# Patient Record
Sex: MALE | Race: WHITE | NOT HISPANIC OR LATINO
[De-identification: names, ages, dates, MRNs, and addresses within clinical notes are randomized per-mention and may not be internally consistent; named-entity substitution may affect disease eponyms.]

---

## 2017-01-11 ENCOUNTER — APPOINTMENT (OUTPATIENT)
Dept: GASTROENTEROLOGY | Facility: CLINIC | Age: 62
End: 2017-01-11

## 2017-01-11 VITALS
SYSTOLIC BLOOD PRESSURE: 120 MMHG | DIASTOLIC BLOOD PRESSURE: 70 MMHG | WEIGHT: 172 LBS | OXYGEN SATURATION: 98 % | TEMPERATURE: 98.6 F | HEART RATE: 68 BPM | BODY MASS INDEX: 23.3 KG/M2 | HEIGHT: 72 IN

## 2017-01-11 DIAGNOSIS — Z82.0 FAMILY HISTORY OF EPILEPSY AND OTHER DISEASES OF THE NERVOUS SYSTEM: ICD-10-CM

## 2017-01-11 DIAGNOSIS — F15.90 OTHER STIMULANT USE, UNSPECIFIED, UNCOMPLICATED: ICD-10-CM

## 2017-01-11 DIAGNOSIS — Z78.9 OTHER SPECIFIED HEALTH STATUS: ICD-10-CM

## 2017-02-02 ENCOUNTER — APPOINTMENT (OUTPATIENT)
Dept: GASTROENTEROLOGY | Facility: AMBULATORY MEDICAL SERVICES | Age: 62
End: 2017-02-02

## 2017-02-22 ENCOUNTER — APPOINTMENT (OUTPATIENT)
Dept: GASTROENTEROLOGY | Facility: CLINIC | Age: 62
End: 2017-02-22

## 2017-02-22 VITALS
RESPIRATION RATE: 16 BRPM | WEIGHT: 172 LBS | BODY MASS INDEX: 23.3 KG/M2 | HEIGHT: 72 IN | DIASTOLIC BLOOD PRESSURE: 70 MMHG | SYSTOLIC BLOOD PRESSURE: 120 MMHG | HEART RATE: 70 BPM

## 2017-03-19 ENCOUNTER — RESULT REVIEW (OUTPATIENT)
Age: 62
End: 2017-03-19

## 2017-03-20 ENCOUNTER — APPOINTMENT (OUTPATIENT)
Dept: GASTROENTEROLOGY | Facility: HOSPITAL | Age: 62
End: 2017-03-20

## 2017-03-20 ENCOUNTER — OUTPATIENT (OUTPATIENT)
Dept: OUTPATIENT SERVICES | Facility: HOSPITAL | Age: 62
LOS: 1 days | Discharge: HOME | End: 2017-03-20
Payer: COMMERCIAL

## 2017-03-20 DIAGNOSIS — K63.5 POLYP OF COLON: ICD-10-CM

## 2017-03-20 PROCEDURE — 88305 TISSUE EXAM BY PATHOLOGIST: CPT | Mod: 26

## 2017-04-11 ENCOUNTER — APPOINTMENT (OUTPATIENT)
Dept: GASTROENTEROLOGY | Facility: CLINIC | Age: 62
End: 2017-04-11

## 2017-04-18 ENCOUNTER — APPOINTMENT (OUTPATIENT)
Dept: GASTROENTEROLOGY | Facility: CLINIC | Age: 62
End: 2017-04-18

## 2017-05-17 ENCOUNTER — APPOINTMENT (OUTPATIENT)
Dept: GASTROENTEROLOGY | Facility: CLINIC | Age: 62
End: 2017-05-17

## 2017-05-17 VITALS
DIASTOLIC BLOOD PRESSURE: 80 MMHG | BODY MASS INDEX: 23.3 KG/M2 | OXYGEN SATURATION: 99 % | TEMPERATURE: 98.6 F | HEART RATE: 70 BPM | HEIGHT: 72 IN | SYSTOLIC BLOOD PRESSURE: 120 MMHG | WEIGHT: 172 LBS

## 2017-06-15 ENCOUNTER — APPOINTMENT (OUTPATIENT)
Dept: CT IMAGING | Facility: IMAGING CENTER | Age: 62
End: 2017-06-15

## 2017-06-15 ENCOUNTER — OUTPATIENT (OUTPATIENT)
Dept: OUTPATIENT SERVICES | Facility: HOSPITAL | Age: 62
LOS: 1 days | End: 2017-06-15
Payer: COMMERCIAL

## 2017-06-15 DIAGNOSIS — Z00.8 ENCOUNTER FOR OTHER GENERAL EXAMINATION: ICD-10-CM

## 2017-06-15 PROCEDURE — 71250 CT THORAX DX C-: CPT

## 2017-09-15 ENCOUNTER — LABORATORY RESULT (OUTPATIENT)
Age: 62
End: 2017-09-15

## 2017-10-09 ENCOUNTER — APPOINTMENT (OUTPATIENT)
Dept: GASTROENTEROLOGY | Facility: CLINIC | Age: 62
End: 2017-10-09
Payer: COMMERCIAL

## 2017-10-09 VITALS
OXYGEN SATURATION: 99 % | DIASTOLIC BLOOD PRESSURE: 80 MMHG | SYSTOLIC BLOOD PRESSURE: 118 MMHG | BODY MASS INDEX: 23.3 KG/M2 | HEIGHT: 72 IN | TEMPERATURE: 98.2 F | WEIGHT: 172 LBS | HEART RATE: 74 BPM

## 2017-10-09 PROCEDURE — 99213 OFFICE O/P EST LOW 20 MIN: CPT

## 2017-10-09 RX ORDER — POLYETHYLENE GLYCOL 3350, SODIUM SULFATE, SODIUM CHLORIDE, POTASSIUM CHLORIDE, ASCORBIC ACID, SODIUM ASCORBATE 7.5-2.691G
100 KIT ORAL
Qty: 1 | Refills: 0 | Status: DISCONTINUED | COMMUNITY
Start: 2017-03-06 | End: 2017-10-09

## 2017-10-09 RX ORDER — POLYETHYLENE GLYCOL 3350 AND ELECTROLYTES WITH LEMON FLAVOR 236; 22.74; 6.74; 5.86; 2.97 G/4L; G/4L; G/4L; G/4L; G/4L
236 POWDER, FOR SOLUTION ORAL
Qty: 1 | Refills: 0 | Status: DISCONTINUED | COMMUNITY
Start: 2017-03-06 | End: 2017-10-09

## 2017-10-09 RX ORDER — SODIUM SULFATE, POTASSIUM SULFATE, MAGNESIUM SULFATE 17.5; 3.13; 1.6 G/ML; G/ML; G/ML
17.5-3.13-1.6 SOLUTION, CONCENTRATE ORAL
Qty: 1 | Refills: 0 | Status: DISCONTINUED | COMMUNITY
Start: 2017-01-11 | End: 2017-10-09

## 2018-01-16 ENCOUNTER — APPOINTMENT (OUTPATIENT)
Dept: CT IMAGING | Facility: IMAGING CENTER | Age: 63
End: 2018-01-16
Payer: COMMERCIAL

## 2018-01-16 ENCOUNTER — OUTPATIENT (OUTPATIENT)
Dept: OUTPATIENT SERVICES | Facility: HOSPITAL | Age: 63
LOS: 1 days | End: 2018-01-16
Payer: COMMERCIAL

## 2018-01-16 DIAGNOSIS — Z00.8 ENCOUNTER FOR OTHER GENERAL EXAMINATION: ICD-10-CM

## 2018-01-16 PROCEDURE — 82565 ASSAY OF CREATININE: CPT

## 2018-01-16 PROCEDURE — 70491 CT SOFT TISSUE NECK W/DYE: CPT | Mod: 26

## 2018-01-16 PROCEDURE — 70491 CT SOFT TISSUE NECK W/DYE: CPT

## 2018-01-31 ENCOUNTER — OUTPATIENT (OUTPATIENT)
Dept: OUTPATIENT SERVICES | Facility: HOSPITAL | Age: 63
LOS: 1 days | End: 2018-01-31
Payer: COMMERCIAL

## 2018-01-31 VITALS
TEMPERATURE: 98 F | OXYGEN SATURATION: 97 % | SYSTOLIC BLOOD PRESSURE: 132 MMHG | DIASTOLIC BLOOD PRESSURE: 86 MMHG | RESPIRATION RATE: 16 BRPM | WEIGHT: 166.01 LBS | HEART RATE: 72 BPM | HEIGHT: 72 IN

## 2018-01-31 DIAGNOSIS — Z98.890 OTHER SPECIFIED POSTPROCEDURAL STATES: Chronic | ICD-10-CM

## 2018-01-31 DIAGNOSIS — J38.1 POLYP OF VOCAL CORD AND LARYNX: ICD-10-CM

## 2018-01-31 LAB
BUN SERPL-MCNC: 21 MG/DL — SIGNIFICANT CHANGE UP (ref 7–23)
CALCIUM SERPL-MCNC: 8.9 MG/DL — SIGNIFICANT CHANGE UP (ref 8.4–10.5)
CHLORIDE SERPL-SCNC: 100 MMOL/L — SIGNIFICANT CHANGE UP (ref 98–107)
CO2 SERPL-SCNC: 26 MMOL/L — SIGNIFICANT CHANGE UP (ref 22–31)
CREAT SERPL-MCNC: 0.88 MG/DL — SIGNIFICANT CHANGE UP (ref 0.5–1.3)
GLUCOSE SERPL-MCNC: 80 MG/DL — SIGNIFICANT CHANGE UP (ref 70–99)
HCT VFR BLD CALC: 42.6 % — SIGNIFICANT CHANGE UP (ref 39–50)
HGB BLD-MCNC: 15 G/DL — SIGNIFICANT CHANGE UP (ref 13–17)
MCHC RBC-ENTMCNC: 33.7 PG — SIGNIFICANT CHANGE UP (ref 27–34)
MCHC RBC-ENTMCNC: 35.2 % — SIGNIFICANT CHANGE UP (ref 32–36)
MCV RBC AUTO: 95.7 FL — SIGNIFICANT CHANGE UP (ref 80–100)
NRBC # FLD: 0 — SIGNIFICANT CHANGE UP
PLATELET # BLD AUTO: 277 K/UL — SIGNIFICANT CHANGE UP (ref 150–400)
PMV BLD: 9.4 FL — SIGNIFICANT CHANGE UP (ref 7–13)
POTASSIUM SERPL-MCNC: 3.6 MMOL/L — SIGNIFICANT CHANGE UP (ref 3.5–5.3)
POTASSIUM SERPL-SCNC: 3.6 MMOL/L — SIGNIFICANT CHANGE UP (ref 3.5–5.3)
RBC # BLD: 4.45 M/UL — SIGNIFICANT CHANGE UP (ref 4.2–5.8)
RBC # FLD: 12 % — SIGNIFICANT CHANGE UP (ref 10.3–14.5)
SODIUM SERPL-SCNC: 139 MMOL/L — SIGNIFICANT CHANGE UP (ref 135–145)
WBC # BLD: 6.64 K/UL — SIGNIFICANT CHANGE UP (ref 3.8–10.5)
WBC # FLD AUTO: 6.64 K/UL — SIGNIFICANT CHANGE UP (ref 3.8–10.5)

## 2018-01-31 PROCEDURE — 93010 ELECTROCARDIOGRAM REPORT: CPT

## 2018-01-31 RX ORDER — SODIUM CHLORIDE 9 MG/ML
1000 INJECTION, SOLUTION INTRAVENOUS
Qty: 0 | Refills: 0 | Status: DISCONTINUED | OUTPATIENT
Start: 2018-02-02 | End: 2018-02-03

## 2018-01-31 NOTE — H&P PST ADULT - HISTORY OF PRESENT ILLNESS
63 y/o male with history of laryngeal polyp presents to [facility]ST today for presurgical evaluation.  Flexible laryngoscopy was done.  CT Scan was done. He continues to complain of hoarseness and difficulty swallowing. 61 y/o male with history of laryngeal polyp presents to PAST today for presurgical evaluation.  Flexible laryngoscopy was done.  CT Scan was done. He continues to complain of hoarseness and states that feels it more when he swallows.  He is scheduled for direct laryngoscopy with biopsy on 2/2/18.

## 2018-01-31 NOTE — H&P PST ADULT - PROBLEM SELECTOR PLAN 1
Pt. is scheduled for direct laryngoscopy with biopsy on 2/2/18.  Preoperative instructions reviewed, pt verbalized understanding.  Preop Famotidine provided.  Lab results pending, EKG done

## 2018-01-31 NOTE — H&P PST ADULT - NSANTHOSAYNRD_GEN_A_CORE
No. DILAN screening performed.  STOP BANG Legend: 0-2 = LOW Risk; 3-4 = INTERMEDIATE Risk; 5-8 = HIGH Risk

## 2018-02-02 ENCOUNTER — TRANSCRIPTION ENCOUNTER (OUTPATIENT)
Age: 63
End: 2018-02-02

## 2018-02-02 ENCOUNTER — OUTPATIENT (OUTPATIENT)
Dept: OUTPATIENT SERVICES | Facility: HOSPITAL | Age: 63
LOS: 1 days | Discharge: HOME | End: 2018-02-02
Payer: COMMERCIAL

## 2018-02-02 ENCOUNTER — RESULT REVIEW (OUTPATIENT)
Age: 63
End: 2018-02-02

## 2018-02-02 VITALS
HEART RATE: 74 BPM | RESPIRATION RATE: 15 BRPM | SYSTOLIC BLOOD PRESSURE: 111 MMHG | TEMPERATURE: 98 F | DIASTOLIC BLOOD PRESSURE: 69 MMHG | OXYGEN SATURATION: 100 %

## 2018-02-02 VITALS
WEIGHT: 166.01 LBS | DIASTOLIC BLOOD PRESSURE: 78 MMHG | OXYGEN SATURATION: 96 % | RESPIRATION RATE: 14 BRPM | TEMPERATURE: 98 F | HEIGHT: 72 IN | SYSTOLIC BLOOD PRESSURE: 123 MMHG | HEART RATE: 83 BPM

## 2018-02-02 DIAGNOSIS — J38.1 POLYP OF VOCAL CORD AND LARYNX: ICD-10-CM

## 2018-02-02 DIAGNOSIS — Z98.890 OTHER SPECIFIED POSTPROCEDURAL STATES: Chronic | ICD-10-CM

## 2018-02-02 PROCEDURE — 88305 TISSUE EXAM BY PATHOLOGIST: CPT | Mod: 26

## 2018-02-02 RX ORDER — SODIUM CHLORIDE 9 MG/ML
1000 INJECTION, SOLUTION INTRAVENOUS
Qty: 0 | Refills: 0 | Status: DISCONTINUED | OUTPATIENT
Start: 2018-02-02 | End: 2018-02-03

## 2018-02-02 RX ORDER — IBUPROFEN 200 MG
2 TABLET ORAL
Qty: 0 | Refills: 0 | COMMUNITY

## 2018-02-02 RX ADMIN — SODIUM CHLORIDE 30 MILLILITER(S): 9 INJECTION, SOLUTION INTRAVENOUS at 09:06

## 2018-02-02 NOTE — ASU DISCHARGE PLAN (ADULT/PEDIATRIC). - NOTIFY
Fever greater than 101/Increased Irritability or Sluggishness/Swelling that continues/Persistent Nausea and Vomiting/Bleeding that does not stop/Numbness, tingling/Excessive Diarrhea/Unable to Urinate/Inability to Tolerate Liquids or Foods/Pain not relieved by Medications

## 2018-02-02 NOTE — ASU DISCHARGE PLAN (ADULT/PEDIATRIC). - NURSING INSTRUCTIONS
You were given IV Tylenol for pain management.  Please DO NOT take tylenol or products that contain tylenol for the next 6-8 hours (until 4:00pm ). Please do not exceed 3000mg in 24hours.

## 2018-02-02 NOTE — ASU DISCHARGE PLAN (ADULT/PEDIATRIC). - MEDICATION SUMMARY - MEDICATIONS TO STOP TAKING
I will STOP taking the medications listed below when I get home from the hospital:    Advil 200 mg oral tablet  -- 2 tab(s) by mouth , As Needed. Last dose 1/29/18

## 2018-02-02 NOTE — BRIEF OPERATIVE NOTE - OPERATION/FINDINGS
Erosive process of right medial arytenoid, false vocal fold, right vocalis muscle, and right true vocal fold. Minor polypoid/inflammatory changes of left false vocal fold

## 2018-02-02 NOTE — BRIEF OPERATIVE NOTE - SPECIMENS
Laryngeal foreign body, right false vocal fold, right arytenoid, right vocalis, right posterio vocal fold, right anterior vocalis, left false vocal fold

## 2018-02-02 NOTE — BRIEF OPERATIVE NOTE - PROCEDURE
<<-----Click on this checkbox to enter Procedure Laryngoscopy with biopsy of both sides of larynx  02/02/2018    Active  SALTY

## 2018-02-06 LAB — SURGICAL PATHOLOGY STUDY: SIGNIFICANT CHANGE UP

## 2018-02-12 ENCOUNTER — OUTPATIENT (OUTPATIENT)
Dept: OUTPATIENT SERVICES | Facility: HOSPITAL | Age: 63
LOS: 1 days | Discharge: HOME | End: 2018-02-12

## 2018-02-12 DIAGNOSIS — Z98.890 OTHER SPECIFIED POSTPROCEDURAL STATES: Chronic | ICD-10-CM

## 2018-02-16 ENCOUNTER — APPOINTMENT (OUTPATIENT)
Dept: OTOLARYNGOLOGY | Facility: CLINIC | Age: 63
End: 2018-02-16
Payer: COMMERCIAL

## 2018-02-16 VITALS
DIASTOLIC BLOOD PRESSURE: 70 MMHG | BODY MASS INDEX: 23.03 KG/M2 | OXYGEN SATURATION: 98 % | HEART RATE: 87 BPM | HEIGHT: 72 IN | WEIGHT: 170 LBS | SYSTOLIC BLOOD PRESSURE: 110 MMHG | RESPIRATION RATE: 16 BRPM

## 2018-02-16 PROCEDURE — 31575 DIAGNOSTIC LARYNGOSCOPY: CPT

## 2018-02-16 PROCEDURE — 99205 OFFICE O/P NEW HI 60 MIN: CPT | Mod: 25

## 2018-02-21 ENCOUNTER — OUTPATIENT (OUTPATIENT)
Dept: OUTPATIENT SERVICES | Facility: HOSPITAL | Age: 63
LOS: 1 days | Discharge: HOME | End: 2018-02-21
Payer: COMMERCIAL

## 2018-02-21 DIAGNOSIS — Z98.890 OTHER SPECIFIED POSTPROCEDURAL STATES: Chronic | ICD-10-CM

## 2018-02-21 DIAGNOSIS — C44.92 SQUAMOUS CELL CARCINOMA OF SKIN, UNSPECIFIED: ICD-10-CM

## 2018-02-22 ENCOUNTER — FORM ENCOUNTER (OUTPATIENT)
Age: 63
End: 2018-02-22

## 2018-02-22 ENCOUNTER — APPOINTMENT (OUTPATIENT)
Dept: RADIATION ONCOLOGY | Facility: CLINIC | Age: 63
End: 2018-02-22
Payer: COMMERCIAL

## 2018-02-22 VITALS
HEART RATE: 73 BPM | TEMPERATURE: 97.8 F | OXYGEN SATURATION: 99 % | SYSTOLIC BLOOD PRESSURE: 137 MMHG | WEIGHT: 166.12 LBS | HEIGHT: 72 IN | DIASTOLIC BLOOD PRESSURE: 85 MMHG | BODY MASS INDEX: 22.5 KG/M2 | RESPIRATION RATE: 16 BRPM

## 2018-02-22 DIAGNOSIS — Z87.891 PERSONAL HISTORY OF NICOTINE DEPENDENCE: ICD-10-CM

## 2018-02-22 PROCEDURE — 31575 DIAGNOSTIC LARYNGOSCOPY: CPT

## 2018-02-22 PROCEDURE — 99243 OFF/OP CNSLTJ NEW/EST LOW 30: CPT | Mod: 25,GC

## 2018-02-22 PROCEDURE — 77263 THER RADIOLOGY TX PLNG CPLX: CPT

## 2018-02-23 ENCOUNTER — APPOINTMENT (OUTPATIENT)
Dept: HEMATOLOGY ONCOLOGY | Facility: CLINIC | Age: 63
End: 2018-02-23
Payer: COMMERCIAL

## 2018-02-23 ENCOUNTER — APPOINTMENT (OUTPATIENT)
Dept: NUCLEAR MEDICINE | Facility: IMAGING CENTER | Age: 63
End: 2018-02-23
Payer: COMMERCIAL

## 2018-02-23 ENCOUNTER — OUTPATIENT (OUTPATIENT)
Dept: OUTPATIENT SERVICES | Facility: HOSPITAL | Age: 63
LOS: 1 days | End: 2018-02-23
Payer: COMMERCIAL

## 2018-02-23 VITALS
TEMPERATURE: 97.9 F | HEART RATE: 69 BPM | BODY MASS INDEX: 21.91 KG/M2 | OXYGEN SATURATION: 99 % | DIASTOLIC BLOOD PRESSURE: 79 MMHG | HEIGHT: 73.03 IN | RESPIRATION RATE: 16 BRPM | SYSTOLIC BLOOD PRESSURE: 149 MMHG | WEIGHT: 165.34 LBS

## 2018-02-23 DIAGNOSIS — Z98.890 OTHER SPECIFIED POSTPROCEDURAL STATES: Chronic | ICD-10-CM

## 2018-02-23 DIAGNOSIS — C32.9 MALIGNANT NEOPLASM OF LARYNX, UNSPECIFIED: ICD-10-CM

## 2018-02-23 PROCEDURE — A9552: CPT

## 2018-02-23 PROCEDURE — 99205 OFFICE O/P NEW HI 60 MIN: CPT

## 2018-02-23 PROCEDURE — 78815 PET IMAGE W/CT SKULL-THIGH: CPT

## 2018-02-23 PROCEDURE — 78815 PET IMAGE W/CT SKULL-THIGH: CPT | Mod: 26,PI

## 2018-02-27 ENCOUNTER — APPOINTMENT (OUTPATIENT)
Dept: OTOLARYNGOLOGY | Facility: CLINIC | Age: 63
End: 2018-02-27
Payer: COMMERCIAL

## 2018-02-27 VITALS — SYSTOLIC BLOOD PRESSURE: 103 MMHG | HEART RATE: 72 BPM | DIASTOLIC BLOOD PRESSURE: 78 MMHG

## 2018-02-27 PROCEDURE — 31575 DIAGNOSTIC LARYNGOSCOPY: CPT

## 2018-02-27 PROCEDURE — 99214 OFFICE O/P EST MOD 30 MIN: CPT | Mod: 25

## 2018-03-02 PROCEDURE — 77470 SPECIAL RADIATION TREATMENT: CPT | Mod: 26

## 2018-03-06 PROCEDURE — 77301 RADIOTHERAPY DOSE PLAN IMRT: CPT | Mod: 26

## 2018-03-06 PROCEDURE — 77338 DESIGN MLC DEVICE FOR IMRT: CPT | Mod: 26

## 2018-03-06 PROCEDURE — 77300 RADIATION THERAPY DOSE PLAN: CPT | Mod: 26

## 2018-03-12 PROCEDURE — 77387B: CUSTOM | Mod: 26

## 2018-03-13 ENCOUNTER — LABORATORY RESULT (OUTPATIENT)
Age: 63
End: 2018-03-13

## 2018-03-13 ENCOUNTER — RESULT REVIEW (OUTPATIENT)
Age: 63
End: 2018-03-13

## 2018-03-13 ENCOUNTER — APPOINTMENT (OUTPATIENT)
Dept: INFUSION THERAPY | Facility: HOSPITAL | Age: 63
End: 2018-03-13

## 2018-03-13 LAB
ANION GAP SERPL CALC-SCNC: 11 MMOL/L — SIGNIFICANT CHANGE UP (ref 5–17)
BASOPHILS # BLD AUTO: 0.1 K/UL — SIGNIFICANT CHANGE UP (ref 0–0.2)
BASOPHILS NFR BLD AUTO: 0.9 % — SIGNIFICANT CHANGE UP (ref 0–2)
BUN SERPL-MCNC: 17 MG/DL — SIGNIFICANT CHANGE UP (ref 7–23)
CALCIUM SERPL-MCNC: 9.5 MG/DL — SIGNIFICANT CHANGE UP (ref 8.4–10.5)
CHLORIDE SERPL-SCNC: 102 MMOL/L — SIGNIFICANT CHANGE UP (ref 96–108)
CO2 SERPL-SCNC: 25 MMOL/L — SIGNIFICANT CHANGE UP (ref 22–31)
CREAT SERPL-MCNC: 0.86 MG/DL — SIGNIFICANT CHANGE UP (ref 0.5–1.3)
EOSINOPHIL # BLD AUTO: 0.4 K/UL — SIGNIFICANT CHANGE UP (ref 0–0.5)
EOSINOPHIL NFR BLD AUTO: 5.1 % — SIGNIFICANT CHANGE UP (ref 0–6)
GLUCOSE SERPL-MCNC: 85 MG/DL — SIGNIFICANT CHANGE UP (ref 70–99)
HCT VFR BLD CALC: 46.1 % — SIGNIFICANT CHANGE UP (ref 39–50)
HGB BLD-MCNC: 16.3 G/DL — SIGNIFICANT CHANGE UP (ref 13–17)
LYMPHOCYTES # BLD AUTO: 1.4 K/UL — SIGNIFICANT CHANGE UP (ref 1–3.3)
LYMPHOCYTES # BLD AUTO: 17.6 % — SIGNIFICANT CHANGE UP (ref 13–44)
MCHC RBC-ENTMCNC: 33.8 PG — SIGNIFICANT CHANGE UP (ref 27–34)
MCHC RBC-ENTMCNC: 35.3 G/DL — SIGNIFICANT CHANGE UP (ref 32–36)
MCV RBC AUTO: 95.8 FL — SIGNIFICANT CHANGE UP (ref 80–100)
MONOCYTES # BLD AUTO: 0.7 K/UL — SIGNIFICANT CHANGE UP (ref 0–0.9)
MONOCYTES NFR BLD AUTO: 9.6 % — SIGNIFICANT CHANGE UP (ref 2–14)
NEUTROPHILS # BLD AUTO: 5.2 K/UL — SIGNIFICANT CHANGE UP (ref 1.8–7.4)
NEUTROPHILS NFR BLD AUTO: 66.8 % — SIGNIFICANT CHANGE UP (ref 43–77)
PLATELET # BLD AUTO: 248 K/UL — SIGNIFICANT CHANGE UP (ref 150–400)
POTASSIUM SERPL-MCNC: 5 MMOL/L — SIGNIFICANT CHANGE UP (ref 3.5–5.3)
POTASSIUM SERPL-SCNC: 5 MMOL/L — SIGNIFICANT CHANGE UP (ref 3.5–5.3)
RBC # BLD: 4.82 M/UL — SIGNIFICANT CHANGE UP (ref 4.2–5.8)
RBC # FLD: 11.1 % — SIGNIFICANT CHANGE UP (ref 10.3–14.5)
SODIUM SERPL-SCNC: 138 MMOL/L — SIGNIFICANT CHANGE UP (ref 135–145)
WBC # BLD: 7.8 K/UL — SIGNIFICANT CHANGE UP (ref 3.8–10.5)
WBC # FLD AUTO: 7.8 K/UL — SIGNIFICANT CHANGE UP (ref 3.8–10.5)

## 2018-03-13 PROCEDURE — 93010 ELECTROCARDIOGRAM REPORT: CPT

## 2018-03-13 PROCEDURE — 77387B: CUSTOM | Mod: 26

## 2018-03-14 ENCOUNTER — APPOINTMENT (OUTPATIENT)
Dept: INFUSION THERAPY | Facility: HOSPITAL | Age: 63
End: 2018-03-14

## 2018-03-14 DIAGNOSIS — Z51.11 ENCOUNTER FOR ANTINEOPLASTIC CHEMOTHERAPY: ICD-10-CM

## 2018-03-14 DIAGNOSIS — R11.2 NAUSEA WITH VOMITING, UNSPECIFIED: ICD-10-CM

## 2018-03-14 DIAGNOSIS — E86.0 DEHYDRATION: ICD-10-CM

## 2018-03-14 PROBLEM — C32.9 MALIGNANT NEOPLASM OF LARYNX, UNSPECIFIED: Chronic | Status: ACTIVE | Noted: 2018-03-12

## 2018-03-14 PROBLEM — D12.6 BENIGN NEOPLASM OF COLON, UNSPECIFIED: Chronic | Status: ACTIVE | Noted: 2018-03-12

## 2018-03-14 PROCEDURE — 77387B: CUSTOM | Mod: 26

## 2018-03-15 PROCEDURE — 77387B: CUSTOM | Mod: 26

## 2018-03-16 ENCOUNTER — APPOINTMENT (OUTPATIENT)
Dept: INFUSION THERAPY | Facility: HOSPITAL | Age: 63
End: 2018-03-16

## 2018-03-16 PROCEDURE — 77427 RADIATION TX MANAGEMENT X5: CPT

## 2018-03-16 PROCEDURE — 77387B: CUSTOM | Mod: 26

## 2018-03-17 ENCOUNTER — APPOINTMENT (OUTPATIENT)
Dept: INFUSION THERAPY | Facility: HOSPITAL | Age: 63
End: 2018-03-17

## 2018-03-19 ENCOUNTER — RESULT REVIEW (OUTPATIENT)
Age: 63
End: 2018-03-19

## 2018-03-19 ENCOUNTER — APPOINTMENT (OUTPATIENT)
Dept: INFUSION THERAPY | Facility: HOSPITAL | Age: 63
End: 2018-03-19

## 2018-03-19 ENCOUNTER — LABORATORY RESULT (OUTPATIENT)
Age: 63
End: 2018-03-19

## 2018-03-19 LAB
BASOPHILS # BLD AUTO: 0.1 K/UL — SIGNIFICANT CHANGE UP (ref 0–0.2)
BASOPHILS NFR BLD AUTO: 1.1 % — SIGNIFICANT CHANGE UP (ref 0–2)
EOSINOPHIL # BLD AUTO: 0.2 K/UL — SIGNIFICANT CHANGE UP (ref 0–0.5)
EOSINOPHIL NFR BLD AUTO: 3.4 % — SIGNIFICANT CHANGE UP (ref 0–6)
HCT VFR BLD CALC: 40.7 % — SIGNIFICANT CHANGE UP (ref 39–50)
HGB BLD-MCNC: 14.6 G/DL — SIGNIFICANT CHANGE UP (ref 13–17)
LYMPHOCYTES # BLD AUTO: 1.5 K/UL — SIGNIFICANT CHANGE UP (ref 1–3.3)
LYMPHOCYTES # BLD AUTO: 24.2 % — SIGNIFICANT CHANGE UP (ref 13–44)
MCHC RBC-ENTMCNC: 34.3 PG — HIGH (ref 27–34)
MCHC RBC-ENTMCNC: 36 G/DL — SIGNIFICANT CHANGE UP (ref 32–36)
MCV RBC AUTO: 95.5 FL — SIGNIFICANT CHANGE UP (ref 80–100)
MONOCYTES # BLD AUTO: 0.6 K/UL — SIGNIFICANT CHANGE UP (ref 0–0.9)
MONOCYTES NFR BLD AUTO: 9.9 % — SIGNIFICANT CHANGE UP (ref 2–14)
NEUTROPHILS # BLD AUTO: 3.9 K/UL — SIGNIFICANT CHANGE UP (ref 1.8–7.4)
NEUTROPHILS NFR BLD AUTO: 61.4 % — SIGNIFICANT CHANGE UP (ref 43–77)
PLATELET # BLD AUTO: 216 K/UL — SIGNIFICANT CHANGE UP (ref 150–400)
RBC # BLD: 4.26 M/UL — SIGNIFICANT CHANGE UP (ref 4.2–5.8)
RBC # FLD: 10.9 % — SIGNIFICANT CHANGE UP (ref 10.3–14.5)
WBC # BLD: 6.3 K/UL — SIGNIFICANT CHANGE UP (ref 3.8–10.5)
WBC # FLD AUTO: 6.3 K/UL — SIGNIFICANT CHANGE UP (ref 3.8–10.5)

## 2018-03-19 PROCEDURE — 77387B: CUSTOM | Mod: 26

## 2018-03-20 VITALS — RESPIRATION RATE: 16 BRPM | WEIGHT: 169 LBS | BODY MASS INDEX: 22.4 KG/M2 | HEIGHT: 73 IN

## 2018-03-20 PROCEDURE — 77387B: CUSTOM | Mod: 26

## 2018-03-21 PROCEDURE — 77387B: CUSTOM | Mod: 26

## 2018-03-22 PROCEDURE — 77387B: CUSTOM | Mod: 26

## 2018-03-23 ENCOUNTER — RESULT REVIEW (OUTPATIENT)
Age: 63
End: 2018-03-23

## 2018-03-23 ENCOUNTER — LABORATORY RESULT (OUTPATIENT)
Age: 63
End: 2018-03-23

## 2018-03-23 ENCOUNTER — APPOINTMENT (OUTPATIENT)
Dept: HEMATOLOGY ONCOLOGY | Facility: CLINIC | Age: 63
End: 2018-03-23
Payer: COMMERCIAL

## 2018-03-23 ENCOUNTER — APPOINTMENT (OUTPATIENT)
Dept: INFUSION THERAPY | Facility: HOSPITAL | Age: 63
End: 2018-03-23

## 2018-03-23 VITALS
WEIGHT: 168.65 LBS | BODY MASS INDEX: 22.25 KG/M2 | TEMPERATURE: 98 F | RESPIRATION RATE: 16 BRPM | DIASTOLIC BLOOD PRESSURE: 70 MMHG | OXYGEN SATURATION: 98 % | SYSTOLIC BLOOD PRESSURE: 122 MMHG | HEART RATE: 74 BPM

## 2018-03-23 LAB
BASOPHILS # BLD AUTO: 0.1 K/UL — SIGNIFICANT CHANGE UP (ref 0–0.2)
BASOPHILS NFR BLD AUTO: 1.3 % — SIGNIFICANT CHANGE UP (ref 0–2)
EOSINOPHIL # BLD AUTO: 0.2 K/UL — SIGNIFICANT CHANGE UP (ref 0–0.5)
EOSINOPHIL NFR BLD AUTO: 3.1 % — SIGNIFICANT CHANGE UP (ref 0–6)
HCT VFR BLD CALC: 39.7 % — SIGNIFICANT CHANGE UP (ref 39–50)
HGB BLD-MCNC: 14.1 G/DL — SIGNIFICANT CHANGE UP (ref 13–17)
LYMPHOCYTES # BLD AUTO: 1.1 K/UL — SIGNIFICANT CHANGE UP (ref 1–3.3)
LYMPHOCYTES # BLD AUTO: 21.1 % — SIGNIFICANT CHANGE UP (ref 13–44)
MCHC RBC-ENTMCNC: 33.8 PG — SIGNIFICANT CHANGE UP (ref 27–34)
MCHC RBC-ENTMCNC: 35.4 G/DL — SIGNIFICANT CHANGE UP (ref 32–36)
MCV RBC AUTO: 95.4 FL — SIGNIFICANT CHANGE UP (ref 80–100)
MONOCYTES # BLD AUTO: 0.6 K/UL — SIGNIFICANT CHANGE UP (ref 0–0.9)
MONOCYTES NFR BLD AUTO: 12.1 % — SIGNIFICANT CHANGE UP (ref 2–14)
NEUTROPHILS # BLD AUTO: 3.3 K/UL — SIGNIFICANT CHANGE UP (ref 1.8–7.4)
NEUTROPHILS NFR BLD AUTO: 62.4 % — SIGNIFICANT CHANGE UP (ref 43–77)
PLATELET # BLD AUTO: 192 K/UL — SIGNIFICANT CHANGE UP (ref 150–400)
RBC # BLD: 4.16 M/UL — LOW (ref 4.2–5.8)
RBC # FLD: 10.9 % — SIGNIFICANT CHANGE UP (ref 10.3–14.5)
WBC # BLD: 5.3 K/UL — SIGNIFICANT CHANGE UP (ref 3.8–10.5)
WBC # FLD AUTO: 5.3 K/UL — SIGNIFICANT CHANGE UP (ref 3.8–10.5)

## 2018-03-23 PROCEDURE — 77427 RADIATION TX MANAGEMENT X5: CPT

## 2018-03-23 PROCEDURE — 99214 OFFICE O/P EST MOD 30 MIN: CPT

## 2018-03-23 PROCEDURE — 77387B: CUSTOM | Mod: 26

## 2018-03-26 ENCOUNTER — OUTPATIENT (OUTPATIENT)
Dept: OUTPATIENT SERVICES | Facility: HOSPITAL | Age: 63
LOS: 1 days | Discharge: HOME | End: 2018-03-26

## 2018-03-26 DIAGNOSIS — Z98.890 OTHER SPECIFIED POSTPROCEDURAL STATES: Chronic | ICD-10-CM

## 2018-03-26 DIAGNOSIS — C44.92 SQUAMOUS CELL CARCINOMA OF SKIN, UNSPECIFIED: ICD-10-CM

## 2018-03-26 PROCEDURE — 77387B: CUSTOM | Mod: 26

## 2018-03-27 ENCOUNTER — APPOINTMENT (OUTPATIENT)
Dept: INFUSION THERAPY | Facility: HOSPITAL | Age: 63
End: 2018-03-27

## 2018-03-27 PROCEDURE — 77387B: CUSTOM | Mod: 26

## 2018-03-28 DIAGNOSIS — E86.0 DEHYDRATION: ICD-10-CM

## 2018-03-28 PROCEDURE — 77387B: CUSTOM | Mod: 26

## 2018-03-29 PROCEDURE — 77387B: CUSTOM | Mod: 26

## 2018-03-30 ENCOUNTER — APPOINTMENT (OUTPATIENT)
Dept: INFUSION THERAPY | Facility: HOSPITAL | Age: 63
End: 2018-03-30

## 2018-03-30 PROCEDURE — 77427 RADIATION TX MANAGEMENT X5: CPT

## 2018-03-30 PROCEDURE — 77387B: CUSTOM | Mod: 26

## 2018-04-02 VITALS — BODY MASS INDEX: 19.32 KG/M2 | RESPIRATION RATE: 16 BRPM | HEIGHT: 78 IN | WEIGHT: 167 LBS

## 2018-04-02 PROCEDURE — 77387B: CUSTOM | Mod: 26

## 2018-04-03 ENCOUNTER — RESULT REVIEW (OUTPATIENT)
Age: 63
End: 2018-04-03

## 2018-04-03 ENCOUNTER — APPOINTMENT (OUTPATIENT)
Dept: INFUSION THERAPY | Facility: HOSPITAL | Age: 63
End: 2018-04-03

## 2018-04-03 ENCOUNTER — LABORATORY RESULT (OUTPATIENT)
Age: 63
End: 2018-04-03

## 2018-04-03 LAB
EOSINOPHIL # BLD AUTO: 0.2 K/UL — SIGNIFICANT CHANGE UP (ref 0–0.5)
EOSINOPHIL NFR BLD AUTO: 1 % — SIGNIFICANT CHANGE UP (ref 0–6)
HCT VFR BLD CALC: 42.8 % — SIGNIFICANT CHANGE UP (ref 39–50)
HGB BLD-MCNC: 15 G/DL — SIGNIFICANT CHANGE UP (ref 13–17)
LYMPHOCYTES # BLD AUTO: 0.7 K/UL — LOW (ref 1–3.3)
LYMPHOCYTES # BLD AUTO: 21 % — SIGNIFICANT CHANGE UP (ref 13–44)
MCHC RBC-ENTMCNC: 32.9 PG — SIGNIFICANT CHANGE UP (ref 27–34)
MCHC RBC-ENTMCNC: 35.1 G/DL — SIGNIFICANT CHANGE UP (ref 32–36)
MCV RBC AUTO: 93.5 FL — SIGNIFICANT CHANGE UP (ref 80–100)
MONOCYTES # BLD AUTO: 0.7 K/UL — SIGNIFICANT CHANGE UP (ref 0–0.9)
MONOCYTES NFR BLD AUTO: 16 % — HIGH (ref 2–14)
NEUTROPHILS # BLD AUTO: 1.9 K/UL — SIGNIFICANT CHANGE UP (ref 1.8–7.4)
NEUTROPHILS NFR BLD AUTO: 62 % — SIGNIFICANT CHANGE UP (ref 43–77)
PLAT MORPH BLD: NORMAL — SIGNIFICANT CHANGE UP
PLATELET # BLD AUTO: 156 K/UL — SIGNIFICANT CHANGE UP (ref 150–400)
RBC # BLD: 4.58 M/UL — SIGNIFICANT CHANGE UP (ref 4.2–5.8)
RBC # FLD: 11.3 % — SIGNIFICANT CHANGE UP (ref 10.3–14.5)
RBC BLD AUTO: SIGNIFICANT CHANGE UP
WBC # BLD: 3.6 K/UL — LOW (ref 3.8–10.5)
WBC # FLD AUTO: 3.6 K/UL — LOW (ref 3.8–10.5)

## 2018-04-03 PROCEDURE — 77387B: CUSTOM | Mod: 26

## 2018-04-04 ENCOUNTER — APPOINTMENT (OUTPATIENT)
Dept: INFUSION THERAPY | Facility: HOSPITAL | Age: 63
End: 2018-04-04

## 2018-04-04 DIAGNOSIS — Z51.11 ENCOUNTER FOR ANTINEOPLASTIC CHEMOTHERAPY: ICD-10-CM

## 2018-04-04 DIAGNOSIS — R11.2 NAUSEA WITH VOMITING, UNSPECIFIED: ICD-10-CM

## 2018-04-04 PROCEDURE — 77387B: CUSTOM | Mod: 26

## 2018-04-05 PROCEDURE — 77387B: CUSTOM | Mod: 26

## 2018-04-06 ENCOUNTER — APPOINTMENT (OUTPATIENT)
Dept: INFUSION THERAPY | Facility: HOSPITAL | Age: 63
End: 2018-04-06

## 2018-04-06 PROCEDURE — 77427 RADIATION TX MANAGEMENT X5: CPT

## 2018-04-06 PROCEDURE — 77387B: CUSTOM | Mod: 26

## 2018-04-09 VITALS
WEIGHT: 160.36 LBS | HEART RATE: 91 BPM | RESPIRATION RATE: 16 BRPM | DIASTOLIC BLOOD PRESSURE: 76 MMHG | SYSTOLIC BLOOD PRESSURE: 131 MMHG | BODY MASS INDEX: 18.53 KG/M2 | OXYGEN SATURATION: 97 % | TEMPERATURE: 98 F

## 2018-04-09 PROCEDURE — 77387B: CUSTOM | Mod: 26

## 2018-04-10 ENCOUNTER — APPOINTMENT (OUTPATIENT)
Dept: HEMATOLOGY ONCOLOGY | Facility: CLINIC | Age: 63
End: 2018-04-10
Payer: COMMERCIAL

## 2018-04-10 ENCOUNTER — APPOINTMENT (OUTPATIENT)
Dept: INFUSION THERAPY | Facility: HOSPITAL | Age: 63
End: 2018-04-10

## 2018-04-10 ENCOUNTER — RESULT REVIEW (OUTPATIENT)
Age: 63
End: 2018-04-10

## 2018-04-10 ENCOUNTER — LABORATORY RESULT (OUTPATIENT)
Age: 63
End: 2018-04-10

## 2018-04-10 VITALS
BODY MASS INDEX: 18.7 KG/M2 | TEMPERATURE: 97.8 F | HEART RATE: 95 BPM | OXYGEN SATURATION: 96 % | DIASTOLIC BLOOD PRESSURE: 80 MMHG | RESPIRATION RATE: 16 BRPM | WEIGHT: 161.82 LBS | SYSTOLIC BLOOD PRESSURE: 144 MMHG

## 2018-04-10 DIAGNOSIS — H93.19 TINNITUS, UNSPECIFIED EAR: ICD-10-CM

## 2018-04-10 DIAGNOSIS — R11.0 NAUSEA: ICD-10-CM

## 2018-04-10 LAB
EOSINOPHIL # BLD AUTO: 0 K/UL — SIGNIFICANT CHANGE UP (ref 0–0.5)
EOSINOPHIL NFR BLD AUTO: 1 % — SIGNIFICANT CHANGE UP (ref 0–6)
HCT VFR BLD CALC: 39.2 % — SIGNIFICANT CHANGE UP (ref 39–50)
HGB BLD-MCNC: 14 G/DL — SIGNIFICANT CHANGE UP (ref 13–17)
LYMPHOCYTES # BLD AUTO: 0.7 K/UL — LOW (ref 1–3.3)
LYMPHOCYTES # BLD AUTO: 25 % — SIGNIFICANT CHANGE UP (ref 13–44)
MCHC RBC-ENTMCNC: 33.6 PG — SIGNIFICANT CHANGE UP (ref 27–34)
MCHC RBC-ENTMCNC: 35.8 G/DL — SIGNIFICANT CHANGE UP (ref 32–36)
MCV RBC AUTO: 93.9 FL — SIGNIFICANT CHANGE UP (ref 80–100)
MONOCYTES # BLD AUTO: 0.7 K/UL — SIGNIFICANT CHANGE UP (ref 0–0.9)
MONOCYTES NFR BLD AUTO: 14 % — SIGNIFICANT CHANGE UP (ref 2–14)
NEUTROPHILS # BLD AUTO: 2.6 K/UL — SIGNIFICANT CHANGE UP (ref 1.8–7.4)
NEUTROPHILS NFR BLD AUTO: 60 % — SIGNIFICANT CHANGE UP (ref 43–77)
PLAT MORPH BLD: NORMAL — SIGNIFICANT CHANGE UP
PLATELET # BLD AUTO: 154 K/UL — SIGNIFICANT CHANGE UP (ref 150–400)
RBC # BLD: 4.18 M/UL — LOW (ref 4.2–5.8)
RBC # FLD: 11 % — SIGNIFICANT CHANGE UP (ref 10.3–14.5)
RBC BLD AUTO: SIGNIFICANT CHANGE UP
WBC # BLD: 4 K/UL — SIGNIFICANT CHANGE UP (ref 3.8–10.5)
WBC # FLD AUTO: 4 K/UL — SIGNIFICANT CHANGE UP (ref 3.8–10.5)

## 2018-04-10 PROCEDURE — 77387B: CUSTOM | Mod: 26

## 2018-04-10 PROCEDURE — 99215 OFFICE O/P EST HI 40 MIN: CPT

## 2018-04-11 PROCEDURE — 77387B: CUSTOM | Mod: 26

## 2018-04-12 PROCEDURE — 77387B: CUSTOM | Mod: 26

## 2018-04-13 ENCOUNTER — LABORATORY RESULT (OUTPATIENT)
Age: 63
End: 2018-04-13

## 2018-04-13 ENCOUNTER — RESULT REVIEW (OUTPATIENT)
Age: 63
End: 2018-04-13

## 2018-04-13 ENCOUNTER — APPOINTMENT (OUTPATIENT)
Dept: INFUSION THERAPY | Facility: HOSPITAL | Age: 63
End: 2018-04-13

## 2018-04-13 LAB
BASOPHILS # BLD AUTO: 0 K/UL — SIGNIFICANT CHANGE UP (ref 0–0.2)
BASOPHILS NFR BLD AUTO: 0.2 % — SIGNIFICANT CHANGE UP (ref 0–2)
EOSINOPHIL # BLD AUTO: 0 K/UL — SIGNIFICANT CHANGE UP (ref 0–0.5)
EOSINOPHIL NFR BLD AUTO: 0.4 % — SIGNIFICANT CHANGE UP (ref 0–6)
HCT VFR BLD CALC: 36.5 % — LOW (ref 39–50)
HGB BLD-MCNC: 13.3 G/DL — SIGNIFICANT CHANGE UP (ref 13–17)
LYMPHOCYTES # BLD AUTO: 0.7 K/UL — LOW (ref 1–3.3)
LYMPHOCYTES # BLD AUTO: 9.7 % — LOW (ref 13–44)
MCHC RBC-ENTMCNC: 34.2 PG — HIGH (ref 27–34)
MCHC RBC-ENTMCNC: 36.5 G/DL — HIGH (ref 32–36)
MCV RBC AUTO: 93.7 FL — SIGNIFICANT CHANGE UP (ref 80–100)
MONOCYTES # BLD AUTO: 0.6 K/UL — SIGNIFICANT CHANGE UP (ref 0–0.9)
MONOCYTES NFR BLD AUTO: 8.3 % — SIGNIFICANT CHANGE UP (ref 2–14)
NEUTROPHILS # BLD AUTO: 6 K/UL — SIGNIFICANT CHANGE UP (ref 1.8–7.4)
NEUTROPHILS NFR BLD AUTO: 81.4 % — HIGH (ref 43–77)
PLATELET # BLD AUTO: 130 K/UL — LOW (ref 150–400)
RBC # BLD: 3.89 M/UL — LOW (ref 4.2–5.8)
RBC # FLD: 11.1 % — SIGNIFICANT CHANGE UP (ref 10.3–14.5)
WBC # BLD: 7.4 K/UL — SIGNIFICANT CHANGE UP (ref 3.8–10.5)
WBC # FLD AUTO: 7.4 K/UL — SIGNIFICANT CHANGE UP (ref 3.8–10.5)

## 2018-04-13 PROCEDURE — 77387B: CUSTOM | Mod: 26

## 2018-04-13 PROCEDURE — 77427 RADIATION TX MANAGEMENT X5: CPT

## 2018-04-16 ENCOUNTER — APPOINTMENT (OUTPATIENT)
Dept: OTOLARYNGOLOGY | Facility: CLINIC | Age: 63
End: 2018-04-16
Payer: COMMERCIAL

## 2018-04-16 VITALS
WEIGHT: 160 LBS | RESPIRATION RATE: 18 BRPM | DIASTOLIC BLOOD PRESSURE: 80 MMHG | SYSTOLIC BLOOD PRESSURE: 144 MMHG | HEART RATE: 96 BPM | BODY MASS INDEX: 18.51 KG/M2 | HEIGHT: 78 IN

## 2018-04-16 PROCEDURE — 77387B: CUSTOM | Mod: 26

## 2018-04-16 PROCEDURE — 99214 OFFICE O/P EST MOD 30 MIN: CPT | Mod: 25

## 2018-04-17 ENCOUNTER — LABORATORY RESULT (OUTPATIENT)
Age: 63
End: 2018-04-17

## 2018-04-17 ENCOUNTER — RESULT REVIEW (OUTPATIENT)
Age: 63
End: 2018-04-17

## 2018-04-17 ENCOUNTER — APPOINTMENT (OUTPATIENT)
Dept: INFUSION THERAPY | Facility: HOSPITAL | Age: 63
End: 2018-04-17

## 2018-04-17 LAB
BASOPHILS # BLD AUTO: 0 K/UL — SIGNIFICANT CHANGE UP (ref 0–0.2)
BASOPHILS NFR BLD AUTO: 0.4 % — SIGNIFICANT CHANGE UP (ref 0–2)
EOSINOPHIL # BLD AUTO: 0 K/UL — SIGNIFICANT CHANGE UP (ref 0–0.5)
EOSINOPHIL NFR BLD AUTO: 0.4 % — SIGNIFICANT CHANGE UP (ref 0–6)
HCT VFR BLD CALC: 34.1 % — LOW (ref 39–50)
HGB BLD-MCNC: 12.6 G/DL — LOW (ref 13–17)
LYMPHOCYTES # BLD AUTO: 0.5 K/UL — LOW (ref 1–3.3)
LYMPHOCYTES # BLD AUTO: 18.3 % — SIGNIFICANT CHANGE UP (ref 13–44)
MCHC RBC-ENTMCNC: 34.8 PG — HIGH (ref 27–34)
MCHC RBC-ENTMCNC: 37.1 G/DL — HIGH (ref 32–36)
MCV RBC AUTO: 93.7 FL — SIGNIFICANT CHANGE UP (ref 80–100)
MONOCYTES # BLD AUTO: 0.3 K/UL — SIGNIFICANT CHANGE UP (ref 0–0.9)
MONOCYTES NFR BLD AUTO: 13.1 % — SIGNIFICANT CHANGE UP (ref 2–14)
NEUTROPHILS # BLD AUTO: 1.7 K/UL — LOW (ref 1.8–7.4)
NEUTROPHILS NFR BLD AUTO: 67.7 % — SIGNIFICANT CHANGE UP (ref 43–77)
PLAT MORPH BLD: NORMAL — SIGNIFICANT CHANGE UP
PLATELET # BLD AUTO: 155 K/UL — SIGNIFICANT CHANGE UP (ref 150–400)
RBC # BLD: 3.64 M/UL — LOW (ref 4.2–5.8)
RBC # FLD: 11.1 % — SIGNIFICANT CHANGE UP (ref 10.3–14.5)
RBC BLD AUTO: SIGNIFICANT CHANGE UP
WBC # BLD: 2.6 K/UL — LOW (ref 3.8–10.5)
WBC # FLD AUTO: 2.6 K/UL — LOW (ref 3.8–10.5)

## 2018-04-17 PROCEDURE — 77387B: CUSTOM | Mod: 26

## 2018-04-18 PROCEDURE — 77387B: CUSTOM | Mod: 26

## 2018-04-19 PROCEDURE — 77387B: CUSTOM | Mod: 26

## 2018-04-20 ENCOUNTER — RESULT REVIEW (OUTPATIENT)
Age: 63
End: 2018-04-20

## 2018-04-20 ENCOUNTER — LABORATORY RESULT (OUTPATIENT)
Age: 63
End: 2018-04-20

## 2018-04-20 ENCOUNTER — APPOINTMENT (OUTPATIENT)
Dept: INFUSION THERAPY | Facility: HOSPITAL | Age: 63
End: 2018-04-20

## 2018-04-20 LAB
EOSINOPHIL # BLD AUTO: 0 K/UL — SIGNIFICANT CHANGE UP (ref 0–0.5)
EOSINOPHIL NFR BLD AUTO: 4 % — SIGNIFICANT CHANGE UP (ref 0–6)
HCT VFR BLD CALC: 34.6 % — LOW (ref 39–50)
HGB BLD-MCNC: 12.6 G/DL — LOW (ref 13–17)
LYMPHOCYTES # BLD AUTO: 0.5 K/UL — LOW (ref 1–3.3)
LYMPHOCYTES # BLD AUTO: 25 % — SIGNIFICANT CHANGE UP (ref 13–44)
MCHC RBC-ENTMCNC: 34.6 PG — HIGH (ref 27–34)
MCHC RBC-ENTMCNC: 36.3 G/DL — HIGH (ref 32–36)
MCV RBC AUTO: 95.2 FL — SIGNIFICANT CHANGE UP (ref 80–100)
MONOCYTES # BLD AUTO: 0.3 K/UL — SIGNIFICANT CHANGE UP (ref 0–0.9)
MONOCYTES NFR BLD AUTO: 18 % — HIGH (ref 2–14)
NEUTROPHILS # BLD AUTO: 0.9 K/UL — LOW (ref 1.8–7.4)
NEUTROPHILS NFR BLD AUTO: 52 % — SIGNIFICANT CHANGE UP (ref 43–77)
NEUTS BAND # BLD: 1 % — SIGNIFICANT CHANGE UP (ref 0–8)
PLAT MORPH BLD: NORMAL — SIGNIFICANT CHANGE UP
PLATELET # BLD AUTO: 205 K/UL — SIGNIFICANT CHANGE UP (ref 150–400)
RBC # BLD: 3.63 M/UL — LOW (ref 4.2–5.8)
RBC # FLD: 11.6 % — SIGNIFICANT CHANGE UP (ref 10.3–14.5)
RBC BLD AUTO: SIGNIFICANT CHANGE UP
WBC # BLD: 1.7 K/UL — LOW (ref 3.8–10.5)
WBC # FLD AUTO: 1.7 K/UL — LOW (ref 3.8–10.5)

## 2018-04-20 PROCEDURE — 77427 RADIATION TX MANAGEMENT X5: CPT

## 2018-04-20 PROCEDURE — 77387B: CUSTOM | Mod: 26

## 2018-04-23 PROCEDURE — 77387B: CUSTOM | Mod: 26

## 2018-04-24 ENCOUNTER — RESULT REVIEW (OUTPATIENT)
Age: 63
End: 2018-04-24

## 2018-04-24 ENCOUNTER — APPOINTMENT (OUTPATIENT)
Dept: INFUSION THERAPY | Facility: HOSPITAL | Age: 63
End: 2018-04-24

## 2018-04-24 ENCOUNTER — LABORATORY RESULT (OUTPATIENT)
Age: 63
End: 2018-04-24

## 2018-04-24 LAB
BASOPHILS # BLD AUTO: 0 K/UL — SIGNIFICANT CHANGE UP (ref 0–0.2)
BASOPHILS NFR BLD AUTO: 0.4 % — SIGNIFICANT CHANGE UP (ref 0–2)
EOSINOPHIL # BLD AUTO: 0.1 K/UL — SIGNIFICANT CHANGE UP (ref 0–0.5)
EOSINOPHIL NFR BLD AUTO: 3.4 % — SIGNIFICANT CHANGE UP (ref 0–6)
HCT VFR BLD CALC: 34.7 % — LOW (ref 39–50)
HGB BLD-MCNC: 13 G/DL — SIGNIFICANT CHANGE UP (ref 13–17)
LYMPHOCYTES # BLD AUTO: 0.6 K/UL — LOW (ref 1–3.3)
LYMPHOCYTES # BLD AUTO: 13.7 % — SIGNIFICANT CHANGE UP (ref 13–44)
MCHC RBC-ENTMCNC: 35 PG — HIGH (ref 27–34)
MCHC RBC-ENTMCNC: 37.5 G/DL — HIGH (ref 32–36)
MCV RBC AUTO: 93.3 FL — SIGNIFICANT CHANGE UP (ref 80–100)
MONOCYTES # BLD AUTO: 0.7 K/UL — SIGNIFICANT CHANGE UP (ref 0–0.9)
MONOCYTES NFR BLD AUTO: 17.2 % — HIGH (ref 2–14)
NEUTROPHILS # BLD AUTO: 2.7 K/UL — SIGNIFICANT CHANGE UP (ref 1.8–7.4)
NEUTROPHILS NFR BLD AUTO: 65.3 % — SIGNIFICANT CHANGE UP (ref 43–77)
PLATELET # BLD AUTO: 384 K/UL — SIGNIFICANT CHANGE UP (ref 150–400)
RBC # BLD: 3.73 M/UL — LOW (ref 4.2–5.8)
RBC # FLD: 12 % — SIGNIFICANT CHANGE UP (ref 10.3–14.5)
WBC # BLD: 4.2 K/UL — SIGNIFICANT CHANGE UP (ref 3.8–10.5)
WBC # FLD AUTO: 4.2 K/UL — SIGNIFICANT CHANGE UP (ref 3.8–10.5)

## 2018-04-24 PROCEDURE — 77387B: CUSTOM | Mod: 26

## 2018-04-25 ENCOUNTER — OTHER (OUTPATIENT)
Age: 63
End: 2018-04-25

## 2018-04-25 ENCOUNTER — APPOINTMENT (OUTPATIENT)
Dept: INFUSION THERAPY | Facility: HOSPITAL | Age: 63
End: 2018-04-25

## 2018-04-25 PROCEDURE — 77387B: CUSTOM | Mod: 26

## 2018-04-26 ENCOUNTER — RESULT REVIEW (OUTPATIENT)
Age: 63
End: 2018-04-26

## 2018-04-26 ENCOUNTER — LABORATORY RESULT (OUTPATIENT)
Age: 63
End: 2018-04-26

## 2018-04-26 ENCOUNTER — APPOINTMENT (OUTPATIENT)
Dept: INFUSION THERAPY | Facility: HOSPITAL | Age: 63
End: 2018-04-26

## 2018-04-26 ENCOUNTER — OUTPATIENT (OUTPATIENT)
Dept: OUTPATIENT SERVICES | Facility: HOSPITAL | Age: 63
LOS: 1 days | Discharge: HOME | End: 2018-04-26

## 2018-04-26 DIAGNOSIS — C44.92 SQUAMOUS CELL CARCINOMA OF SKIN, UNSPECIFIED: ICD-10-CM

## 2018-04-26 DIAGNOSIS — Z98.890 OTHER SPECIFIED POSTPROCEDURAL STATES: Chronic | ICD-10-CM

## 2018-04-26 LAB
BASOPHILS # BLD AUTO: 0 K/UL — SIGNIFICANT CHANGE UP (ref 0–0.2)
BASOPHILS NFR BLD AUTO: 0 % — SIGNIFICANT CHANGE UP (ref 0–2)
EOSINOPHIL # BLD AUTO: 0 K/UL — SIGNIFICANT CHANGE UP (ref 0–0.5)
EOSINOPHIL NFR BLD AUTO: 0.1 % — SIGNIFICANT CHANGE UP (ref 0–6)
HCT VFR BLD CALC: 34.3 % — LOW (ref 39–50)
HGB BLD-MCNC: 12.4 G/DL — LOW (ref 13–17)
LYMPHOCYTES # BLD AUTO: 0.3 K/UL — LOW (ref 1–3.3)
LYMPHOCYTES # BLD AUTO: 2.5 % — LOW (ref 13–44)
MCHC RBC-ENTMCNC: 34.1 PG — HIGH (ref 27–34)
MCHC RBC-ENTMCNC: 36 G/DL — SIGNIFICANT CHANGE UP (ref 32–36)
MCV RBC AUTO: 94.6 FL — SIGNIFICANT CHANGE UP (ref 80–100)
MONOCYTES # BLD AUTO: 0.6 K/UL — SIGNIFICANT CHANGE UP (ref 0–0.9)
MONOCYTES NFR BLD AUTO: 5.5 % — SIGNIFICANT CHANGE UP (ref 2–14)
NEUTROPHILS # BLD AUTO: 10.3 K/UL — HIGH (ref 1.8–7.4)
NEUTROPHILS NFR BLD AUTO: 91.9 % — HIGH (ref 43–77)
PLATELET # BLD AUTO: 469 K/UL — HIGH (ref 150–400)
RBC # BLD: 3.63 M/UL — LOW (ref 4.2–5.8)
RBC # FLD: 12 % — SIGNIFICANT CHANGE UP (ref 10.3–14.5)
WBC # BLD: 11.2 K/UL — HIGH (ref 3.8–10.5)
WBC # FLD AUTO: 11.2 K/UL — HIGH (ref 3.8–10.5)

## 2018-04-26 PROCEDURE — 77387B: CUSTOM | Mod: 26

## 2018-04-27 ENCOUNTER — LABORATORY RESULT (OUTPATIENT)
Age: 63
End: 2018-04-27

## 2018-04-27 ENCOUNTER — RESULT REVIEW (OUTPATIENT)
Age: 63
End: 2018-04-27

## 2018-04-27 ENCOUNTER — APPOINTMENT (OUTPATIENT)
Dept: INFUSION THERAPY | Facility: HOSPITAL | Age: 63
End: 2018-04-27

## 2018-04-27 DIAGNOSIS — E86.0 DEHYDRATION: ICD-10-CM

## 2018-04-27 LAB
BASOPHILS # BLD AUTO: 0 K/UL — SIGNIFICANT CHANGE UP (ref 0–0.2)
BASOPHILS NFR BLD AUTO: 0.1 % — SIGNIFICANT CHANGE UP (ref 0–2)
EOSINOPHIL # BLD AUTO: 0 K/UL — SIGNIFICANT CHANGE UP (ref 0–0.5)
EOSINOPHIL NFR BLD AUTO: 0.3 % — SIGNIFICANT CHANGE UP (ref 0–6)
HCT VFR BLD CALC: 34.1 % — LOW (ref 39–50)
HGB BLD-MCNC: 12.7 G/DL — LOW (ref 13–17)
LYMPHOCYTES # BLD AUTO: 0.7 K/UL — LOW (ref 1–3.3)
LYMPHOCYTES # BLD AUTO: 7.6 % — LOW (ref 13–44)
MCHC RBC-ENTMCNC: 35.2 PG — HIGH (ref 27–34)
MCHC RBC-ENTMCNC: 37.1 G/DL — HIGH (ref 32–36)
MCV RBC AUTO: 94.8 FL — SIGNIFICANT CHANGE UP (ref 80–100)
MONOCYTES # BLD AUTO: 1 K/UL — HIGH (ref 0–0.9)
MONOCYTES NFR BLD AUTO: 11.8 % — SIGNIFICANT CHANGE UP (ref 2–14)
NEUTROPHILS # BLD AUTO: 6.9 K/UL — SIGNIFICANT CHANGE UP (ref 1.8–7.4)
NEUTROPHILS NFR BLD AUTO: 80.3 % — HIGH (ref 43–77)
PLATELET # BLD AUTO: 487 K/UL — HIGH (ref 150–400)
RBC # BLD: 3.6 M/UL — LOW (ref 4.2–5.8)
RBC # FLD: 12.1 % — SIGNIFICANT CHANGE UP (ref 10.3–14.5)
WBC # BLD: 8.6 K/UL — SIGNIFICANT CHANGE UP (ref 3.8–10.5)
WBC # FLD AUTO: 8.6 K/UL — SIGNIFICANT CHANGE UP (ref 3.8–10.5)

## 2018-04-27 PROCEDURE — 77387B: CUSTOM | Mod: 26

## 2018-04-27 PROCEDURE — 77427 RADIATION TX MANAGEMENT X5: CPT

## 2018-04-30 ENCOUNTER — APPOINTMENT (OUTPATIENT)
Dept: INFUSION THERAPY | Facility: HOSPITAL | Age: 63
End: 2018-04-30

## 2018-05-02 ENCOUNTER — APPOINTMENT (OUTPATIENT)
Dept: HEMATOLOGY ONCOLOGY | Facility: CLINIC | Age: 63
End: 2018-05-02
Payer: COMMERCIAL

## 2018-05-02 ENCOUNTER — APPOINTMENT (OUTPATIENT)
Dept: INFUSION THERAPY | Facility: HOSPITAL | Age: 63
End: 2018-05-02

## 2018-05-02 PROCEDURE — 99214 OFFICE O/P EST MOD 30 MIN: CPT

## 2018-05-04 ENCOUNTER — APPOINTMENT (OUTPATIENT)
Dept: INFUSION THERAPY | Facility: HOSPITAL | Age: 63
End: 2018-05-04

## 2018-05-07 ENCOUNTER — APPOINTMENT (OUTPATIENT)
Dept: INFUSION THERAPY | Facility: HOSPITAL | Age: 63
End: 2018-05-07

## 2018-05-09 ENCOUNTER — APPOINTMENT (OUTPATIENT)
Dept: INFUSION THERAPY | Facility: HOSPITAL | Age: 63
End: 2018-05-09

## 2018-05-11 ENCOUNTER — APPOINTMENT (OUTPATIENT)
Dept: INFUSION THERAPY | Facility: HOSPITAL | Age: 63
End: 2018-05-11

## 2018-05-15 ENCOUNTER — APPOINTMENT (OUTPATIENT)
Dept: INFUSION THERAPY | Facility: HOSPITAL | Age: 63
End: 2018-05-15

## 2018-05-15 ENCOUNTER — APPOINTMENT (OUTPATIENT)
Dept: OTOLARYNGOLOGY | Facility: CLINIC | Age: 63
End: 2018-05-15
Payer: COMMERCIAL

## 2018-05-15 VITALS
DIASTOLIC BLOOD PRESSURE: 56 MMHG | HEIGHT: 78 IN | SYSTOLIC BLOOD PRESSURE: 93 MMHG | WEIGHT: 150 LBS | BODY MASS INDEX: 17.36 KG/M2 | HEART RATE: 92 BPM

## 2018-05-15 PROCEDURE — 31575 DIAGNOSTIC LARYNGOSCOPY: CPT

## 2018-05-15 PROCEDURE — 99214 OFFICE O/P EST MOD 30 MIN: CPT | Mod: 25

## 2018-05-15 RX ORDER — OLANZAPINE 5 MG/1
5 TABLET, FILM COATED ORAL
Qty: 30 | Refills: 0 | Status: COMPLETED | COMMUNITY
Start: 2018-03-12 | End: 2018-05-15

## 2018-05-15 RX ORDER — GABAPENTIN 300 MG/1
300 CAPSULE ORAL 3 TIMES DAILY
Qty: 90 | Refills: 0 | Status: COMPLETED | COMMUNITY
Start: 2018-04-23 | End: 2018-05-15

## 2018-05-15 RX ORDER — DEXAMETHASONE 4 MG/1
4 TABLET ORAL
Qty: 4 | Refills: 6 | Status: COMPLETED | COMMUNITY
Start: 2018-03-12 | End: 2018-05-15

## 2018-05-27 LAB — CEA SERPL-MCNC: 2.6 NG/ML

## 2018-06-05 ENCOUNTER — APPOINTMENT (OUTPATIENT)
Dept: RADIATION ONCOLOGY | Facility: CLINIC | Age: 63
End: 2018-06-05
Payer: COMMERCIAL

## 2018-06-05 VITALS
RESPIRATION RATE: 18 BRPM | DIASTOLIC BLOOD PRESSURE: 78 MMHG | WEIGHT: 150 LBS | BODY MASS INDEX: 17.33 KG/M2 | SYSTOLIC BLOOD PRESSURE: 110 MMHG | HEART RATE: 90 BPM

## 2018-06-05 PROCEDURE — 99024 POSTOP FOLLOW-UP VISIT: CPT | Mod: GC

## 2018-06-05 PROCEDURE — 31575 DIAGNOSTIC LARYNGOSCOPY: CPT

## 2018-07-02 ENCOUNTER — APPOINTMENT (OUTPATIENT)
Dept: PULMONOLOGY | Facility: CLINIC | Age: 63
End: 2018-07-02
Payer: COMMERCIAL

## 2018-07-02 VITALS
OXYGEN SATURATION: 96 % | DIASTOLIC BLOOD PRESSURE: 70 MMHG | RESPIRATION RATE: 16 BRPM | WEIGHT: 160 LBS | SYSTOLIC BLOOD PRESSURE: 106 MMHG | BODY MASS INDEX: 21.67 KG/M2 | TEMPERATURE: 98.6 F | HEIGHT: 72 IN | HEART RATE: 80 BPM

## 2018-07-02 DIAGNOSIS — G62.9 POLYNEUROPATHY, UNSPECIFIED: ICD-10-CM

## 2018-07-02 PROCEDURE — 99213 OFFICE O/P EST LOW 20 MIN: CPT

## 2018-07-02 RX ORDER — PREDNISONE 10 MG/1
10 TABLET ORAL
Qty: 33 | Refills: 0 | Status: DISCONTINUED | COMMUNITY
Start: 2018-01-04 | End: 2018-07-02

## 2018-07-02 RX ORDER — LIDOCAINE HYDROCHLORIDE 20 MG/ML
2 SOLUTION OROPHARYNGEAL
Qty: 240 | Refills: 4 | Status: DISCONTINUED | COMMUNITY
Start: 2018-04-09 | End: 2018-07-02

## 2018-07-02 RX ORDER — LIDOCAINE HYDROCHLORIDE 20 MG/ML
2 SOLUTION OROPHARYNGEAL
Qty: 240 | Refills: 4 | Status: DISCONTINUED | COMMUNITY
Start: 2018-04-25 | End: 2018-07-02

## 2018-07-02 RX ORDER — LIDOCAINE HYDROCHLORIDE 20 MG/ML
2 SOLUTION OROPHARYNGEAL
Qty: 240 | Refills: 4 | Status: DISCONTINUED | COMMUNITY
Start: 2018-04-16 | End: 2018-07-02

## 2018-07-02 RX ORDER — LIDOCAINE HYDROCHLORIDE 20 MG/ML
2 SOLUTION OROPHARYNGEAL
Qty: 1 | Refills: 0 | Status: DISCONTINUED | COMMUNITY
Start: 2018-04-23 | End: 2018-07-02

## 2018-07-02 RX ORDER — OMEPRAZOLE 40 MG/1
40 CAPSULE, DELAYED RELEASE ORAL
Qty: 60 | Refills: 0 | Status: DISCONTINUED | COMMUNITY
Start: 2017-12-14 | End: 2018-07-02

## 2018-07-18 PROBLEM — J38.1 POLYP OF VOCAL CORD AND LARYNX: Chronic | Status: ACTIVE | Noted: 2018-01-31

## 2018-07-22 PROBLEM — Z78.9 ALCOHOL USE: Status: ACTIVE | Noted: 2017-01-11

## 2018-07-23 ENCOUNTER — APPOINTMENT (OUTPATIENT)
Dept: GASTROENTEROLOGY | Facility: CLINIC | Age: 63
End: 2018-07-23
Payer: COMMERCIAL

## 2018-07-23 VITALS
WEIGHT: 160 LBS | OXYGEN SATURATION: 98 % | SYSTOLIC BLOOD PRESSURE: 110 MMHG | TEMPERATURE: 98 F | HEIGHT: 72 IN | BODY MASS INDEX: 21.67 KG/M2 | DIASTOLIC BLOOD PRESSURE: 70 MMHG | HEART RATE: 68 BPM

## 2018-07-23 PROCEDURE — 99214 OFFICE O/P EST MOD 30 MIN: CPT

## 2018-07-26 ENCOUNTER — FORM ENCOUNTER (OUTPATIENT)
Age: 63
End: 2018-07-26

## 2018-07-27 ENCOUNTER — OUTPATIENT (OUTPATIENT)
Dept: OUTPATIENT SERVICES | Facility: HOSPITAL | Age: 63
LOS: 1 days | End: 2018-07-27
Payer: COMMERCIAL

## 2018-07-27 ENCOUNTER — APPOINTMENT (OUTPATIENT)
Dept: NUCLEAR MEDICINE | Facility: IMAGING CENTER | Age: 63
End: 2018-07-27
Payer: COMMERCIAL

## 2018-07-27 ENCOUNTER — APPOINTMENT (OUTPATIENT)
Dept: CT IMAGING | Facility: IMAGING CENTER | Age: 63
End: 2018-07-27
Payer: COMMERCIAL

## 2018-07-27 DIAGNOSIS — Z98.890 OTHER SPECIFIED POSTPROCEDURAL STATES: Chronic | ICD-10-CM

## 2018-07-27 DIAGNOSIS — C32.9 MALIGNANT NEOPLASM OF LARYNX, UNSPECIFIED: ICD-10-CM

## 2018-07-27 PROCEDURE — 78815 PET IMAGE W/CT SKULL-THIGH: CPT | Mod: 26,PS

## 2018-07-29 ENCOUNTER — FORM ENCOUNTER (OUTPATIENT)
Age: 63
End: 2018-07-29

## 2018-07-30 ENCOUNTER — APPOINTMENT (OUTPATIENT)
Dept: OTOLARYNGOLOGY | Facility: CLINIC | Age: 63
End: 2018-07-30
Payer: COMMERCIAL

## 2018-07-30 VITALS
DIASTOLIC BLOOD PRESSURE: 89 MMHG | BODY MASS INDEX: 21.43 KG/M2 | WEIGHT: 158 LBS | SYSTOLIC BLOOD PRESSURE: 150 MMHG | HEART RATE: 96 BPM

## 2018-07-30 PROCEDURE — 99214 OFFICE O/P EST MOD 30 MIN: CPT | Mod: 25

## 2018-07-30 PROCEDURE — 70491 CT SOFT TISSUE NECK W/DYE: CPT | Mod: 26

## 2018-07-30 PROCEDURE — 82565 ASSAY OF CREATININE: CPT

## 2018-07-30 PROCEDURE — A9552: CPT

## 2018-07-30 PROCEDURE — 70491 CT SOFT TISSUE NECK W/DYE: CPT

## 2018-07-30 PROCEDURE — 31575 DIAGNOSTIC LARYNGOSCOPY: CPT

## 2018-07-30 PROCEDURE — 78815 PET IMAGE W/CT SKULL-THIGH: CPT

## 2018-08-23 ENCOUNTER — APPOINTMENT (OUTPATIENT)
Dept: GASTROENTEROLOGY | Facility: AMBULATORY MEDICAL SERVICES | Age: 63
End: 2018-08-23
Payer: COMMERCIAL

## 2018-08-23 PROCEDURE — 45380 COLONOSCOPY AND BIOPSY: CPT | Mod: 33,59

## 2018-08-23 PROCEDURE — 45385 COLONOSCOPY W/LESION REMOVAL: CPT | Mod: 33,59

## 2018-09-07 ENCOUNTER — APPOINTMENT (OUTPATIENT)
Dept: RADIATION ONCOLOGY | Facility: CLINIC | Age: 63
End: 2018-09-07
Payer: COMMERCIAL

## 2018-09-07 VITALS
RESPIRATION RATE: 16 BRPM | BODY MASS INDEX: 22.5 KG/M2 | DIASTOLIC BLOOD PRESSURE: 73 MMHG | OXYGEN SATURATION: 97 % | HEIGHT: 72 IN | SYSTOLIC BLOOD PRESSURE: 127 MMHG | WEIGHT: 166.12 LBS | HEART RATE: 75 BPM | TEMPERATURE: 98.5 F

## 2018-09-07 PROCEDURE — 99214 OFFICE O/P EST MOD 30 MIN: CPT

## 2018-10-03 ENCOUNTER — APPOINTMENT (OUTPATIENT)
Dept: GASTROENTEROLOGY | Facility: CLINIC | Age: 63
End: 2018-10-03
Payer: COMMERCIAL

## 2018-10-03 VITALS
BODY MASS INDEX: 22.75 KG/M2 | TEMPERATURE: 98.6 F | HEIGHT: 72 IN | DIASTOLIC BLOOD PRESSURE: 75 MMHG | SYSTOLIC BLOOD PRESSURE: 115 MMHG | OXYGEN SATURATION: 98 % | HEART RATE: 79 BPM | RESPIRATION RATE: 17 BRPM | WEIGHT: 168 LBS

## 2018-10-03 PROCEDURE — 99214 OFFICE O/P EST MOD 30 MIN: CPT

## 2018-11-28 ENCOUNTER — FORM ENCOUNTER (OUTPATIENT)
Age: 63
End: 2018-11-28

## 2018-11-29 ENCOUNTER — OUTPATIENT (OUTPATIENT)
Dept: OUTPATIENT SERVICES | Facility: HOSPITAL | Age: 63
LOS: 1 days | End: 2018-11-29
Payer: COMMERCIAL

## 2018-11-29 ENCOUNTER — APPOINTMENT (OUTPATIENT)
Dept: CT IMAGING | Facility: IMAGING CENTER | Age: 63
End: 2018-11-29
Payer: COMMERCIAL

## 2018-11-29 DIAGNOSIS — Z98.890 OTHER SPECIFIED POSTPROCEDURAL STATES: Chronic | ICD-10-CM

## 2018-11-29 DIAGNOSIS — C32.9 MALIGNANT NEOPLASM OF LARYNX, UNSPECIFIED: ICD-10-CM

## 2018-11-29 PROCEDURE — 70491 CT SOFT TISSUE NECK W/DYE: CPT

## 2018-11-29 PROCEDURE — 71260 CT THORAX DX C+: CPT

## 2018-11-29 PROCEDURE — 70491 CT SOFT TISSUE NECK W/DYE: CPT | Mod: 26

## 2018-11-29 PROCEDURE — 71260 CT THORAX DX C+: CPT | Mod: 26

## 2018-12-11 ENCOUNTER — APPOINTMENT (OUTPATIENT)
Dept: OTOLARYNGOLOGY | Facility: CLINIC | Age: 63
End: 2018-12-11
Payer: COMMERCIAL

## 2018-12-11 VITALS
WEIGHT: 169 LBS | DIASTOLIC BLOOD PRESSURE: 74 MMHG | HEART RATE: 76 BPM | SYSTOLIC BLOOD PRESSURE: 117 MMHG | BODY MASS INDEX: 22.92 KG/M2

## 2018-12-11 PROCEDURE — 31575 DIAGNOSTIC LARYNGOSCOPY: CPT

## 2018-12-11 PROCEDURE — 99214 OFFICE O/P EST MOD 30 MIN: CPT | Mod: 25

## 2019-01-15 ENCOUNTER — APPOINTMENT (OUTPATIENT)
Dept: OTOLARYNGOLOGY | Facility: CLINIC | Age: 64
End: 2019-01-15
Payer: COMMERCIAL

## 2019-01-15 VITALS
SYSTOLIC BLOOD PRESSURE: 126 MMHG | WEIGHT: 169 LBS | BODY MASS INDEX: 22.89 KG/M2 | DIASTOLIC BLOOD PRESSURE: 80 MMHG | HEIGHT: 72 IN | HEART RATE: 67 BPM

## 2019-01-15 PROCEDURE — 31575 DIAGNOSTIC LARYNGOSCOPY: CPT

## 2019-01-15 PROCEDURE — 99214 OFFICE O/P EST MOD 30 MIN: CPT | Mod: 25

## 2019-01-28 NOTE — PHYSICAL EXAM
[de-identified] : Posttreatment changes, no LAD. [Laryngoscopy Performed] : laryngoscopy was performed, see procedure section for findings [FreeTextEntry1] : No concerning lesions in the OC/OPx on inspection or palpation. Posttreatment changes. [Normal] : no rashes

## 2019-01-28 NOTE — CONSULT LETTER
[Dear  ___] : Dear ~KRYS, [Courtesy Letter:] : I had the pleasure of seeing your patient, [unfilled], in my office today. [Please see my note below.] : Please see my note below. [Consult Closing:] : Thank you very much for allowing me to participate in the care of this patient.  If you have any questions, please do not hesitate to contact me. [Sincerely,] : Sincerely, [FreeTextEntry2] : Dr. Oscar Delong\par [address]\[place] [address]  [FreeTextEntry3] : Dev

## 2019-01-28 NOTE — HISTORY OF PRESENT ILLNESS
[de-identified] : 63M with SCCa larynx s/p chemo/RT completed 4/2018 presents for follow up.  Pt c/o voice hoarseness over the last month.  Denies pain, dysphagia, odynophagia, SOB.  Last scans were 11/29/18.  He notices his symptoms are worse when he is not careful about using PPE at work.  His weight remains stable.

## 2019-01-30 ENCOUNTER — APPOINTMENT (OUTPATIENT)
Dept: CARDIOTHORACIC SURGERY | Facility: CLINIC | Age: 64
End: 2019-01-30
Payer: COMMERCIAL

## 2019-01-30 VITALS
SYSTOLIC BLOOD PRESSURE: 129 MMHG | OXYGEN SATURATION: 99 % | DIASTOLIC BLOOD PRESSURE: 73 MMHG | RESPIRATION RATE: 14 BRPM | HEIGHT: 72 IN | WEIGHT: 165 LBS | TEMPERATURE: 99.1 F | BODY MASS INDEX: 22.35 KG/M2 | HEART RATE: 74 BPM

## 2019-01-30 DIAGNOSIS — Z80.9 FAMILY HISTORY OF MALIGNANT NEOPLASM, UNSPECIFIED: ICD-10-CM

## 2019-01-30 PROCEDURE — 99204 OFFICE O/P NEW MOD 45 MIN: CPT

## 2019-01-30 RX ORDER — ISOPROPYL ALCOHOL 70 ML/100ML
SWAB TOPICAL
Refills: 0 | Status: COMPLETED | COMMUNITY
End: 2019-01-30

## 2019-02-06 NOTE — PHYSICAL EXAM
[General Appearance - Alert] : alert [Sclera] : the sclera and conjunctiva were normal [Outer Ear] : the ears and nose were normal in appearance [Neck Appearance] : the appearance of the neck was normal [] : no respiratory distress [Apical Impulse] : the apical impulse was normal [Examination Of The Chest] : the chest was normal in appearance [Breast Appearance] : normal in appearance [Bowel Sounds] : normal bowel sounds [No CVA Tenderness] : no ~M costovertebral angle tenderness [Involuntary Movements] : no involuntary movements were seen [Skin Color & Pigmentation] : normal skin color and pigmentation [No Focal Deficits] : no focal deficits [Oriented To Time, Place, And Person] : oriented to person, place, and time [General Appearance - Well Nourished] : well nourished [General Appearance - Well Developed] : well developed [Respiration, Rhythm And Depth] : normal respiratory rhythm and effort [Auscultation Breath Sounds / Voice Sounds] : lungs were clear to auscultation bilaterally [Heart Rate And Rhythm] : heart rate was normal and rhythm regular [Heart Sounds] : normal S1 and S2 [2+] : left 2+ [FreeTextEntry1] : Deferred

## 2019-02-06 NOTE — ASSESSMENT
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 He had an incidental finding on CT  Chest revealed Aortic root is dilated and measures 4.5 cm at sinuses of Valsalva. He complaints of hoarseness of voice on/off, otherwise asymptomatic. He is here for surgical consultation and management for Aortic root Aneurysm. Denies any chest pain, palpitations, back pain, pedal edema, shortness of breath  or dizziness .\par \par \par  I reviewed the cardiac imaging, medical records and reports with patient and discussed the case. 11/29/18 CT  Chest revealed Aortic root is dilated and measures 4.5 cm at sinuses of Valsalva. Bilateral Pulmonary nodules, largest nodules in Left upper lobe and Left lower lobe remains unchanged . I have also reviewed the indications for surgery, and  illustrate the aorta and anatomy of the heart. The patient does not meet size criteria for surgical intervention at the time. Therefore, I have recommended that the patient will require a follow up appointment in one year with  [facility]  to monitor his  aortic pathology. \par \par \par Plan:\par 1) Follow up with [facility] in one year\par 2)Start Toprol XL 25 mg daily for blood pressure management\par 3) May return to clinic on PRN basis\par 4) Call back in one week with blood pressure readings\par 5) Call back with symptoms of back pain , shortness of breath or chest pain\par 6) No heavy lifting \par 7) Follow up with Cardiologist and PCP

## 2019-02-06 NOTE — CONSULT LETTER
[FreeTextEntry2] : Regis Mandel M.D.\par Head and Neck Surgery\par [facility]   \par [address]\[place], [place], [address]\par [phone]  Fax:  [phone] [FreeTextEntry3] : Derick Gar MD\par Director\par [facility]\par Professor \par Cardiovascular & Thoracic Surgery\par [employer].\par \par

## 2019-02-06 NOTE — HISTORY OF PRESENT ILLNESS
[FreeTextEntry1] : This is a 63 year old male with past medical history of Small cell Carcinoma of Larynx( S/P\par Chemo/Radiation completed on 4/2018) , COPD, Tubular adenoma of Colon( s/p Polypectomy) and Aortic root aneurysm. He had an incidental finding on CT  Chest revealed Aortic root is dilated and measures 4.5 cm at sinuses of Valsalva. He complaints of hoarseness of voice on/off, otherwise asymptomatic. He is here for surgical consultation and management for Aortic root Aneurysm. Denies any chest pain, palpitations, back pain, pedal edema, shortness of breath  or dizziness

## 2019-02-06 NOTE — DATA REVIEWED
[FreeTextEntry1] : 11/29/18 CT  Chest revealed Aortic root is dilated and measures 4.5 cm at sinuses of Valsalva. Bilateral Pulmonary nodules, largest nodules in Left upper lobe and Left lower lobe remains unchanged.

## 2019-02-06 NOTE — REVIEW OF SYSTEMS
[Hoarseness] : hoarseness [Negative] : Heme/Lymph [Fever] : no fever [Chills] : no chills [Chest Pain] : no chest pain [Palpitations] : no palpitations [Lower Ext Edema] : no extremity edema [Shortness Of Breath] : no shortness of breath [Cough] : no cough [SOB on Exertion] : no shortness of breath during exertion [Arthralgias] : no arthralgias [Joint Pain] : no joint pain [Dizziness] : no dizziness [Fainting] : no fainting [Anxiety] : no anxiety [Easy Bleeding] : no tendency for easy bleeding [Easy Bruising] : no tendency for easy bruising [FreeTextEntry4] : on/off

## 2019-02-15 ENCOUNTER — INBOUND DOCUMENT (OUTPATIENT)
Age: 64
End: 2019-02-15

## 2019-03-07 ENCOUNTER — APPOINTMENT (OUTPATIENT)
Dept: RADIATION ONCOLOGY | Facility: CLINIC | Age: 64
End: 2019-03-07
Payer: COMMERCIAL

## 2019-03-07 VITALS
RESPIRATION RATE: 15 BRPM | BODY MASS INDEX: 23.08 KG/M2 | OXYGEN SATURATION: 98 % | DIASTOLIC BLOOD PRESSURE: 65 MMHG | WEIGHT: 170.41 LBS | TEMPERATURE: 99 F | HEIGHT: 72 IN | HEART RATE: 75 BPM | SYSTOLIC BLOOD PRESSURE: 108 MMHG

## 2019-03-07 PROCEDURE — 99214 OFFICE O/P EST MOD 30 MIN: CPT | Mod: GC

## 2019-03-08 NOTE — HISTORY OF PRESENT ILLNESS
[FreeTextEntry1] : Mr. Thompson Peck is a 63-year-old man with squamous cell carcinoma of glottis, Stage III, T3 N0 M0. He was treated with definitive chemo-radiation, 70 Gy in 35 fractions with concurrent cisplatin q 3 wks, completed 4/27/18.  \par \par He states his taste has returned to normal.  Weight stable. He denies dysphagia, odynophagia hemoptysis, aspiration, dyspnea or cough. Still F/U with med Onc and ENT Dr Mandel. \par \par PET scan in 7/2018 showed interval resolution of hypermetabolism in the region of the right aryepiglottic fold as well as activity and fixed margin of the epiglottis superiorly as compared to PET/CT from 2/23/2018. Mild residual activity along the right false vocal cord, post treatment changes versus residual disease. New nonspecific mild asymmetric hypermetabolism in the left tonsillar region. Stable nonspecific multiple mildly hypermetabolic mediastinal and bilateral perihilar lymph nodes, distal esophagus and partial opacification of right greater than left maxillary sinuses. Resolution of focal hypermetabolism in the region of the right retromolar trigone. \par \par CT scan in 7/2018 IMPRESSION: Interval post treatment sequela with loss of soft tissue planes and smoothing of mucosal surfaces throughout the glottic and paraglottic region with posterior displaced epiglottis. False and true cords adduct each other in the midline limiting assessment. Dental disease with periapical lucency. Retention cysts/polyps in the maxillary antra. \par \par CT chest/neck in 11/2018 showed aortic root dilation and bilateral pulmonary nodules (stable). No evidence of recurrence. \par \par He followed up with Dr. Mandel in 01/2019 with negative scope. He follows with cardiothoracic surgery for aortic root aneurysm. He has had intermittent tinnitus without pain or significant hearing loss. Audiology evaluation did not qualify him for a hearing aid. He denies any pain, dry mouth, taste changes and is tolerating a normal diet. He was started on Toprol XL by his cardiologist but has stopped it, in accordance with his cardiologist's recommendation and monitors his blood pressure. If it > 140 mm Hg he will contact the clinic.  \par \par \par

## 2019-03-08 NOTE — REVIEW OF SYSTEMS
[Negative] : Allergic/Immunologic [Blurred Vision: Grade 0] : Blurred Vision: Grade 0 [Mucositis Oral: Grade 0] : Mucositis Oral: Grade 0  [Xerostomia: Grade 0] : Xerostomia: Grade 0 [Oral Pain: Grade 0] : Oral Pain: Grade 0 [Salivary duct inflammation: Grade 0] : Salivary duct inflammation: Grade 0 [Dysgeusia: Grade 0] : Dysgeusia: Grade 0 [Alopecia: Grade 0] : Alopecia: Grade 0 [Pruritus: Grade 0] : Pruritus: Grade 0 [Skin Atrophy: Grade 0] : Skin Atrophy: Grade 0 [Skin Hyperpigmentation: Grade 0] : Skin Hyperpigmentation: Grade 0 [Skin Induration: Grade 0] : Skin Induration: Grade 0 [Dermatitis Radiation: Grade 0] : Dermatitis Radiation: Grade 0 [Tinnitus: Grade 1 - Mild symptoms; intervention not indicated] : Tinnitus: Grade 1 - Mild symptoms; intervention not indicated [FreeTextEntry4] : s/p radiation to throat 4/2018

## 2019-04-01 ENCOUNTER — FORM ENCOUNTER (OUTPATIENT)
Age: 64
End: 2019-04-01

## 2019-04-01 ENCOUNTER — APPOINTMENT (OUTPATIENT)
Dept: GASTROENTEROLOGY | Facility: CLINIC | Age: 64
End: 2019-04-01
Payer: COMMERCIAL

## 2019-04-01 VITALS
HEART RATE: 82 BPM | OXYGEN SATURATION: 99 % | SYSTOLIC BLOOD PRESSURE: 120 MMHG | BODY MASS INDEX: 23.57 KG/M2 | TEMPERATURE: 98.6 F | WEIGHT: 174 LBS | HEIGHT: 72 IN | DIASTOLIC BLOOD PRESSURE: 70 MMHG

## 2019-04-01 DIAGNOSIS — D12.6 BENIGN NEOPLASM OF COLON, UNSPECIFIED: ICD-10-CM

## 2019-04-01 PROCEDURE — 99214 OFFICE O/P EST MOD 30 MIN: CPT

## 2019-04-01 RX ORDER — METOPROLOL SUCCINATE 25 MG/1
25 TABLET, EXTENDED RELEASE ORAL DAILY
Qty: 90 | Refills: 0 | Status: DISCONTINUED | COMMUNITY
Start: 2019-01-30 | End: 2019-04-01

## 2019-04-01 NOTE — HISTORY OF PRESENT ILLNESS
[FreeTextEntry1] : Patient is a 63-year-old gentleman who was diagnosed with laryngeal cancer and 2/2018. This was followed by radiation therapy and chemotherapy. He finished treatment several months ago. His appetite and weight have improved.\par Patient had a colon polyp removed in 3/2017. This revealed a tubular adenoma with high-grade dysplasia.  He denies seeing any blood or mucus in the stool.\par Patient had a recent colonoscopy which revealed a tubular adenoma and a hyperplastic polyp that were removed. He has returned to work and his bowel movements are somewhat irregular but are starting to return to normal.\par c/o occasional constipation.

## 2019-04-01 NOTE — REVIEW OF SYSTEMS
[As noted in HPI] : as noted in HPI [As Noted in HPI] : as noted in HPI [Negative] : Heme/Lymph [FreeTextEntry4] : Laryngeal cancer

## 2019-04-01 NOTE — ASSESSMENT
[FreeTextEntry1] : Patient with history of tubular adenoma with high-grade dysplasia removed. He does have some bouts of constipation and will be given Citrucel 1 teaspoon b.i.d. and Colace 100 mg daily.\par

## 2019-04-02 ENCOUNTER — OUTPATIENT (OUTPATIENT)
Dept: OUTPATIENT SERVICES | Facility: HOSPITAL | Age: 64
LOS: 1 days | End: 2019-04-02
Payer: COMMERCIAL

## 2019-04-02 ENCOUNTER — NON-APPOINTMENT (OUTPATIENT)
Age: 64
End: 2019-04-02

## 2019-04-02 ENCOUNTER — APPOINTMENT (OUTPATIENT)
Age: 64
End: 2019-04-02
Payer: COMMERCIAL

## 2019-04-02 DIAGNOSIS — Z98.890 OTHER SPECIFIED POSTPROCEDURAL STATES: Chronic | ICD-10-CM

## 2019-04-02 DIAGNOSIS — C32.9 MALIGNANT NEOPLASM OF LARYNX, UNSPECIFIED: ICD-10-CM

## 2019-04-02 PROCEDURE — 71260 CT THORAX DX C+: CPT | Mod: 26

## 2019-04-02 PROCEDURE — 82565 ASSAY OF CREATININE: CPT

## 2019-04-02 PROCEDURE — 70491 CT SOFT TISSUE NECK W/DYE: CPT | Mod: 26

## 2019-04-02 PROCEDURE — 70491 CT SOFT TISSUE NECK W/DYE: CPT

## 2019-04-02 PROCEDURE — 71260 CT THORAX DX C+: CPT

## 2019-04-16 ENCOUNTER — APPOINTMENT (OUTPATIENT)
Dept: OTOLARYNGOLOGY | Facility: CLINIC | Age: 64
End: 2019-04-16
Payer: COMMERCIAL

## 2019-04-16 VITALS
HEART RATE: 69 BPM | SYSTOLIC BLOOD PRESSURE: 120 MMHG | WEIGHT: 174 LBS | DIASTOLIC BLOOD PRESSURE: 80 MMHG | BODY MASS INDEX: 23.57 KG/M2 | HEIGHT: 72 IN

## 2019-04-16 PROCEDURE — 99214 OFFICE O/P EST MOD 30 MIN: CPT | Mod: 25

## 2019-04-16 PROCEDURE — 31575 DIAGNOSTIC LARYNGOSCOPY: CPT

## 2019-04-16 RX ORDER — METHYLCELLULOSE 2 G/10.2G
POWDER, FOR SOLUTION ORAL
Qty: 1 | Refills: 0 | Status: COMPLETED | OUTPATIENT
Start: 2019-04-01 | End: 2019-04-16

## 2019-04-16 RX ORDER — DOCUSATE SODIUM 100 MG/1
100 CAPSULE ORAL
Qty: 30 | Refills: 3 | Status: COMPLETED | OUTPATIENT
Start: 2019-04-01 | End: 2019-04-16

## 2019-05-21 ENCOUNTER — APPOINTMENT (OUTPATIENT)
Dept: OTOLARYNGOLOGY | Facility: CLINIC | Age: 64
End: 2019-05-21

## 2019-05-30 NOTE — HISTORY OF PRESENT ILLNESS
[de-identified] : 64M with SCCa larynx s/p chemo/RT completed 4/2018 presents for follow up.  Pt had scans on 4/2/19 which did not show recurrence.  Pt feels well.   Denies pain, dysphagia, odynophagia, SOB. Last scans were 11/29/18.  He feels that his voice is now stable with careful use of PPE at work.  His weight remains stable. \par

## 2019-05-30 NOTE — PROCEDURE
[Flexible Endoscope] : examined with the flexible endoscope [Serial Number: ___] : Serial Number: [unfilled] [None] : none [de-identified] : No lesions in the NPx, OPx, HPx or larynx.  Stable posttreatment changes, VC are mobile, airway patent.\par  [de-identified] : Larynx SCCa

## 2019-05-30 NOTE — CONSULT LETTER
[Dear  ___] : Dear ~KRYS, [Courtesy Letter:] : I had the pleasure of seeing your patient, [unfilled], in my office today. [Please see my note below.] : Please see my note below. [Consult Closing:] : Thank you very much for allowing me to participate in the care of this patient.  If you have any questions, please do not hesitate to contact me. [Sincerely,] : Sincerely, [FreeTextEntry2] : Dr. Oscar Delong\par [address]\[place] [address]  [FreeTextEntry3] : Dev

## 2019-05-30 NOTE — PHYSICAL EXAM
[de-identified] : Posttreatment changes, no LAD. [Laryngoscopy Performed] : laryngoscopy was performed, see procedure section for findings [FreeTextEntry1] : No concerning lesions in the OC/OPx on inspection or palpation. Posttreatment changes. [Normal] : no rashes
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
## 2019-07-04 ENCOUNTER — FORM ENCOUNTER (OUTPATIENT)
Age: 64
End: 2019-07-04

## 2019-07-05 ENCOUNTER — OUTPATIENT (OUTPATIENT)
Dept: OUTPATIENT SERVICES | Facility: HOSPITAL | Age: 64
LOS: 1 days | End: 2019-07-05
Payer: COMMERCIAL

## 2019-07-05 ENCOUNTER — APPOINTMENT (OUTPATIENT)
Dept: CT IMAGING | Facility: CLINIC | Age: 64
End: 2019-07-05

## 2019-07-05 DIAGNOSIS — Z98.890 OTHER SPECIFIED POSTPROCEDURAL STATES: Chronic | ICD-10-CM

## 2019-07-05 DIAGNOSIS — C32.9 MALIGNANT NEOPLASM OF LARYNX, UNSPECIFIED: ICD-10-CM

## 2019-07-05 PROCEDURE — 71260 CT THORAX DX C+: CPT | Mod: 26

## 2019-07-05 PROCEDURE — 82565 ASSAY OF CREATININE: CPT

## 2019-07-05 PROCEDURE — 71260 CT THORAX DX C+: CPT

## 2019-07-05 PROCEDURE — 70491 CT SOFT TISSUE NECK W/DYE: CPT | Mod: 26

## 2019-07-05 PROCEDURE — 70491 CT SOFT TISSUE NECK W/DYE: CPT

## 2019-07-15 ENCOUNTER — APPOINTMENT (OUTPATIENT)
Dept: OTOLARYNGOLOGY | Facility: CLINIC | Age: 64
End: 2019-07-15
Payer: COMMERCIAL

## 2019-07-15 VITALS
DIASTOLIC BLOOD PRESSURE: 78 MMHG | HEIGHT: 72 IN | HEART RATE: 59 BPM | WEIGHT: 173 LBS | BODY MASS INDEX: 23.43 KG/M2 | SYSTOLIC BLOOD PRESSURE: 126 MMHG

## 2019-07-15 PROCEDURE — 99213 OFFICE O/P EST LOW 20 MIN: CPT | Mod: 25

## 2019-07-15 PROCEDURE — 31575 DIAGNOSTIC LARYNGOSCOPY: CPT

## 2019-07-15 NOTE — PROCEDURE
[Oxymetazoline HCl] : oxymetazoline HCl [Topical Lidocaine] : topical lidocaine [Flexible Endoscope] : examined with the flexible endoscope [Serial Number: ___] : Serial Number: [unfilled] [de-identified] : No lesions in the NPx, OPx, HPx or larynx.  Stable posttreatment changes, VC are mobile, airway patent.\par  [de-identified] : Larynx SCCa

## 2019-07-15 NOTE — PHYSICAL EXAM
[Laryngoscopy Performed] : laryngoscopy was performed, see procedure section for findings [Normal] : no rashes [de-identified] : Posttreatment changes, no LAD. [FreeTextEntry1] : No concerning lesions in the OC/OPx on inspection or palpation. Posttreatment changes.

## 2019-07-15 NOTE — HISTORY OF PRESENT ILLNESS
[de-identified] : 64M with SCCa larynx s/p chemo/RT completed 4/2018 presents for follow up. Pt had scans on 7/5/19 which did not show recurrence. Pt denies dysphonia, dysphagia, pain, SOB, otalgia.  His weight is stable.\par

## 2019-07-15 NOTE — DATA REVIEWED
[de-identified] : CT Neck and Chest with ABENA.  The dilation of the aortic root is likely stable per report.

## 2019-07-15 NOTE — CONSULT LETTER
[Dear  ___] : Dear ~KRYS, [Courtesy Letter:] : I had the pleasure of seeing your patient, [unfilled], in my office today. [Please see my note below.] : Please see my note below. [Consult Closing:] : Thank you very much for allowing me to participate in the care of this patient.  If you have any questions, please do not hesitate to contact me. [Sincerely,] : Sincerely, [FreeTextEntry2] : Dr. Oscar Delong\par [address]\[place] [address]  [FreeTextEntry3] : Dev

## 2019-09-26 ENCOUNTER — APPOINTMENT (OUTPATIENT)
Dept: RADIATION ONCOLOGY | Facility: CLINIC | Age: 64
End: 2019-09-26
Payer: COMMERCIAL

## 2019-09-26 VITALS
DIASTOLIC BLOOD PRESSURE: 73 MMHG | TEMPERATURE: 96.5 F | RESPIRATION RATE: 18 BRPM | HEART RATE: 77 BPM | OXYGEN SATURATION: 99 % | WEIGHT: 165.67 LBS | BODY MASS INDEX: 22.47 KG/M2 | SYSTOLIC BLOOD PRESSURE: 118 MMHG

## 2019-09-26 PROCEDURE — 99214 OFFICE O/P EST MOD 30 MIN: CPT

## 2019-09-30 NOTE — REVIEW OF SYSTEMS
[Negative] : Heme/Lymph [Tinnitus: Grade 1 - Mild symptoms; intervention not indicated] : Tinnitus: Grade 1 - Mild symptoms; intervention not indicated [Blurred Vision: Grade 0] : Blurred Vision: Grade 0 [Mucositis Oral: Grade 0] : Mucositis Oral: Grade 0  [Xerostomia: Grade 0] : Xerostomia: Grade 0 [Oral Pain: Grade 0] : Oral Pain: Grade 0 [Salivary duct inflammation: Grade 0] : Salivary duct inflammation: Grade 0 [Dysgeusia: Grade 0] : Dysgeusia: Grade 0 [Alopecia: Grade 0] : Alopecia: Grade 0 [Pruritus: Grade 0] : Pruritus: Grade 0 [Skin Atrophy: Grade 0] : Skin Atrophy: Grade 0 [Skin Hyperpigmentation: Grade 0] : Skin Hyperpigmentation: Grade 0 [Skin Induration: Grade 0] : Skin Induration: Grade 0 [Dermatitis Radiation: Grade 0] : Dermatitis Radiation: Grade 0 [FreeTextEntry4] : s/p radiation to throat 4/2018

## 2019-09-30 NOTE — HISTORY OF PRESENT ILLNESS
[FreeTextEntry1] : Mr. Thompson Peck is a 63-year-old man with squamous cell carcinoma of glottis, Stage III, T3 N0 M0. He was treated with definitive chemo-radiation, 70 Gy in 35 fractions with concurrent cisplatin q 3 wks, completed 4/27/18.  \par \par He states his taste has returned to normal.  Weight stable. He denies dysphagia, odynophagia hemoptysis, aspiration, dyspnea or cough. Still F/U with med Onc and ENT Dr Mandel. \par \par PET scan in 7/2018 showed interval resolution of hypermetabolism in the region of the right aryepiglottic fold as well as activity and fixed margin of the epiglottis superiorly as compared to PET/CT from 2/23/2018. Mild residual activity along the right false vocal cord, post treatment changes versus residual disease. New nonspecific mild asymmetric hypermetabolism in the left tonsillar region. Stable nonspecific multiple mildly hypermetabolic mediastinal and bilateral perihilar lymph nodes, distal esophagus and partial opacification of right greater than left maxillary sinuses. Resolution of focal hypermetabolism in the region of the right retromolar trigone. \par \par CT scan in 7/2018 IMPRESSION: Interval post treatment sequela with loss of soft tissue planes and smoothing of mucosal surfaces throughout the glottic and paraglottic region with posterior displaced epiglottis. False and true cords adduct each other in the midline limiting assessment. Dental disease with periapical lucency. Retention cysts/polyps in the maxillary antra. \par \par CT chest/neck in 11/2018 showed aortic root dilation and bilateral pulmonary nodules (stable). No evidence of recurrence. \par \par CT scan in 7/2019: ABENA\par \par He followed up with Dr. Mandel in 01/2019 with negative scope. He follows with cardiothoracic surgery for aortic root aneurysm.  He denies any pain, dry mouth, taste changes and is tolerating a normal diet. \par \par \par

## 2019-11-11 ENCOUNTER — FORM ENCOUNTER (OUTPATIENT)
Age: 64
End: 2019-11-11

## 2019-11-12 ENCOUNTER — APPOINTMENT (OUTPATIENT)
Dept: CT IMAGING | Facility: IMAGING CENTER | Age: 64
End: 2019-11-12
Payer: COMMERCIAL

## 2019-11-12 ENCOUNTER — OUTPATIENT (OUTPATIENT)
Dept: OUTPATIENT SERVICES | Facility: HOSPITAL | Age: 64
LOS: 1 days | End: 2019-11-12
Payer: COMMERCIAL

## 2019-11-12 DIAGNOSIS — C32.9 MALIGNANT NEOPLASM OF LARYNX, UNSPECIFIED: ICD-10-CM

## 2019-11-12 DIAGNOSIS — Z98.890 OTHER SPECIFIED POSTPROCEDURAL STATES: Chronic | ICD-10-CM

## 2019-11-12 PROCEDURE — 70491 CT SOFT TISSUE NECK W/DYE: CPT

## 2019-11-12 PROCEDURE — 71260 CT THORAX DX C+: CPT | Mod: 26

## 2019-11-12 PROCEDURE — 71260 CT THORAX DX C+: CPT

## 2019-11-12 PROCEDURE — 70491 CT SOFT TISSUE NECK W/DYE: CPT | Mod: 26

## 2019-11-12 PROCEDURE — 82565 ASSAY OF CREATININE: CPT

## 2019-12-03 ENCOUNTER — APPOINTMENT (OUTPATIENT)
Dept: OTOLARYNGOLOGY | Facility: CLINIC | Age: 64
End: 2019-12-03

## 2019-12-17 ENCOUNTER — APPOINTMENT (OUTPATIENT)
Dept: OTOLARYNGOLOGY | Facility: CLINIC | Age: 64
End: 2019-12-17
Payer: COMMERCIAL

## 2019-12-17 VITALS
DIASTOLIC BLOOD PRESSURE: 84 MMHG | BODY MASS INDEX: 22.35 KG/M2 | SYSTOLIC BLOOD PRESSURE: 121 MMHG | HEIGHT: 72 IN | WEIGHT: 165 LBS | HEART RATE: 69 BPM

## 2019-12-17 PROCEDURE — 31575 DIAGNOSTIC LARYNGOSCOPY: CPT

## 2019-12-17 PROCEDURE — 99214 OFFICE O/P EST MOD 30 MIN: CPT | Mod: 25

## 2019-12-17 RX ORDER — 70%ISOPROPYL ALCOHOL 0.7 ML/ML
70 SWAB TOPICAL
Refills: 0 | Status: COMPLETED | COMMUNITY
End: 2019-12-17

## 2020-01-01 NOTE — CONSULT LETTER
[Dear  ___] : Dear ~KRYS, [Courtesy Letter:] : I had the pleasure of seeing your patient, [unfilled], in my office today. [Please see my note below.] : Please see my note below. [Consult Closing:] : Thank you very much for allowing me to participate in the care of this patient.  If you have any questions, please do not hesitate to contact me. [Sincerely,] : Sincerely, [FreeTextEntry2] : Dr. Oscar Delong\par [address]\[place] [address]  [FreeTextEntry3] : Dev

## 2020-01-01 NOTE — PROCEDURE
[Gag Reflex] : gag reflex preventing mirror examination [None] : none [Flexible Endoscope] : examined with the flexible endoscope [Serial Number: ___] : Serial Number: [unfilled] [de-identified] : No lesions in the NPx, OPx, HPx or larynx.  Stable posttreatment changes, VC are mobile, airway patent.\par

## 2020-01-01 NOTE — HISTORY OF PRESENT ILLNESS
[de-identified] : 64M with SCCa larynx s/p chemo/RT completed 4/2018 presents for follow up.   Last scans Nov 12, 2019.  Pt denies dysphonia, dysphagia, pain, SOB, otalgia.  His weight is stable.\par

## 2020-01-01 NOTE — PHYSICAL EXAM
[de-identified] : Posttreatment changes, no LAD. [Midline] : trachea is located in midline position [Clear / Open well] : hypopharynx is clear and opens well [Normal] : no rashes

## 2020-04-27 VITALS — WEIGHT: 173 LBS | HEIGHT: 72 IN | BODY MASS INDEX: 23.43 KG/M2

## 2020-04-28 ENCOUNTER — APPOINTMENT (OUTPATIENT)
Dept: OTOLARYNGOLOGY | Facility: CLINIC | Age: 65
End: 2020-04-28
Payer: COMMERCIAL

## 2020-04-28 VITALS — DIASTOLIC BLOOD PRESSURE: 85 MMHG | HEART RATE: 77 BPM | SYSTOLIC BLOOD PRESSURE: 122 MMHG

## 2020-04-28 VITALS — TEMPERATURE: 97.4 F

## 2020-04-28 PROCEDURE — 99214 OFFICE O/P EST MOD 30 MIN: CPT | Mod: 25

## 2020-04-28 PROCEDURE — 31575 DIAGNOSTIC LARYNGOSCOPY: CPT

## 2020-04-28 NOTE — PROCEDURE
[None] : none [Flexible Endoscope] : examined with the flexible endoscope [Serial Number: ___] : Serial Number: [unfilled] [de-identified] : No lesions in the NPx, OPx, HPx or larynx.  Stable posttreatment changes, VC are mobile, airway patent.\par  [de-identified] : Larynx SCCa

## 2020-04-28 NOTE — HISTORY OF PRESENT ILLNESS
[de-identified] : 65 M with SCCa larynx s/p chemo/RT completed 4/2018 presents for follow up. No recent imaging.  Pt denies dysphonia, dysphagia, pain, SOB, otalgia.  Weight is stable.  Pt is eating well. \par

## 2020-04-28 NOTE — CONSULT LETTER
[Courtesy Letter:] : I had the pleasure of seeing your patient, [unfilled], in my office today. [Dear  ___] : Dear ~KRYS, [Please see my note below.] : Please see my note below. [FreeTextEntry2] : Dr. Oscar Delong\par [address]\[place] [address]  [Sincerely,] : Sincerely, [Consult Closing:] : Thank you very much for allowing me to participate in the care of this patient.  If you have any questions, please do not hesitate to contact me. [FreeTextEntry3] : Dev

## 2020-04-28 NOTE — PHYSICAL EXAM
[de-identified] : Posttreatment changes, no LAD. [Midline] : trachea is located in midline position [Laryngoscopy Performed] : laryngoscopy was performed, see procedure section for findings [FreeTextEntry1] : No concerning lesions in the OC/OPx on inspection or palpation.\par  [Normal] : no rashes

## 2020-05-07 ENCOUNTER — RESULT REVIEW (OUTPATIENT)
Age: 65
End: 2020-05-07

## 2020-05-07 ENCOUNTER — OUTPATIENT (OUTPATIENT)
Dept: OUTPATIENT SERVICES | Facility: HOSPITAL | Age: 65
LOS: 1 days | End: 2020-05-07
Payer: COMMERCIAL

## 2020-05-07 ENCOUNTER — APPOINTMENT (OUTPATIENT)
Dept: CT IMAGING | Facility: IMAGING CENTER | Age: 65
End: 2020-05-07
Payer: COMMERCIAL

## 2020-05-07 DIAGNOSIS — Z98.890 OTHER SPECIFIED POSTPROCEDURAL STATES: Chronic | ICD-10-CM

## 2020-05-07 DIAGNOSIS — C32.9 MALIGNANT NEOPLASM OF LARYNX, UNSPECIFIED: ICD-10-CM

## 2020-05-07 PROCEDURE — 82565 ASSAY OF CREATININE: CPT

## 2020-05-07 PROCEDURE — 70491 CT SOFT TISSUE NECK W/DYE: CPT | Mod: 26

## 2020-05-07 PROCEDURE — 71260 CT THORAX DX C+: CPT | Mod: 26

## 2020-05-07 PROCEDURE — 71260 CT THORAX DX C+: CPT

## 2020-05-07 PROCEDURE — 70491 CT SOFT TISSUE NECK W/DYE: CPT

## 2020-06-22 ENCOUNTER — APPOINTMENT (OUTPATIENT)
Dept: GASTROENTEROLOGY | Facility: CLINIC | Age: 65
End: 2020-06-22
Payer: COMMERCIAL

## 2020-06-22 VITALS
TEMPERATURE: 98 F | HEART RATE: 88 BPM | BODY MASS INDEX: 23.03 KG/M2 | DIASTOLIC BLOOD PRESSURE: 80 MMHG | WEIGHT: 170 LBS | OXYGEN SATURATION: 96 % | HEIGHT: 72 IN | SYSTOLIC BLOOD PRESSURE: 120 MMHG

## 2020-06-22 PROCEDURE — 99214 OFFICE O/P EST MOD 30 MIN: CPT

## 2020-06-22 NOTE — HISTORY OF PRESENT ILLNESS
[FreeTextEntry1] : Patient is a 65-year-old gentleman who is status post squamous ca of the glottis treated with chemo-radiation.  Course was completed in 4/2018.  He is doing well with no evidence of residual disease.\par \par Patient also has a history of excision of a sigmoid colon polyp with high-grade dysplasia.  His bowel movements are now much better on fiber supplements and Colace.  He denies seeing any blood in the stool.  His weight is stable.

## 2020-06-22 NOTE — ASSESSMENT
[FreeTextEntry1] : Patient with history of squamous CA of the glottis that was treated with chemo/radiation in.  Treatment was finished in 4/2018.\par Patient also has a history of excision of a colon polyp that revealed high-grade dysplasia.  CEA level and FOBT will be ordered.  Patient is due for his colonoscopy next year.\par He will continue fiber supplements and Colace.

## 2020-06-22 NOTE — PHYSICAL EXAM
[General Appearance - Alert] : alert [General Appearance - In No Acute Distress] : in no acute distress [Sclera] : the sclera and conjunctiva were normal [Extraocular Movements] : extraocular movements were intact [PERRL With Normal Accommodation] : pupils were equal in size, round, and reactive to light [Outer Ear] : the ears and nose were normal in appearance [Oropharynx] : the oropharynx was normal [Neck Appearance] : the appearance of the neck was normal [Jugular Venous Distention Increased] : there was no jugular-venous distention [Neck Cervical Mass (___cm)] : no neck mass was observed [Thyroid Diffuse Enlargement] : the thyroid was not enlarged [Thyroid Nodule] : there were no palpable thyroid nodules [Heart Rate And Rhythm] : heart rate was normal and rhythm regular [Auscultation Breath Sounds / Voice Sounds] : lungs were clear to auscultation bilaterally [Heart Sounds] : normal S1 and S2 [Heart Sounds Gallop] : no gallops [Murmurs] : no murmurs [Bowel Sounds] : normal bowel sounds [Heart Sounds Pericardial Friction Rub] : no pericardial rub [Abdomen Soft] : soft [Abdomen Tenderness] : non-tender [] : no hepato-splenomegaly [Abdomen Mass (___ Cm)] : no abdominal mass palpated [No CVA Tenderness] : no ~M costovertebral angle tenderness [No Spinal Tenderness] : no spinal tenderness [Abnormal Walk] : normal gait [Musculoskeletal - Swelling] : no joint swelling seen [Nail Clubbing] : no clubbing  or cyanosis of the fingernails [Oriented To Time, Place, And Person] : oriented to person, place, and time [Motor Tone] : muscle strength and tone were normal [Affect] : the affect was normal [Impaired Insight] : insight and judgment were intact

## 2020-06-26 LAB — CEA SERPL-MCNC: 1.9 NG/ML

## 2020-07-02 LAB — HEMOCCULT STL QL IA: NEGATIVE

## 2020-07-07 ENCOUNTER — APPOINTMENT (OUTPATIENT)
Dept: VASCULAR SURGERY | Facility: CLINIC | Age: 65
End: 2020-07-07

## 2020-07-23 ENCOUNTER — APPOINTMENT (OUTPATIENT)
Dept: VASCULAR SURGERY | Facility: CLINIC | Age: 65
End: 2020-07-23
Payer: COMMERCIAL

## 2020-07-23 VITALS
HEART RATE: 75 BPM | SYSTOLIC BLOOD PRESSURE: 139 MMHG | HEIGHT: 72 IN | DIASTOLIC BLOOD PRESSURE: 87 MMHG | WEIGHT: 170 LBS | BODY MASS INDEX: 23.03 KG/M2 | TEMPERATURE: 97.7 F

## 2020-07-23 PROCEDURE — 99203 OFFICE O/P NEW LOW 30 MIN: CPT

## 2020-07-23 PROCEDURE — 93976 VASCULAR STUDY: CPT

## 2020-07-23 PROCEDURE — 93880 EXTRACRANIAL BILAT STUDY: CPT

## 2020-07-23 NOTE — PHYSICAL EXAM
[Normal Breath Sounds] : Normal breath sounds [Normal Heart Sounds] : normal heart sounds [Respiratory Effort] : normal respiratory effort [Normal Rate and Rhythm] : normal rate and rhythm [Femoral Arteries] : Normal femoral pulses [No Rash or Lesion] : No rash or lesion [2+] : left 2+ [Calm] : calm [JVD] : no jugular venous distention  [Carotid Bruits] : no carotid bruits [Right Carotid Bruit] : no bruit heard over the right carotid [de-identified] : Well-appearing  [Left Carotid Bruit] : no bruit heard over the left carotid [de-identified] : soft, nt, nd, no abd bruit [de-identified] : motor and sensation intact in all four extremities  [de-identified] : EOMI, anicteric  [de-identified] : Supple [de-identified] : A&Ox4

## 2020-07-23 NOTE — ASSESSMENT
[Other: _____] : [unfilled] [FreeTextEntry1] : 65 year old man with history of squamous ca of glottis s/p chemoradiation, here for evaluation of asymptomatic SMA stenosis. \par \par 1. Asymptomatic SMA stenosis\par - No urgent surgical intervention is needed at this time as patient is asymptomatic.\par - Patient educated on symptoms of mesenteric ischemia.\par - Recommend medical management and optimization. Will start aspirin 81mg. Will refer to patient's PMD for starting statin therapy and BP control. \par - Cessation of e-cigarette usage\par - Follow up in 6 months for repeat mesenteric duplex.\par \par 2. Mild carotid stenosis\par - Carotid duplex performed due to questionable of R ICA on CT neck. Duplex reveals <50% stenosis bilaterally.\par - Repeat duplex in 1 year.

## 2020-07-23 NOTE — DATA REVIEWED
[FreeTextEntry1] : 7/23/2020 - Carotid duplex\par BLE < 50% ICA stenosis\par \par 7/23/2020 - Mesenteric duplex \par Technically difficult due to bowel gas.\par Suspected >70% stenosis SMA,

## 2020-07-23 NOTE — HISTORY OF PRESENT ILLNESS
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 No diarrhea or constipation. No history of food fear or recent weight loss. He states that he lost weight when he underwent chemotherapy but since then has regained that weight back. \par He is a former smoker (>1ppd for 35 years) but still smokes 4 ecigarettes per day.\par He has stopped alcohol use at the time of his cancer diagnosis. He does not use any other substances.

## 2020-07-23 NOTE — CONSULT LETTER
[Dear  ___] : Dear  [unfilled], [Consult Letter:] : I had the pleasure of evaluating your patient, [unfilled]. [Please see my note below.] : Please see my note below. [Consult Closing:] : Thank you very much for allowing me to participate in the care of this patient.  If you have any questions, please do not hesitate to contact me. [Sincerely,] : Sincerely, [FreeTextEntry1] : He presented to me for evaluation of SMA stenosis seen on surveillance CT chest. On history, he has no symptoms of mesenteric ischemia and denies abdominal pain, food fear, or weight loss. I performed a mesenteric duplex in my office, which was significant for > 70% stenosis of the SMA. For this, I recommend medical management with aspirin 81mg daily, blood pressure control, and initiation of statin therapy. I also advised patient to stop use of e-cigarettes. I plan to see him in six months for a repeat duplex. \par On the CT neck, I was concerned for mild stenosis of his right ICA and also performed a carotid duplex. This revealed mild stenoses bilaterally. Given his history of radiation to the neck, I will follow this serially with yearly carotid duplex.  [FreeTextEntry3] : Kristina Rowland MD, RPVI\par Division of Vascular & Endovascular Surgery\par [facility], Department of Surgery

## 2020-10-09 ENCOUNTER — APPOINTMENT (OUTPATIENT)
Dept: OTOLARYNGOLOGY | Facility: CLINIC | Age: 65
End: 2020-10-09
Payer: COMMERCIAL

## 2020-10-09 VITALS
DIASTOLIC BLOOD PRESSURE: 72 MMHG | HEIGHT: 72 IN | BODY MASS INDEX: 23.03 KG/M2 | SYSTOLIC BLOOD PRESSURE: 129 MMHG | HEART RATE: 79 BPM | WEIGHT: 170 LBS

## 2020-10-09 PROCEDURE — 99214 OFFICE O/P EST MOD 30 MIN: CPT | Mod: 25

## 2020-10-09 PROCEDURE — 31575 DIAGNOSTIC LARYNGOSCOPY: CPT

## 2020-10-09 NOTE — PROCEDURE
[None] : none [Flexible Endoscope] : examined with the flexible endoscope [Serial Number: ___] : Serial Number: [unfilled] [de-identified] : No lesions in the NPx, OPx, HPx or larynx.  Stable posttreatment changes, VC are mobile, airway patent.\par  [de-identified] : Larynx SCCa

## 2020-10-09 NOTE — REASON FOR VISIT
[Subsequent Evaluation] : a subsequent evaluation for [FreeTextEntry2] : larynx cancer. \par larynx cancer.

## 2020-10-09 NOTE — PHYSICAL EXAM
[Midline] : trachea is located in midline position [Laryngoscopy Performed] : laryngoscopy was performed, see procedure section for findings [Normal] : no rashes [de-identified] : Posttreatment changes, no LAD. [FreeTextEntry1] : No concerning lesions in the OC/OPx on inspection or palpation.\par

## 2020-10-09 NOTE — HISTORY OF PRESENT ILLNESS
[de-identified] : 65 year male with SCCa larynx s/p chemo/RT completed 4/2018 presents for follow up. CT chest/neck 05/07/2020.\par States doing well overall. Pt denies dysphonia, dysphagia, odynophagia, SOB, otalgia. Weight is stable. Pt is eating well.

## 2020-10-13 ENCOUNTER — LABORATORY RESULT (OUTPATIENT)
Age: 65
End: 2020-10-13

## 2020-10-13 ENCOUNTER — APPOINTMENT (OUTPATIENT)
Dept: PULMONOLOGY | Facility: CLINIC | Age: 65
End: 2020-10-13
Payer: COMMERCIAL

## 2020-10-13 ENCOUNTER — NON-APPOINTMENT (OUTPATIENT)
Age: 65
End: 2020-10-13

## 2020-10-13 VITALS — DIASTOLIC BLOOD PRESSURE: 80 MMHG | SYSTOLIC BLOOD PRESSURE: 136 MMHG

## 2020-10-13 VITALS
TEMPERATURE: 97.9 F | HEART RATE: 72 BPM | DIASTOLIC BLOOD PRESSURE: 90 MMHG | SYSTOLIC BLOOD PRESSURE: 159 MMHG | WEIGHT: 175 LBS | HEIGHT: 72 IN | BODY MASS INDEX: 23.7 KG/M2 | OXYGEN SATURATION: 98 %

## 2020-10-13 DIAGNOSIS — Z12.11 ENCOUNTER FOR SCREENING FOR MALIGNANT NEOPLASM OF COLON: ICD-10-CM

## 2020-10-13 DIAGNOSIS — Z11.59 ENCOUNTER FOR SCREENING FOR OTHER VIRAL DISEASES: ICD-10-CM

## 2020-10-13 LAB
ALBUMIN: 10
BILIRUB UR QL STRIP: NEGATIVE
CLARITY UR: CLEAR
COLLECTION METHOD: NORMAL
CREATININE: 10
GLUCOSE UR-MCNC: NEGATIVE
HCG UR QL: 0.2 EU/DL
HGB UR QL STRIP.AUTO: NEGATIVE
KETONES UR-MCNC: NEGATIVE
LEUKOCYTE ESTERASE UR QL STRIP: NEGATIVE
MICROALBUMIN/CREAT UR TEST STR-RTO: NORMAL
NITRITE UR QL STRIP: NEGATIVE
PH UR STRIP: 7.5
PROT UR STRIP-MCNC: NEGATIVE
SP GR UR STRIP: 1.01

## 2020-10-13 PROCEDURE — 81003 URINALYSIS AUTO W/O SCOPE: CPT | Mod: QW

## 2020-10-13 PROCEDURE — 90686 IIV4 VACC NO PRSV 0.5 ML IM: CPT

## 2020-10-13 PROCEDURE — 90732 PPSV23 VACC 2 YRS+ SUBQ/IM: CPT

## 2020-10-13 PROCEDURE — 93000 ELECTROCARDIOGRAM COMPLETE: CPT

## 2020-10-13 PROCEDURE — 99396 PREV VISIT EST AGE 40-64: CPT | Mod: 25

## 2020-10-13 PROCEDURE — 82044 UR ALBUMIN SEMIQUANTITATIVE: CPT | Mod: QW

## 2020-10-13 PROCEDURE — G0009: CPT

## 2020-10-13 PROCEDURE — 90472 IMMUNIZATION ADMIN EACH ADD: CPT

## 2020-10-13 PROCEDURE — 36415 COLL VENOUS BLD VENIPUNCTURE: CPT

## 2020-10-13 NOTE — PHYSICAL EXAM
[No Acute Distress] : no acute distress [Normal Appearance] : normal appearance [Supple] : supple [No JVD] : no jvd [Normal S1, S2] : normal s1, s2 [No Murmurs] : no murmurs [Clear to Auscultation Bilaterally] : clear to auscultation bilaterally [Normal to Percussion] : normal to percussion [No Abnormalities] : no abnormalities [Benign] : benign [Not Tender] : not tender [Soft] : soft [No HSM] : no hsm [No Clubbing] : no clubbing [No Cyanosis] : no cyanosis [No Edema] : no edema [No Focal Deficits] : no focal deficits [Oriented x3] : oriented x3

## 2020-10-14 LAB
25(OH)D3 SERPL-MCNC: 51.7 NG/ML
ALBUMIN SERPL ELPH-MCNC: 4.7 G/DL
ALP BLD-CCNC: 69 U/L
ALT SERPL-CCNC: 20 U/L
ANION GAP SERPL CALC-SCNC: 14 MMOL/L
AST SERPL-CCNC: 27 U/L
BASOPHILS # BLD AUTO: 0.07 K/UL
BASOPHILS NFR BLD AUTO: 1.1 %
BILIRUB DIRECT SERPL-MCNC: 0.1 MG/DL
BILIRUB INDIRECT SERPL-MCNC: 0.4 MG/DL
BILIRUB SERPL-MCNC: 0.6 MG/DL
BUN SERPL-MCNC: 15 MG/DL
CALCIUM SERPL-MCNC: 9.7 MG/DL
CHLORIDE SERPL-SCNC: 100 MMOL/L
CHOLEST SERPL-MCNC: 211 MG/DL
CHOLEST/HDLC SERPL: 4.8 RATIO
CO2 SERPL-SCNC: 22 MMOL/L
CREAT SERPL-MCNC: 0.98 MG/DL
EOSINOPHIL # BLD AUTO: 0.49 K/UL
EOSINOPHIL NFR BLD AUTO: 7.6 %
ESTIMATED AVERAGE GLUCOSE: 114 MG/DL
GLUCOSE SERPL-MCNC: 95 MG/DL
HBA1C MFR BLD HPLC: 5.6 %
HCT VFR BLD CALC: 46.4 %
HCV AB SER QL: NONREACTIVE
HCV S/CO RATIO: 0.11 S/CO
HDLC SERPL-MCNC: 44 MG/DL
HGB BLD-MCNC: 15.8 G/DL
IMM GRANULOCYTES NFR BLD AUTO: 0.3 %
LDLC SERPL CALC-MCNC: 154 MG/DL
LYMPHOCYTES # BLD AUTO: 0.99 K/UL
LYMPHOCYTES NFR BLD AUTO: 15.3 %
MAN DIFF?: NORMAL
MCHC RBC-ENTMCNC: 32.7 PG
MCHC RBC-ENTMCNC: 34.1 GM/DL
MCV RBC AUTO: 96.1 FL
MONOCYTES # BLD AUTO: 0.63 K/UL
MONOCYTES NFR BLD AUTO: 9.8 %
NEUTROPHILS # BLD AUTO: 4.25 K/UL
NEUTROPHILS NFR BLD AUTO: 65.9 %
PLATELET # BLD AUTO: 230 K/UL
POTASSIUM SERPL-SCNC: 5.5 MMOL/L
PROT SERPL-MCNC: 7 G/DL
PSA SERPL-MCNC: 5.74 NG/ML
RBC # BLD: 4.83 M/UL
RBC # FLD: 12 %
SODIUM SERPL-SCNC: 136 MMOL/L
T3 SERPL-MCNC: 89 NG/DL
T3RU NFR SERPL: 1 TBI
T4 FREE SERPL-MCNC: 1.1 NG/DL
T4 SERPL-MCNC: 6.1 UG/DL
TRIGL SERPL-MCNC: 64 MG/DL
TSH SERPL-ACNC: 4.15 UIU/ML
WBC # FLD AUTO: 6.45 K/UL

## 2020-11-02 ENCOUNTER — APPOINTMENT (OUTPATIENT)
Dept: UROLOGY | Facility: CLINIC | Age: 65
End: 2020-11-02
Payer: COMMERCIAL

## 2020-11-02 VITALS — TEMPERATURE: 97.3 F

## 2020-11-02 VITALS — DIASTOLIC BLOOD PRESSURE: 100 MMHG | SYSTOLIC BLOOD PRESSURE: 159 MMHG | HEART RATE: 85 BPM

## 2020-11-02 DIAGNOSIS — R68.2 DRY MOUTH, UNSPECIFIED: ICD-10-CM

## 2020-11-02 DIAGNOSIS — N52.9 MALE ERECTILE DYSFUNCTION, UNSPECIFIED: ICD-10-CM

## 2020-11-02 DIAGNOSIS — K63.5 POLYP OF COLON: ICD-10-CM

## 2020-11-02 DIAGNOSIS — K59.00 CONSTIPATION, UNSPECIFIED: ICD-10-CM

## 2020-11-02 DIAGNOSIS — R63.0 ANOREXIA: ICD-10-CM

## 2020-11-02 PROCEDURE — 99072 ADDL SUPL MATRL&STAF TM PHE: CPT

## 2020-11-02 PROCEDURE — 99204 OFFICE O/P NEW MOD 45 MIN: CPT

## 2020-11-02 NOTE — ADDENDUM
[FreeTextEntry1] : Entered by Iggy Bella, acting as scribe for Dr. Octavio Crooks.\par \par The documentation recorded by the scribe accurately reflects the service I personally performed and the decisions made by me.\par

## 2020-11-02 NOTE — LETTER BODY
[FreeTextEntry1] : Aramis Bradford MD\par [address]\par [address]\[place] [address]\par [phone]\par \par Dear Dr. Bradford,\par \par Reason for Visit: Elevated PSA. Erectile dysfunction.\par \par This is a 65 year-old retired gentleman with history of aneurysm of aortic root, anorexia, colonic polyp, constipation, and COPD, presenting with elevated PSA. Patient reports that he has not had previous prostate biopsy. His current PSA is 5.74. Patient denies any hematuria or lower urinary tract symptoms. He denies any pain.The patient denies any aggravating or relieving factors. He also reports worsening erectile dysfunction for the past several years. Patient reports normal libido and sex drive. However he notes decreased penile tumescence. The patient denies any cardiac history. All other review of systems are negative. He has no cancer in his family medical history. He has no previous surgical history. Past medical history, family history and social history were inquired and were noncontributory to current condition. The patient does not use tobacco or drink alcohol. Medications and allergies were reviewed. He has no known allergies to medication. \par \par On examination, the patient is a healthy-appearing gentleman in no acute distress. He is alert and oriented follows commands. He has normal mood and affect. He is normocephalic. Neck is supple. Oral no thrush. Respirations are unlabored. His abdomen is soft and nontender. Bladder is nonpalpable. No CVA tenderness. Neurologically he is grossly intact. No peripheral edema. Skin without gross abnormality. He has normal male external genitalia. Normal meatus. Bilateral testes are descended intrascrotally and normal to palpation. On rectal examination, there is normal sphincter tone. The prostate is clinically benign without focal induration or nodularity.\par \par Assessment: Elevated PSA. Erectile dysfunction.\par \par I counseled the patient. In terms of his elevated PSA, I discussed with him the risk of occult malignancy. I recommended the patient obtain a prostate biopsy for further evaluation.  I counseled the patient regarding the procedure. The risks and benefits were discussed. Alternatives were given. I answered the patient questions. The patient will take the necessary preparations for the procedure, including a course of Cefdinir and fleet enema. I also discussed the various etiologies of erectile dysfunction. I recommended that he obtained a male hormone panel with testosterone, free testosterone, prolactin, and LH level. I also recommended he begin a trial of Sildenafil citrate PRN. I discussed the potential side effects of the medication. I counseled the patient on its use and side effects. If the patient develops any side effects, the patient will discontinue the medication and contact me. The patient will obtain urinalysis and BMP today. Risks and alternatives were discussed. I answered the patient questions. The patient will follow-up as directed and will contact me with any questions or concerns. Thank you for the opportunity to participate in the care of Mr. KONG. I will keep you updated on his progress.\par \par Plan: Prostate biopsy. Course of Cefdinir. Fleet enema. Male hormone panel. Trial of Sildenafil PRN. Urinalysis. BMP. Follow-up as directed.

## 2020-11-05 LAB
ANION GAP SERPL CALC-SCNC: 10 MMOL/L
APPEARANCE: CLEAR
BACTERIA: NEGATIVE
BILIRUBIN URINE: NEGATIVE
BLOOD URINE: NEGATIVE
BUN SERPL-MCNC: 16 MG/DL
CALCIUM SERPL-MCNC: 9.6 MG/DL
CHLORIDE SERPL-SCNC: 100 MMOL/L
CO2 SERPL-SCNC: 29 MMOL/L
COLOR: NORMAL
CREAT SERPL-MCNC: 1.09 MG/DL
ESTRADIOL SERPL-MCNC: 23 PG/ML
GLUCOSE QUALITATIVE U: NEGATIVE
GLUCOSE SERPL-MCNC: 103 MG/DL
HYALINE CASTS: 0 /LPF
KETONES URINE: NEGATIVE
LEUKOCYTE ESTERASE URINE: NEGATIVE
LH SERPL-ACNC: 4.7 IU/L
MICROSCOPIC-UA: NORMAL
NITRITE URINE: NEGATIVE
PH URINE: 6.5
POTASSIUM SERPL-SCNC: 4 MMOL/L
PROLACTIN SERPL-MCNC: 5.8 NG/ML
PROTEIN URINE: NEGATIVE
RED BLOOD CELLS URINE: 2 /HPF
SODIUM SERPL-SCNC: 139 MMOL/L
SPECIFIC GRAVITY URINE: 1.01
SQUAMOUS EPITHELIAL CELLS: 0 /HPF
UROBILINOGEN URINE: NORMAL
WHITE BLOOD CELLS URINE: 0 /HPF

## 2020-11-06 ENCOUNTER — APPOINTMENT (OUTPATIENT)
Dept: CT IMAGING | Facility: IMAGING CENTER | Age: 65
End: 2020-11-06
Payer: COMMERCIAL

## 2020-11-06 ENCOUNTER — OUTPATIENT (OUTPATIENT)
Dept: OUTPATIENT SERVICES | Facility: HOSPITAL | Age: 65
LOS: 1 days | End: 2020-11-06
Payer: COMMERCIAL

## 2020-11-06 DIAGNOSIS — C32.9 MALIGNANT NEOPLASM OF LARYNX, UNSPECIFIED: ICD-10-CM

## 2020-11-06 DIAGNOSIS — Z98.890 OTHER SPECIFIED POSTPROCEDURAL STATES: Chronic | ICD-10-CM

## 2020-11-06 DIAGNOSIS — Z00.8 ENCOUNTER FOR OTHER GENERAL EXAMINATION: ICD-10-CM

## 2020-11-06 PROCEDURE — 70491 CT SOFT TISSUE NECK W/DYE: CPT

## 2020-11-06 PROCEDURE — 71260 CT THORAX DX C+: CPT | Mod: 26

## 2020-11-06 PROCEDURE — 70491 CT SOFT TISSUE NECK W/DYE: CPT | Mod: 26

## 2020-11-06 PROCEDURE — 71260 CT THORAX DX C+: CPT

## 2020-11-10 LAB
TESTOST BND SERPL-MCNC: 7.5 PG/ML
TESTOST SERPL-MCNC: 542.5 NG/DL

## 2020-11-16 ENCOUNTER — OUTPATIENT (OUTPATIENT)
Dept: OUTPATIENT SERVICES | Facility: HOSPITAL | Age: 65
LOS: 1 days | End: 2020-11-16
Payer: COMMERCIAL

## 2020-11-16 ENCOUNTER — APPOINTMENT (OUTPATIENT)
Dept: UROLOGY | Facility: CLINIC | Age: 65
End: 2020-11-16
Payer: COMMERCIAL

## 2020-11-16 VITALS — SYSTOLIC BLOOD PRESSURE: 158 MMHG | DIASTOLIC BLOOD PRESSURE: 92 MMHG | HEART RATE: 53 BPM

## 2020-11-16 VITALS — TEMPERATURE: 97.6 F

## 2020-11-16 DIAGNOSIS — Z98.890 OTHER SPECIFIED POSTPROCEDURAL STATES: Chronic | ICD-10-CM

## 2020-11-16 DIAGNOSIS — N13.8 BENIGN PROSTATIC HYPERPLASIA WITH LOWER URINARY TRACT SYMPMS: ICD-10-CM

## 2020-11-16 DIAGNOSIS — N40.1 BENIGN PROSTATIC HYPERPLASIA WITH LOWER URINARY TRACT SYMPMS: ICD-10-CM

## 2020-11-16 DIAGNOSIS — R35.0 FREQUENCY OF MICTURITION: ICD-10-CM

## 2020-11-16 PROCEDURE — 76872 US TRANSRECTAL: CPT | Mod: 26

## 2020-11-16 PROCEDURE — 55700: CPT

## 2020-11-16 PROCEDURE — 76942 ECHO GUIDE FOR BIOPSY: CPT | Mod: 59

## 2020-11-16 PROCEDURE — 76942 ECHO GUIDE FOR BIOPSY: CPT | Mod: 26,59

## 2020-11-16 PROCEDURE — 76872 US TRANSRECTAL: CPT

## 2020-11-17 ENCOUNTER — NON-APPOINTMENT (OUTPATIENT)
Age: 65
End: 2020-11-17

## 2020-11-20 LAB — CORE LAB BIOPSY: NORMAL

## 2020-11-27 DIAGNOSIS — N40.1 BENIGN PROSTATIC HYPERPLASIA WITH LOWER URINARY TRACT SYMPTOMS: ICD-10-CM

## 2020-11-27 DIAGNOSIS — R97.20 ELEVATED PROSTATE SPECIFIC ANTIGEN [PSA]: ICD-10-CM

## 2020-12-03 ENCOUNTER — APPOINTMENT (OUTPATIENT)
Dept: UROLOGY | Facility: CLINIC | Age: 65
End: 2020-12-03
Payer: COMMERCIAL

## 2020-12-03 VITALS — TEMPERATURE: 97.9 F

## 2020-12-03 PROCEDURE — 99072 ADDL SUPL MATRL&STAF TM PHE: CPT

## 2020-12-03 PROCEDURE — 99214 OFFICE O/P EST MOD 30 MIN: CPT

## 2020-12-03 NOTE — LETTER BODY
[FreeTextEntry1] : Aramis Bradford MD\par [address]\par [address]\[place] [address]\par [phone]\par \par Dear Dr. Bradford,\par \par Reason for visit: Prostate cancer. \par \par This is a 65 year-old gentleman with elevated PSA, status post prostate biopsy. Patient returns today to discuss the results of the biopsy. He is accompanied by his wife. Patient denies any side effects of difficulty of the prostate biopsy. He has some mild hematuria which is improving. He denies any fevers or chills. He denies any pain.Patient denies any changes in health. The past medical history and family history and social history are unchanged. All other review of systems are negative. Patient denies any changes in medications. Medication list was reconciled.\par \par On examination, the patient is a healthy-appearing gentleman in no acute distress. He is alert and oriented follows commands. He has normal mood and affect. He is normocephalic. Neck is supple. Oral no thrush Respirations are unlabored. His abdomen is soft and nontender. Bladder is nonpalpable. No CVA tenderness. Neurologically he is grossly intact. No peripheral edema. Skin without gross abnormality. He has normal male external genitalia. Normal meatus. Bilateral testes are descended intrascrotally and normal to palpation. On rectal examination, there is normal sphincter tone. The prostate is clinically benign without focal induration or nodularity.\par \par Prostate biopsy demonstrated evidence of Linda 6 adenocarcinoma of the prostate involving 2 of 12 cores, and Linda 7 adenocarcinoma of the prostate involving 1 core.\par \par Assessment: Prostate cancer.\par \par I counseled the patient on its clinical significance. I discussed the risk of metastatic disease and the probability of organ confined disease. I discussed the treatment options available to him including expectant management, hormonal therapy, radiation, and surgery. The risks and benefits of each options were discussed in detail as well quality of life issues. I answered his questions. He wishes to consider his options. Risks and benefits were discussed. Alternatives were given. I answered the patient questions. The patient wishes to consider the procedure and discuss with his family. The patient will follow-up as directed and will contact me with any questions or concerns or he has made a decision regarding the treatment of his prostate cancer.\par \par Plan: Consider options. Follow-up in 1 month.

## 2020-12-16 ENCOUNTER — OUTPATIENT (OUTPATIENT)
Dept: OUTPATIENT SERVICES | Facility: HOSPITAL | Age: 65
LOS: 1 days | End: 2020-12-16
Payer: COMMERCIAL

## 2020-12-16 ENCOUNTER — APPOINTMENT (OUTPATIENT)
Dept: CT IMAGING | Facility: IMAGING CENTER | Age: 65
End: 2020-12-16
Payer: COMMERCIAL

## 2020-12-16 ENCOUNTER — RESULT REVIEW (OUTPATIENT)
Age: 65
End: 2020-12-16

## 2020-12-16 ENCOUNTER — APPOINTMENT (OUTPATIENT)
Dept: NUCLEAR MEDICINE | Facility: IMAGING CENTER | Age: 65
End: 2020-12-16
Payer: COMMERCIAL

## 2020-12-16 DIAGNOSIS — R97.20 ELEVATED PROSTATE SPECIFIC ANTIGEN [PSA]: ICD-10-CM

## 2020-12-16 DIAGNOSIS — I71.9 AORTIC ANEURYSM OF UNSPECIFIED SITE, WITHOUT RUPTURE: ICD-10-CM

## 2020-12-16 DIAGNOSIS — Z98.890 OTHER SPECIFIED POSTPROCEDURAL STATES: Chronic | ICD-10-CM

## 2020-12-16 DIAGNOSIS — J44.9 CHRONIC OBSTRUCTIVE PULMONARY DISEASE, UNSPECIFIED: ICD-10-CM

## 2020-12-16 PROCEDURE — 72193 CT PELVIS W/DYE: CPT

## 2020-12-16 PROCEDURE — 78306 BONE IMAGING WHOLE BODY: CPT

## 2020-12-16 PROCEDURE — 72193 CT PELVIS W/DYE: CPT | Mod: 26

## 2020-12-16 PROCEDURE — 82565 ASSAY OF CREATININE: CPT

## 2020-12-16 PROCEDURE — 78306 BONE IMAGING WHOLE BODY: CPT | Mod: 26

## 2020-12-16 PROCEDURE — 78830 RP LOCLZJ TUM SPECT W/CT 1: CPT

## 2020-12-16 PROCEDURE — 78830 RP LOCLZJ TUM SPECT W/CT 1: CPT | Mod: 26

## 2020-12-16 PROCEDURE — A9561: CPT

## 2020-12-23 PROBLEM — Z12.11 ENCOUNTER FOR SCREENING COLONOSCOPY: Status: RESOLVED | Noted: 2017-01-11 | Resolved: 2020-12-23

## 2021-01-28 ENCOUNTER — APPOINTMENT (OUTPATIENT)
Dept: VASCULAR SURGERY | Facility: CLINIC | Age: 66
End: 2021-01-28
Payer: COMMERCIAL

## 2021-01-28 VITALS
SYSTOLIC BLOOD PRESSURE: 133 MMHG | HEART RATE: 80 BPM | HEIGHT: 72 IN | WEIGHT: 170 LBS | TEMPERATURE: 97.8 F | BODY MASS INDEX: 23.03 KG/M2 | DIASTOLIC BLOOD PRESSURE: 90 MMHG

## 2021-01-28 DIAGNOSIS — I77.1 STRICTURE OF ARTERY: ICD-10-CM

## 2021-01-28 PROCEDURE — 93976 VASCULAR STUDY: CPT

## 2021-01-28 PROCEDURE — 99072 ADDL SUPL MATRL&STAF TM PHE: CPT

## 2021-01-28 PROCEDURE — 99212 OFFICE O/P EST SF 10 MIN: CPT

## 2021-01-28 PROCEDURE — XXXXX: CPT

## 2021-01-28 NOTE — DATA REVIEWED
[FreeTextEntry1] : 1/28/21- Mesenteric Duplex \par Technically difficult due to bowel gas\par Evidence of stenosis of SMA 70-99%\par \par ----------------------------------------------\par 7/23/2020 - Carotid duplex\par BLE < 50% ICA stenosis\par \par 7/23/2020 - Mesenteric duplex \par Technically difficult due to bowel gas.\par Suspected >70% stenosis SMA,

## 2021-01-28 NOTE — PHYSICAL EXAM
[JVD] : no jugular venous distention  [Carotid Bruits] : no carotid bruits [Right Carotid Bruit] : no bruit heard over the right carotid [Left Carotid Bruit] : no bruit heard over the left carotid [de-identified] : EOMI, anicteric  [de-identified] : Well-appearing  [de-identified] : Supple [de-identified] : soft, nt, nd, no abd bruit [de-identified] : motor and sensation intact in all four extremities  [de-identified] : A&Ox4

## 2021-01-28 NOTE — HISTORY OF PRESENT ILLNESS
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 No diarrhea or constipation. No history of food fear or recent weight loss. He states that he lost weight when he underwent chemotherapy but since then has regained that weight back. \par He is a former smoker (>1ppd for 35 years) but still smokes 4 ecigarettes per day.\par He has stopped alcohol use at the time of his cancer diagnosis. He does not use any other substances.  [de-identified] : 1/28/21 pt presents today for follow up regarding SMA stenosis. He remains asymptomatic and denies any new complaints or changes. No recent weight loss or abdominal pain. He is still smoking e-cigarettes. He has not been taking ASA 81mg.

## 2021-01-28 NOTE — ASSESSMENT
[FreeTextEntry1] : 65 year old man with history of squamous ca of glottis s/p chemoradiation, here for evaluation of asymptomatic SMA stenosis. \par \par 1. Asymptomatic SMA stenosis\par - No urgent surgical intervention is needed at this time as patient is asymptomatic.\par - Patient educated on symptoms of mesenteric ischemia.\par - Recommend medical management and optimization. Again, pt to start aspirin 81mg. Will refer to patient's PMD for starting statin therapy and BP control. \par - Cessation of e-cigarette usage\par - Follow up in 6 months for repeat mesenteric duplex.\par \par 2. Mild carotid stenosis\par - Carotid duplex performed due to previous questionable R ICA on CT neck. Duplex reveals <50% stenosis bilaterally.\par - Repeat duplex in 6 months

## 2021-02-18 NOTE — HISTORY OF PRESENT ILLNESS
[Current] : current [TextBox_11] : 1 [TextBox_13] : 35 [YearQuit] : 2018 [TextBox_4] : This visit was provided via telehealth using real-time 2-way audio visual technology. The patient, ZOË KONG , was located at home, [address]\[place] [address]  at the time of the visit.\par The provider, Aramis Bradford, was located at office [address] [place], [place] at the time of the visit. \par \par  The patient, Mr. ZOË KONG  and Physician Aramis Morales participated in the telehealth encounter.\par \par Verbal consent obtained by  from patient\par \par Became ill around 2/7\par Swab 2/12\par Feeling poorly. Chills, weak, poor apetitte.\par Is drinking\par Low grade temps.\par Minimal SOB. Moderate cough now decreased.\par Has pulse oximeter sat 94%\par Some diarrhea now improved.\par Metallic taste in mouth\par

## 2021-02-18 NOTE — HISTORY OF PRESENT ILLNESS
[Current] : current [TextBox_11] : 1 [TextBox_13] : 35 [YearQuit] : 2018 [TextBox_4] : This visit was provided via telehealth using real-time 2-way audio visual technology. The patient, ZOË KONG , was located at home, [address]\[place] [address]  at the time of the visit.\par The provider, Aramis Bradford, was located at office [address] [place], [place] at the time of the visit. \par \par  The patient, Mr. ZOË KONG  and Physician Aramis Morales participated in the telehealth encounter.\par \par Verbal consent obtained by  from patient\par \par Became ill around 2/7\par Swab 2/12\par Feeling poorly. Chills, weak, poor apetitte.\par Is drinking\par Low grade temps.\par Minimal SOB. Moderate cough now decreased.\par Has pulse oximeter sat 94%\par Some diarrhea now improved.\par Metallic taste in mouth\par

## 2021-02-18 NOTE — ASSESSMENT
[FreeTextEntry1] : Cannot take ASA\par Vit D taking.\par Monoclonal antibody.\par For labs.\par Fluids\par Tylenol for fever.\par Call if decrease sats.\par \par

## 2021-04-19 ENCOUNTER — APPOINTMENT (OUTPATIENT)
Dept: OTOLARYNGOLOGY | Facility: CLINIC | Age: 66
End: 2021-04-19
Payer: COMMERCIAL

## 2021-04-19 VITALS
SYSTOLIC BLOOD PRESSURE: 166 MMHG | BODY MASS INDEX: 23.43 KG/M2 | HEIGHT: 72 IN | HEART RATE: 85 BPM | DIASTOLIC BLOOD PRESSURE: 92 MMHG | WEIGHT: 173 LBS

## 2021-04-19 PROCEDURE — 31575 DIAGNOSTIC LARYNGOSCOPY: CPT

## 2021-04-19 PROCEDURE — 99072 ADDL SUPL MATRL&STAF TM PHE: CPT

## 2021-04-19 PROCEDURE — 99214 OFFICE O/P EST MOD 30 MIN: CPT | Mod: 25

## 2021-04-19 NOTE — PHYSICAL EXAM
[Midline] : trachea is located in midline position [Laryngoscopy Performed] : laryngoscopy was performed, see procedure section for findings [Normal] : no rashes [de-identified] : Posttreatment changes, no LAD. [FreeTextEntry1] : No concerning lesions in the OC/OPx on inspection or palpation.\par

## 2021-04-19 NOTE — HISTORY OF PRESENT ILLNESS
[de-identified] : 66 year male with SCCa larynx s/p chemo/RT completed 4/2018 presents for follow up. CT chest/neck 12/2020- [facility].\par States doing well overall. Pt denies dysphonia, dysphagia, odynophagia, SOB, otalgia. Weight is stable. Pt is eating well.

## 2021-04-19 NOTE — PROCEDURE
[None] : none [Flexible Endoscope] : examined with the flexible endoscope [Serial Number: ___] : Serial Number: [unfilled] [de-identified] : No lesions in the NPx, OPx, HPx or larynx.  Stable posttreatment changes, VC are mobile, airway patent.\par  [de-identified] : Larynx SCCa

## 2021-04-20 ENCOUNTER — APPOINTMENT (OUTPATIENT)
Dept: PULMONOLOGY | Facility: CLINIC | Age: 66
End: 2021-04-20
Payer: COMMERCIAL

## 2021-04-20 VITALS — HEART RATE: 96 BPM | TEMPERATURE: 97.7 F | OXYGEN SATURATION: 98 %

## 2021-04-20 VITALS — SYSTOLIC BLOOD PRESSURE: 138 MMHG | DIASTOLIC BLOOD PRESSURE: 85 MMHG

## 2021-04-20 DIAGNOSIS — U07.1 COVID-19: ICD-10-CM

## 2021-04-20 PROCEDURE — 99214 OFFICE O/P EST MOD 30 MIN: CPT | Mod: 25

## 2021-04-20 PROCEDURE — 99072 ADDL SUPL MATRL&STAF TM PHE: CPT

## 2021-04-20 PROCEDURE — 36415 COLL VENOUS BLD VENIPUNCTURE: CPT

## 2021-04-20 NOTE — PHYSICAL EXAM
[No Acute Distress] : no acute distress [Supple] : supple [No JVD] : no jvd [Normal S1, S2] : normal s1, s2 [No Murmurs] : no murmurs [Clear to Auscultation Bilaterally] : clear to auscultation bilaterally [Normal to Percussion] : normal to percussion [Benign] : benign [No HSM] : no hsm [No Clubbing] : no clubbing [No Cyanosis] : no cyanosis [No Edema] : no edema

## 2021-04-20 NOTE — ASSESSMENT
[FreeTextEntry1] : Discussed options for treatment of prostate cancer.  To decide on radiation versus surgery.\par Vascular note appreciated and reviewed.\par Medications reviewed and renewed.\par Labs drawn in office today\par \par \par

## 2021-04-20 NOTE — DISCUSSION/SUMMARY
[FreeTextEntry1] : Prostate cancer.\par Mild COPD.\par Asymptomatic SMA stenosis.\par Mild carotid disease.\par Hyperlipidemia.\par History of laryngeal CA.\par Status post Covid virus infection without sequela.\par

## 2021-04-20 NOTE — HISTORY OF PRESENT ILLNESS
[TextBox_4] : Feeling well\par No residual symptoms from COVID\par \par No significant cough, wheezing, chest pain or SOB.\par \par Recently dx. with prostate cancer. \par \par Got first vaccine\par

## 2021-04-21 LAB
ALBUMIN SERPL ELPH-MCNC: 4.6 G/DL
ALP BLD-CCNC: 68 U/L
ALT SERPL-CCNC: 25 U/L
AST SERPL-CCNC: 33 U/L
BILIRUB DIRECT SERPL-MCNC: 0.2 MG/DL
BILIRUB INDIRECT SERPL-MCNC: 0.4 MG/DL
BILIRUB SERPL-MCNC: 0.6 MG/DL
CHOLEST SERPL-MCNC: 163 MG/DL
HDLC SERPL-MCNC: 44 MG/DL
LDLC SERPL CALC-MCNC: 106 MG/DL
NONHDLC SERPL-MCNC: 119 MG/DL
PROT SERPL-MCNC: 7 G/DL
TRIGL SERPL-MCNC: 67 MG/DL

## 2021-05-12 NOTE — PROCEDURE
57 yr old  man , non smoker with  moody 1990s presented 3/14 with x9 days worsening cough, subjective fevers, and SOB, with x2-3 days dysuria and central, non-radiating, constant CP. Admitted to medicine unit  for acute hypoxic respiratory failure secondary to pna from covid-19 infection .     Assessment:  1. Acute hypoxic respiratory failure  2. Covid-19 infection   3. Transaminitis  4. Prediabetes  5. Bilateral pneumothorax  6. Septic shock - resolved  7. Anemia    Plan     - persistent left apical PTX  -S/p tracheostomy placement 4/21   -S/p G-tube 4/23  -Continue tube feedings  -Cont. mechanical ventilation   -Daily weaning trials  -Chest tube to suction  - tapering steroids  - wean sedation  -PT evaluation  -dvt/gi prophy  -hemodynamic monitoring   -Prognosis is guarded . [Topical Lidocaine] : topical lidocaine [Oxymetazoline HCl] : oxymetazoline HCl [Flexible Endoscope] : examined with the flexible endoscope [Serial Number: ___] : Serial Number: [unfilled] [de-identified] : No lesions in the NPx, OPx, HPx or larynx.  Stable posttreatment changes, VC are mobile, airway patent.\par  [de-identified] : Larynx SCCa

## 2021-05-20 ENCOUNTER — APPOINTMENT (OUTPATIENT)
Dept: UROLOGY | Facility: CLINIC | Age: 66
End: 2021-05-20
Payer: COMMERCIAL

## 2021-05-20 PROCEDURE — 99072 ADDL SUPL MATRL&STAF TM PHE: CPT

## 2021-05-20 PROCEDURE — 99214 OFFICE O/P EST MOD 30 MIN: CPT

## 2021-05-20 NOTE — ADDENDUM
[FreeTextEntry1] : Entered by Iggy Bella, acting as scribe for Dr. Octavio Crooks.\par \par The documentation recorded by the scribe accurately reflects the service I personally performed and the decisions made by me.\par \par

## 2021-05-20 NOTE — LETTER BODY
[FreeTextEntry1] : Aramis Bradford MD\par [address]\[place] [address]\par [phone]\par \par Dear Dr. Bradford,\par \par Reason for visit: Prostate cancer. \par \par This is a 66 year-old gentleman with prostate cancer. His prostate biopsy in Nov 2020 demonstrated Linda 6/7 adenocarcinoma of the prostate involving 3 of 12 cores.The patient returns today for follow-up. Since his last visit, the patient wishes to proceed with treatment of prostate cancer. He is interested in surgery. He denies any pain or hematuria or urinary incontinence. Patient denies any changes in health. The past medical history and family history and social history are unchanged. All other review of systems are negative. Patient denies any changes in medications. Medication list was reconciled.\par \par On examination, the patient is a healthy-appearing gentleman in no acute distress. He is alert and oriented follows commands. He has normal mood and affect. He is normocephalic. Neck is supple. Oral no thrush Respirations are unlabored. His abdomen is soft and nontender. Bladder is nonpalpable. No CVA tenderness. Neurologically he is grossly intact. No peripheral edema. Skin without gross abnormality. He has normal male external genitalia. Normal meatus. Bilateral testes are descended intrascrotally and normal to palpation. On rectal examination, there is normal sphincter tone. The prostate is clinically benign without focal induration or nodularity.\par \par Assessment: Prostate cancer.\par \par I counseled the patient. in terms of his prostate cancer, the patient wishes to proceed with surgery. I recommended the patient see Dr. Scott for robotic prostatectomy. I counseled the patient regarding the procedure. The risks and benefits were discussed. Alternatives were given. I answered the patient questions. The patient will take the necessary preparations for the procedure. The patient will repeat BMP and PSA today to ensure stability. Risks and alternatives were discussed. I answered the patient questions. The patient will follow-up as directed and will contact me with any questions or concerns. Thank you for the opportunity to participate in the care of Mr. KONG. I will keep you updated on his progress.\par \par Plan: BMP. PSA. See Dr. Scott.

## 2021-05-21 LAB
ANION GAP SERPL CALC-SCNC: 13 MMOL/L
BUN SERPL-MCNC: 23 MG/DL
CALCIUM SERPL-MCNC: 9.5 MG/DL
CHLORIDE SERPL-SCNC: 103 MMOL/L
CO2 SERPL-SCNC: 22 MMOL/L
CREAT SERPL-MCNC: 1.08 MG/DL
GLUCOSE SERPL-MCNC: 75 MG/DL
POTASSIUM SERPL-SCNC: 4.5 MMOL/L
PSA FREE FLD-MCNC: 13 %
PSA FREE SERPL-MCNC: 0.81 NG/ML
PSA SERPL-MCNC: 6.45 NG/ML
SODIUM SERPL-SCNC: 138 MMOL/L

## 2021-05-27 ENCOUNTER — APPOINTMENT (OUTPATIENT)
Dept: UROLOGY | Facility: CLINIC | Age: 66
End: 2021-05-27
Payer: COMMERCIAL

## 2021-05-27 VITALS
TEMPERATURE: 98 F | DIASTOLIC BLOOD PRESSURE: 76 MMHG | RESPIRATION RATE: 17 BRPM | HEIGHT: 72 IN | SYSTOLIC BLOOD PRESSURE: 126 MMHG | BODY MASS INDEX: 23.43 KG/M2 | HEART RATE: 83 BPM | WEIGHT: 173 LBS

## 2021-05-27 DIAGNOSIS — Z09 ENCOUNTER FOR FOLLOW-UP EXAMINATION AFTER COMPLETED TREATMENT FOR CONDITIONS OTHER THAN MALIGNANT NEOPLASM: ICD-10-CM

## 2021-05-27 DIAGNOSIS — R97.20 ELEVATED PROSTATE, SPECIFIC ANTIGEN [PSA]: ICD-10-CM

## 2021-05-27 PROCEDURE — 99072 ADDL SUPL MATRL&STAF TM PHE: CPT

## 2021-05-27 PROCEDURE — 99215 OFFICE O/P EST HI 40 MIN: CPT

## 2021-05-27 RX ORDER — CEFDINIR 300 MG/1
300 CAPSULE ORAL
Qty: 6 | Refills: 0 | Status: COMPLETED | COMMUNITY
Start: 2020-11-02 | End: 2021-05-27

## 2021-05-27 RX ORDER — ENEMA 19; 7 G/133ML; G/133ML
7-19 ENEMA RECTAL
Qty: 1 | Refills: 0 | Status: COMPLETED | COMMUNITY
Start: 2020-11-02 | End: 2021-05-27

## 2021-05-27 NOTE — VITALS
[Maximal Pain Intensity: 0/10] : 0/10 [Least Pain Intensity: 0/10] : 0/10 [100: Normal, no complaints, no evidence of disease.] : 100: Normal, no complaints, no evidence of disease. [100: Fully active, normal.] : 100: Fully active, normal. [ECOG Performance Status: 0 - Fully active, able to carry on all pre-disease performance without restriction] : Performance Status: 0 - Fully active, able to carry on all pre-disease performance without restriction [0 - No Distress] : Distress Level: 0

## 2021-05-27 NOTE — VITALS
[Maximal Pain Intensity: 0/10] : 0/10 [Least Pain Intensity: 0/10] : 0/10 [100: Normal, no complaints, no evidence of disease.] : 100: Normal, no complaints, no evidence of disease. [100: Fully active, normal.] : 100: Fully active, normal. [ECOG Performance Status: 0 - Fully active, able to carry on all pre-disease performance without restriction] : Performance Status: 0 - Fully active, able to carry on all pre-disease performance without restriction [0 - No Distress] : Distress Level: 0 yes

## 2021-05-30 NOTE — HISTORY OF PRESENT ILLNESS
[FreeTextEntry1] : Here for discussion regarding treatment of prostate cancer.\par Biopsy was done in November 2020.\par Unfavorable intermediate risk prostate cancer.\par \par Reports urinary stream is good. Denies any increased urgency or frequency. Feels that he empties his bladder. \par Denies any issues with erections, takes sildenafil occasionally. \par \par No family history of prostate cancer.\par \par History of squamous cell ca of glottis s/p chemoradiation 2.5 years ago, now in remission.\par \par Has prior hx of high grade dysplasia with rectal polyp, last colonoscopy was 8/23/2018. Due for surveillance colonoscopy.

## 2021-05-30 NOTE — DISEASE MANAGEMENT
[1] : T1 [c] : c [0-10] : 0 -10 ng/mL [7(4+3)] : Linda Score 7(4+3) [0] : N0 [X] : MX [IIC] : IIC [] : Patient had no Bone Scan performed [BiopsyDate] : 11/10/2020 [TotalCores] : 12 [TotalPositiveCores] : 3 [CTresults] : no pelvic lymphadenopathy  December 2020  [MaxCoreInvolvement] : 5 [FreeTextEntry7] : 12 core biopsy demonstrated 2 cores Linda 6(3+3), [place] 7 (4+3) involving 5%. One core high grade PIN. PSA at diagnosis October 2020 5.74 ng/mL \par May 2021 6.45 ng/mL \par

## 2021-05-30 NOTE — DISEASE MANAGEMENT
[1] : T1 [c] : c [0-10] : 0 -10 ng/mL [7(4+3)] : Linda Score 7(4+3) [0] : N0 [X] : MX [IIC] : IIC [] : Patient had no Bone Scan performed [BiopsyDate] : 11/10/2020 [TotalCores] : 12 [TotalPositiveCores] : 3 [MaxCoreInvolvement] : 5 [CTresults] : no pelvic lymphadenopathy  December 2020  [FreeTextEntry7] : 12 core biopsy demonstrated 2 cores Linda 6(3+3), [place] 7 (4+3) involving 5%. One core high grade PIN. PSA at diagnosis October 2020 5.74 ng/mL \par May 2021 6.45 ng/mL \par

## 2021-06-04 ENCOUNTER — RESULT REVIEW (OUTPATIENT)
Age: 66
End: 2021-06-04

## 2021-06-04 ENCOUNTER — APPOINTMENT (OUTPATIENT)
Dept: MRI IMAGING | Facility: IMAGING CENTER | Age: 66
End: 2021-06-04
Payer: COMMERCIAL

## 2021-06-04 ENCOUNTER — OUTPATIENT (OUTPATIENT)
Dept: OUTPATIENT SERVICES | Facility: HOSPITAL | Age: 66
LOS: 1 days | End: 2021-06-04
Payer: COMMERCIAL

## 2021-06-04 DIAGNOSIS — C61 MALIGNANT NEOPLASM OF PROSTATE: ICD-10-CM

## 2021-06-04 DIAGNOSIS — Z98.890 OTHER SPECIFIED POSTPROCEDURAL STATES: Chronic | ICD-10-CM

## 2021-06-04 PROCEDURE — 76498 UNLISTED MR PROCEDURE: CPT

## 2021-06-04 PROCEDURE — 72197 MRI PELVIS W/O & W/DYE: CPT

## 2021-06-04 PROCEDURE — 72197 MRI PELVIS W/O & W/DYE: CPT | Mod: 26

## 2021-06-04 PROCEDURE — 76498 UNLISTED MR PROCEDURE: CPT | Mod: 26

## 2021-06-04 PROCEDURE — A9585: CPT

## 2021-06-23 ENCOUNTER — APPOINTMENT (OUTPATIENT)
Dept: GASTROENTEROLOGY | Facility: CLINIC | Age: 66
End: 2021-06-23
Payer: COMMERCIAL

## 2021-06-23 VITALS
WEIGHT: 171 LBS | TEMPERATURE: 97.9 F | BODY MASS INDEX: 23.16 KG/M2 | HEIGHT: 72 IN | OXYGEN SATURATION: 96 % | SYSTOLIC BLOOD PRESSURE: 154 MMHG | HEART RATE: 87 BPM | DIASTOLIC BLOOD PRESSURE: 95 MMHG

## 2021-06-23 DIAGNOSIS — Z12.11 ENCOUNTER FOR SCREENING FOR MALIGNANT NEOPLASM OF COLON: ICD-10-CM

## 2021-06-23 DIAGNOSIS — C18.9 MALIGNANT NEOPLASM OF COLON, UNSPECIFIED: ICD-10-CM

## 2021-06-23 PROCEDURE — 99214 OFFICE O/P EST MOD 30 MIN: CPT

## 2021-06-23 PROCEDURE — 99072 ADDL SUPL MATRL&STAF TM PHE: CPT

## 2021-06-23 NOTE — PHYSICAL EXAM
[General Appearance - Alert] : alert [General Appearance - In No Acute Distress] : in no acute distress [FreeTextEntry1] : Thin [Sclera] : the sclera and conjunctiva were normal [PERRL With Normal Accommodation] : pupils were equal in size, round, and reactive to light [Extraocular Movements] : extraocular movements were intact [Outer Ear] : the ears and nose were normal in appearance [Oropharynx] : the oropharynx was normal [Neck Appearance] : the appearance of the neck was normal [Jugular Venous Distention Increased] : there was no jugular-venous distention [Neck Cervical Mass (___cm)] : no neck mass was observed [Thyroid Diffuse Enlargement] : the thyroid was not enlarged [Thyroid Nodule] : there were no palpable thyroid nodules [Auscultation Breath Sounds / Voice Sounds] : lungs were clear to auscultation bilaterally [Heart Rate And Rhythm] : heart rate was normal and rhythm regular [Heart Sounds] : normal S1 and S2 [Heart Sounds Gallop] : no gallops [Murmurs] : no murmurs [Heart Sounds Pericardial Friction Rub] : no pericardial rub [Bowel Sounds] : normal bowel sounds [Abdomen Soft] : soft [Abdomen Tenderness] : non-tender [] : no hepato-splenomegaly [Abdomen Mass (___ Cm)] : no abdominal mass palpated [No CVA Tenderness] : no ~M costovertebral angle tenderness [No Spinal Tenderness] : no spinal tenderness [Abnormal Walk] : normal gait [Nail Clubbing] : no clubbing  or cyanosis of the fingernails [Musculoskeletal - Swelling] : no joint swelling seen [Motor Tone] : muscle strength and tone were normal [Oriented To Time, Place, And Person] : oriented to person, place, and time [Impaired Insight] : insight and judgment were intact [Affect] : the affect was normal

## 2021-06-23 NOTE — ASSESSMENT
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  He will be scheduled for his surveillance colonoscopy.  CEA level will be ordered.\par Patient had an incidental finding of superior mesenteric artery stenosis on a CAT scan several years ago.  It has been stable since 2019 and he is asymptomatic.  His weight is stable and he has no postprandial abdominal pain.

## 2021-06-23 NOTE — REVIEW OF SYSTEMS
[Cough] : cough [SOB on Exertion] : shortness of breath during exertion [As Noted in HPI] : as noted in HPI [Negative] : Heme/Lymph [FreeTextEntry4] : Cancer of glottis [FreeTextEntry8] : Prostate cancer

## 2021-06-23 NOTE — HISTORY OF PRESENT ILLNESS
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  Course was completed in 4/2018.  He is doing well with no evidence of residual disease.\par \par Patient also has a history of excision of a sigmoid colon polyp with high-grade dysplasia.  His bowel movements are now much better on fiber supplements and Colace.  He denies seeing any blood in the stool.  His weight is stable.  He presents for a surveillance colonoscopy.\par \par Patient was found to have SMA stenosis on surveillance CT.  He does not complain of any postprandial pain.\par \par Patient was diagnosed with prostate cancer and has elected to undergo robotic surgery.

## 2021-06-30 ENCOUNTER — RX RENEWAL (OUTPATIENT)
Age: 66
End: 2021-06-30

## 2021-07-05 LAB — CEA SERPL-MCNC: 2.1 NG/ML

## 2021-07-15 ENCOUNTER — APPOINTMENT (OUTPATIENT)
Dept: GASTROENTEROLOGY | Facility: AMBULATORY MEDICAL SERVICES | Age: 66
End: 2021-07-15
Payer: COMMERCIAL

## 2021-07-15 PROCEDURE — 45380 COLONOSCOPY AND BIOPSY: CPT

## 2021-07-28 ENCOUNTER — OUTPATIENT (OUTPATIENT)
Dept: OUTPATIENT SERVICES | Facility: HOSPITAL | Age: 66
LOS: 1 days | End: 2021-07-28
Payer: COMMERCIAL

## 2021-07-28 VITALS
RESPIRATION RATE: 16 BRPM | OXYGEN SATURATION: 99 % | SYSTOLIC BLOOD PRESSURE: 120 MMHG | HEIGHT: 73 IN | WEIGHT: 171.08 LBS | HEART RATE: 73 BPM | DIASTOLIC BLOOD PRESSURE: 82 MMHG | TEMPERATURE: 98 F

## 2021-07-28 DIAGNOSIS — Z98.890 OTHER SPECIFIED POSTPROCEDURAL STATES: Chronic | ICD-10-CM

## 2021-07-28 DIAGNOSIS — C67.8 MALIGNANT NEOPLASM OF OVERLAPPING SITES OF BLADDER: ICD-10-CM

## 2021-07-28 DIAGNOSIS — Z92.3 PERSONAL HISTORY OF IRRADIATION: Chronic | ICD-10-CM

## 2021-07-28 DIAGNOSIS — C61 MALIGNANT NEOPLASM OF PROSTATE: ICD-10-CM

## 2021-07-28 DIAGNOSIS — Z92.21 PERSONAL HISTORY OF ANTINEOPLASTIC CHEMOTHERAPY: Chronic | ICD-10-CM

## 2021-07-28 DIAGNOSIS — K55.1 CHRONIC VASCULAR DISORDERS OF INTESTINE: ICD-10-CM

## 2021-07-28 LAB
ALBUMIN SERPL ELPH-MCNC: 4.6 G/DL — SIGNIFICANT CHANGE UP (ref 3.3–5)
ALP SERPL-CCNC: 66 U/L — SIGNIFICANT CHANGE UP (ref 40–120)
ALT FLD-CCNC: 22 U/L — SIGNIFICANT CHANGE UP (ref 4–41)
ANION GAP SERPL CALC-SCNC: 11 MMOL/L — SIGNIFICANT CHANGE UP (ref 7–14)
AST SERPL-CCNC: 27 U/L — SIGNIFICANT CHANGE UP (ref 4–40)
BILIRUB SERPL-MCNC: 0.5 MG/DL — SIGNIFICANT CHANGE UP (ref 0.2–1.2)
BLD GP AB SCN SERPL QL: NEGATIVE — SIGNIFICANT CHANGE UP
BUN SERPL-MCNC: 19 MG/DL — SIGNIFICANT CHANGE UP (ref 7–23)
CALCIUM SERPL-MCNC: 9.3 MG/DL — SIGNIFICANT CHANGE UP (ref 8.4–10.5)
CHLORIDE SERPL-SCNC: 103 MMOL/L — SIGNIFICANT CHANGE UP (ref 98–107)
CO2 SERPL-SCNC: 25 MMOL/L — SIGNIFICANT CHANGE UP (ref 22–31)
CREAT SERPL-MCNC: 1.04 MG/DL — SIGNIFICANT CHANGE UP (ref 0.5–1.3)
GLUCOSE SERPL-MCNC: 97 MG/DL — SIGNIFICANT CHANGE UP (ref 70–99)
HCT VFR BLD CALC: 47.4 % — SIGNIFICANT CHANGE UP (ref 39–50)
HGB BLD-MCNC: 16 G/DL — SIGNIFICANT CHANGE UP (ref 13–17)
MCHC RBC-ENTMCNC: 32.8 PG — SIGNIFICANT CHANGE UP (ref 27–34)
MCHC RBC-ENTMCNC: 33.8 GM/DL — SIGNIFICANT CHANGE UP (ref 32–36)
MCV RBC AUTO: 97.1 FL — SIGNIFICANT CHANGE UP (ref 80–100)
NRBC # BLD: 0 /100 WBCS — SIGNIFICANT CHANGE UP
NRBC # FLD: 0 K/UL — SIGNIFICANT CHANGE UP
PLATELET # BLD AUTO: 227 K/UL — SIGNIFICANT CHANGE UP (ref 150–400)
POTASSIUM SERPL-MCNC: 5.1 MMOL/L — SIGNIFICANT CHANGE UP (ref 3.5–5.3)
POTASSIUM SERPL-SCNC: 5.1 MMOL/L — SIGNIFICANT CHANGE UP (ref 3.5–5.3)
PROT SERPL-MCNC: 7.2 G/DL — SIGNIFICANT CHANGE UP (ref 6–8.3)
RBC # BLD: 4.88 M/UL — SIGNIFICANT CHANGE UP (ref 4.2–5.8)
RBC # FLD: 12 % — SIGNIFICANT CHANGE UP (ref 10.3–14.5)
RH IG SCN BLD-IMP: NEGATIVE — SIGNIFICANT CHANGE UP
SODIUM SERPL-SCNC: 139 MMOL/L — SIGNIFICANT CHANGE UP (ref 135–145)
WBC # BLD: 5.79 K/UL — SIGNIFICANT CHANGE UP (ref 3.8–10.5)
WBC # FLD AUTO: 5.79 K/UL — SIGNIFICANT CHANGE UP (ref 3.8–10.5)

## 2021-07-28 PROCEDURE — 93010 ELECTROCARDIOGRAM REPORT: CPT

## 2021-07-28 NOTE — H&P PST ADULT - ENERGY EXPENDITURE (METS)
Anesthesia Type: 1% lidocaine with epinephrine Size Of Lesion In Cm: 0.7 Body Location Override (Optional - Billing Will Still Be Based On Selected Body Map Location If Applicable): right anterior thigh Size Of Lesion After Curettage: 0.9 Hide Accession Number?: No Consent was obtained from the patient. The risks, benefits and alternatives to therapy were discussed in detail. Specifically, the risks of infection, scarring, bleeding, prolonged wound healing, nerve injury, incomplete removal, allergy to anesthesia and recurrence were addressed. Alternatives to ED&C, such as: surgical removal and XRT were also discussed.  Prior to the procedure, the treatment site was clearly identified and confirmed by the patient. All components of Universal Protocol/PAUSE Rule completed. Cautery Type: electrodesiccation Number Of Curettages: 3 What Was Performed First?: Curettage Bill As A Line Item Or As Units: Line Item Concentration (Mg/Ml Or Millions Of Plaque Forming Units/Cc): 0.01 Post-Care Instructions: I reviewed with the patient in detail post-care instructions. Patient is to wear sun protection, and avoid picking at any of the treated lesions. Pt was advised to wash daily with gentle soap and water and may apply Vaseline to crusted or scabbing areas. Additional Information: (Optional): The wound was cleaned, and a pressure dressing was applied.  The patient received detailed post-op instructions. Total Volume (Ccs): 1 Detail Level: Detailed Anesthesia Volume In Cc: 2 Render Post-Care Instructions In Note?: yes >4

## 2021-07-28 NOTE — H&P PST ADULT - HISTORY OF PRESENT ILLNESS
67 y/o male with HLD and h/o laryngeal CA in 2018 s/p chemo and radiation therapy presents to PST preop for single port robotic assisted radical prostatectomy pelvic lymph node dissection. pt with h/o elevated PSA. s/p prostate biopsy November 2020- positive for malignancy.         67 y/o male with HLD and h/o laryngeal CA in 2018 s/p chemo and radiation therapy presents to PST preop for single port robotic assisted radical prostatectomy pelvic lymph node dissection. pt with h/o elevated PSA. s/p prostate biopsy November 2020- positive for malignancy.

## 2021-07-28 NOTE — H&P PST ADULT - NSICDXPASTSURGICALHX_GEN_ALL_CORE_FT
PAST SURGICAL HISTORY:  H/O colonoscopy 2017    H/O prostate biopsy     History of chemotherapy     S/P radiation therapy

## 2021-07-28 NOTE — H&P PST ADULT - NSICDXPROBLEM_GEN_ALL_CORE_FT
PROBLEM DIAGNOSES  Problem: Malignant neoplasm of prostate  Assessment and Plan: preop for single port robotic assisted radical prostatectomy pelvic lymph node dissection on 8/10/21  preop instructions given, pt verbalized understanding  GI prophylaxis and chlorhexidine wash provided  proof of COVID vaccination on chart  medical clearance requested          PROBLEM DIAGNOSES  Problem: Malignant neoplasm of prostate  Assessment and Plan: preop for single port robotic assisted radical prostatectomy pelvic lymph node dissection on 8/10/21  preop instructions given, pt verbalized understanding  GI prophylaxis and chlorhexidine wash provided  proof of COVID vaccination on chart  medical clearance requested       Problem: Superior mesenteric artery stenosis  Assessment and Plan: vascular evaluation requested. last visit note and last duplex scan report on chart from Jan 2021

## 2021-07-28 NOTE — H&P PST ADULT - NSICDXPASTMEDICALHX_GEN_ALL_CORE_FT
PAST MEDICAL HISTORY:  Aneurysm of aortic root     Colonic polyp malignant    Elevated blood pressure reading on occasion    History of COPD no use of inhalers    HLD (hyperlipidemia)     Laryngeal cancer     Laryngeal polyp     Mild carotid artery disease     Prostate cancer     Pulmonary nodules pt states nodules being monitored by pulmonary    Stricture of artery     Superior mesenteric artery stenosis followed by Vascular. last visit Jan 2021.    Tubular adenoma of colon

## 2021-07-28 NOTE — H&P PST ADULT - HEALTH CARE MAINTENANCE
Patient requested refill for Lisinopril-HCTZ.  Rx routed to Dr. Gallito Davis for approval. pt received Moderna COVID-19 vaccine   1st dose 4/8/21  2nd dose 5/5/21

## 2021-07-28 NOTE — H&P PST ADULT - MUSCULOSKELETAL
negative No joint pain, swelling or deformity; no limitation of movement detailed exam ROM intact/no calf tenderness/normal strength

## 2021-07-29 ENCOUNTER — APPOINTMENT (OUTPATIENT)
Dept: VASCULAR SURGERY | Facility: CLINIC | Age: 66
End: 2021-07-29
Payer: COMMERCIAL

## 2021-07-29 VITALS
DIASTOLIC BLOOD PRESSURE: 86 MMHG | WEIGHT: 170 LBS | SYSTOLIC BLOOD PRESSURE: 155 MMHG | HEIGHT: 72 IN | BODY MASS INDEX: 23.03 KG/M2 | HEART RATE: 83 BPM

## 2021-07-29 PROBLEM — K63.5 POLYP OF COLON: Chronic | Status: ACTIVE | Noted: 2018-03-12

## 2021-07-29 PROBLEM — I71.9 AORTIC ANEURYSM OF UNSPECIFIED SITE, WITHOUT RUPTURE: Chronic | Status: ACTIVE | Noted: 2021-07-28

## 2021-07-29 PROBLEM — K55.1 CHRONIC VASCULAR DISORDERS OF INTESTINE: Chronic | Status: ACTIVE | Noted: 2021-07-28

## 2021-07-29 PROBLEM — I77.1 STRICTURE OF ARTERY: Chronic | Status: ACTIVE | Noted: 2021-07-28

## 2021-07-29 PROBLEM — C61 MALIGNANT NEOPLASM OF PROSTATE: Chronic | Status: ACTIVE | Noted: 2021-07-28

## 2021-07-29 PROBLEM — E78.5 HYPERLIPIDEMIA, UNSPECIFIED: Chronic | Status: ACTIVE | Noted: 2021-07-28

## 2021-07-29 PROBLEM — R03.0 ELEVATED BLOOD-PRESSURE READING, WITHOUT DIAGNOSIS OF HYPERTENSION: Chronic | Status: ACTIVE | Noted: 2021-07-28

## 2021-07-29 PROBLEM — I77.9 DISORDER OF ARTERIES AND ARTERIOLES, UNSPECIFIED: Chronic | Status: ACTIVE | Noted: 2021-07-28

## 2021-07-29 PROBLEM — R91.8 OTHER NONSPECIFIC ABNORMAL FINDING OF LUNG FIELD: Chronic | Status: ACTIVE | Noted: 2021-07-28

## 2021-07-29 PROBLEM — Z87.09 PERSONAL HISTORY OF OTHER DISEASES OF THE RESPIRATORY SYSTEM: Chronic | Status: ACTIVE | Noted: 2021-07-28

## 2021-07-29 LAB
CULTURE RESULTS: SIGNIFICANT CHANGE UP
SPECIMEN SOURCE: SIGNIFICANT CHANGE UP

## 2021-07-29 PROCEDURE — 93880 EXTRACRANIAL BILAT STUDY: CPT

## 2021-07-29 PROCEDURE — 93976 VASCULAR STUDY: CPT

## 2021-07-29 PROCEDURE — 99214 OFFICE O/P EST MOD 30 MIN: CPT

## 2021-07-29 RX ORDER — SODIUM SULFATE, MAGNESIUM SULFATE, AND POTASSIUM CHLORIDE 17.75; 2.7; 2.25 G/1; G/1; G/1
1479-225-188 TABLET ORAL
Qty: 1 | Refills: 0 | Status: COMPLETED | COMMUNITY
Start: 2021-06-23 | End: 2021-07-29

## 2021-07-29 NOTE — PHYSICAL EXAM
[Normal Breath Sounds] : Normal breath sounds [Respiratory Effort] : normal respiratory effort [Normal Heart Sounds] : normal heart sounds [Normal Rate and Rhythm] : normal rate and rhythm [Femoral Arteries] : Normal femoral pulses [2+] : left 2+ [0] : left 0 [No Rash or Lesion] : No rash or lesion [Calm] : calm [JVD] : no jugular venous distention  [Carotid Bruits] : no carotid bruits [Right Carotid Bruit] : no bruit heard over the right carotid [Left Carotid Bruit] : no bruit heard over the left carotid [Ankle Swelling (On Exam)] : not present [de-identified] : Well-appearing  [de-identified] : EOMI, anicteric, CN II-XII grossly intact [de-identified] : Supple [de-identified] : soft, nt, nd, no abd bruit [de-identified] : motor and sensation intact in all four extremities  [de-identified] : A&Ox4

## 2021-07-29 NOTE — HISTORY OF PRESENT ILLNESS
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 No diarrhea or constipation. No history of food fear or recent weight loss. He states that he lost weight when he underwent chemotherapy but since then has regained that weight back. \par He is a former smoker (>1ppd for 35 years) but still smokes 4 ecigarettes per day.\par He has stopped alcohol use at the time of his cancer diagnosis. He does not use any other substances. \par \par 1/28/21 pt presents today for follow up regarding SMA stenosis. He remains asymptomatic and denies any new complaints or changes. No recent weight loss or abdominal pain. He is still smoking e-cigarettes. He has not been taking ASA 81mg.  [de-identified] : 7/29/2021 - Pt presents for follow up. Patient is scheduled for prostate surgery on August 10, 2021. Here for vascular evaluation prior to surgery. \par Denies lower extremity symptoms - no symptoms of claudication or pain. Reports having been active all summer without issues. \par In regards to SMA stenosis, remains asymptomatic. No postprandial pain. No nausea or vomiting, no changes to bowel habits. \par In regards to carotid stenosis, remains asymptomatic. No episodes of unilateral numbness or weakness, no motor or sensory loss. No amaurosis. Denies symptoms of stroke or TIA.

## 2021-07-29 NOTE — DATA REVIEWED
[FreeTextEntry1] : 7/29/2021-Mesenteric duplex\par Technically difficult due to bowel gas.\par > 70% SMA stenosis.\par \par 7/29/2021- Carotid duplex\par BL Carotid < 50% stenosis. antegrade vertebral flow. \par ---------------------------------------------\par 1/28/21- Mesenteric Duplex \par Technically difficult due to bowel gas\par Evidence of stenosis of SMA 70-99%\par ----------------------------------------------\par 7/23/2020 - Carotid duplex\par BLE < 50% ICA stenosis\par \par 7/23/2020 - Mesenteric duplex \par Technically difficult due to bowel gas.\par Suspected >70% stenosis SMA,

## 2021-07-29 NOTE — ASSESSMENT
[FreeTextEntry1] : 65 year old man with history of squamous ca of glottis s/p chemoradiation, here for evaluation of asymptomatic SMA stenosis. \par \par 1. Asymptomatic SMA stenosis\par - No urgent surgical intervention is needed at this time as patient is asymptomatic.\par - Patient educated on symptoms of mesenteric ischemia.\par - Recommend medical management and optimization. Continue to take aspirin and simvastatin. \par - Follow up in 6 months for repeat mesenteric duplex.\par \par 2. Mild carotid stenosis\par - Repeat duplex with < 50% stenosis bilaterally. \par - Repeat duplex in 1 year. \par \par 3. No vascular contraindication to proceed with planned prostate surgery.

## 2021-07-30 ENCOUNTER — APPOINTMENT (OUTPATIENT)
Dept: PULMONOLOGY | Facility: CLINIC | Age: 66
End: 2021-07-30
Payer: COMMERCIAL

## 2021-07-30 VITALS
RESPIRATION RATE: 16 BRPM | DIASTOLIC BLOOD PRESSURE: 85 MMHG | TEMPERATURE: 98.2 F | HEART RATE: 88 BPM | SYSTOLIC BLOOD PRESSURE: 147 MMHG | OXYGEN SATURATION: 94 %

## 2021-07-30 LAB — POCT - HEMOGLOBIN (HGB), QUANTITATIVE, TRANSCUTANEOUS: 16.4

## 2021-07-30 PROCEDURE — 94010 BREATHING CAPACITY TEST: CPT

## 2021-07-30 PROCEDURE — 94729 DIFFUSING CAPACITY: CPT

## 2021-07-30 PROCEDURE — 99214 OFFICE O/P EST MOD 30 MIN: CPT | Mod: 25

## 2021-07-30 PROCEDURE — 88738 HGB QUANT TRANSCUTANEOUS: CPT

## 2021-07-30 PROCEDURE — 94727 GAS DIL/WSHOT DETER LNG VOL: CPT

## 2021-07-30 PROCEDURE — ZZZZZ: CPT

## 2021-08-06 DIAGNOSIS — Z01.818 ENCOUNTER FOR OTHER PREPROCEDURAL EXAMINATION: ICD-10-CM

## 2021-08-06 LAB — BACTERIA UR CULT: NORMAL

## 2021-08-06 NOTE — DISCUSSION/SUMMARY
[FreeTextEntry1] : Prostate cancer.  Preop for prostatectomy\par Mild COPD.\par Asymptomatic SMA stenosis.\par Mild carotid disease.\par Hyperlipidemia.\par History of laryngeal CA.\par Status post Covid virus infection without sequela.\par

## 2021-08-06 NOTE — PROCEDURE
[FreeTextEntry1] : Cardiogram reveals normal sinus rhythm.\par Labs reviewed.\par \par 07/30/2021\par Pulmonary function testing\par These data demonstrate a mild obstructive ventilatory deficit. There is evidence of mild hyperinflation. There is a moderate diffusion impairment. \par PFT attached relatively stable function.\par

## 2021-08-06 NOTE — ASSESSMENT
[FreeTextEntry1] : \par \par Medications reviewed and renewed.\par Labs drawn in office today\par \par Medical problems as above. Medical status maximized. No medical contraindications to surgery.\par \par

## 2021-08-06 NOTE — HISTORY OF PRESENT ILLNESS
[Former] : former [TextBox_4] :  going for robotic  prostatectomy August 10 Th adam Scott. Fax to [phone] Urologist Dr Crooks \par has recent vascular studies fro Dr Kristina Crooks\karlie went for pre surgical testing this past weekday and labs\par \par Feeling well.\par \par Denies chest pain or shortness of breath.  No recent respiratory illnesses.\par \par \par \par \par  [YearQuit] : 2017

## 2021-08-07 ENCOUNTER — APPOINTMENT (OUTPATIENT)
Dept: DISASTER EMERGENCY | Facility: CLINIC | Age: 66
End: 2021-08-07

## 2021-08-07 LAB — SARS-COV-2 N GENE NPH QL NAA+PROBE: NOT DETECTED

## 2021-08-09 ENCOUNTER — TRANSCRIPTION ENCOUNTER (OUTPATIENT)
Age: 66
End: 2021-08-09

## 2021-08-09 NOTE — ASU PATIENT PROFILE, ADULT - PMH
Aneurysm of aortic root    Colonic polyp  malignant  Elevated blood pressure reading  on occasion  History of COPD  no use of inhalers  HLD (hyperlipidemia)    Laryngeal cancer    Laryngeal polyp    Mild carotid artery disease    Prostate cancer    Pulmonary nodules  pt states nodules being monitored by pulmonary  Stricture of artery    Superior mesenteric artery stenosis  followed by Vascular. last visit Jan 2021.  Tubular adenoma of colon

## 2021-08-10 ENCOUNTER — APPOINTMENT (OUTPATIENT)
Dept: UROLOGY | Facility: HOSPITAL | Age: 66
End: 2021-08-10

## 2021-08-10 ENCOUNTER — RESULT REVIEW (OUTPATIENT)
Age: 66
End: 2021-08-10

## 2021-08-10 ENCOUNTER — INPATIENT (INPATIENT)
Facility: HOSPITAL | Age: 66
LOS: 2 days | Discharge: HOME | End: 2021-08-13
Attending: UROLOGY | Admitting: UROLOGY
Payer: COMMERCIAL

## 2021-08-10 VITALS
OXYGEN SATURATION: 95 % | TEMPERATURE: 98 F | RESPIRATION RATE: 18 BRPM | HEART RATE: 72 BPM | DIASTOLIC BLOOD PRESSURE: 75 MMHG | HEIGHT: 72 IN | WEIGHT: 169.98 LBS | SYSTOLIC BLOOD PRESSURE: 103 MMHG

## 2021-08-10 DIAGNOSIS — C67.8 MALIGNANT NEOPLASM OF OVERLAPPING SITES OF BLADDER: ICD-10-CM

## 2021-08-10 DIAGNOSIS — Z98.890 OTHER SPECIFIED POSTPROCEDURAL STATES: Chronic | ICD-10-CM

## 2021-08-10 DIAGNOSIS — Z92.21 PERSONAL HISTORY OF ANTINEOPLASTIC CHEMOTHERAPY: Chronic | ICD-10-CM

## 2021-08-10 DIAGNOSIS — Z92.3 PERSONAL HISTORY OF IRRADIATION: Chronic | ICD-10-CM

## 2021-08-10 LAB — RH IG SCN BLD-IMP: NEGATIVE — SIGNIFICANT CHANGE UP

## 2021-08-10 PROCEDURE — 55866 LAPS SURG PRST8ECT RPBIC RAD: CPT

## 2021-08-10 PROCEDURE — 88307 TISSUE EXAM BY PATHOLOGIST: CPT | Mod: 26

## 2021-08-10 PROCEDURE — 44603 SUTURE SMALL INTESTINE: CPT | Mod: 59

## 2021-08-10 PROCEDURE — 38571 LAPAROSCOPY LYMPHADENECTOMY: CPT

## 2021-08-10 PROCEDURE — 88309 TISSUE EXAM BY PATHOLOGIST: CPT | Mod: 26

## 2021-08-10 RX ORDER — OXYCODONE HYDROCHLORIDE 5 MG/1
10 TABLET ORAL ONCE
Refills: 0 | Status: DISCONTINUED | OUTPATIENT
Start: 2021-08-10 | End: 2021-08-10

## 2021-08-10 RX ORDER — SODIUM CHLORIDE 9 MG/ML
3 INJECTION INTRAMUSCULAR; INTRAVENOUS; SUBCUTANEOUS EVERY 8 HOURS
Refills: 0 | Status: DISCONTINUED | OUTPATIENT
Start: 2021-08-10 | End: 2021-08-10

## 2021-08-10 RX ORDER — SODIUM CHLORIDE 9 MG/ML
1000 INJECTION, SOLUTION INTRAVENOUS
Refills: 0 | Status: DISCONTINUED | OUTPATIENT
Start: 2021-08-10 | End: 2021-08-10

## 2021-08-10 RX ORDER — SODIUM CHLORIDE 9 MG/ML
1000 INJECTION, SOLUTION INTRAVENOUS
Refills: 0 | Status: DISCONTINUED | OUTPATIENT
Start: 2021-08-10 | End: 2021-08-11

## 2021-08-10 RX ORDER — AMOXICILLIN AND CLAVULANATE POTASSIUM 875; 125 MG/1; MG/1
875-125 TABLET, COATED ORAL
Qty: 10 | Refills: 0 | Status: COMPLETED | COMMUNITY
Start: 2021-07-30 | End: 2021-08-10

## 2021-08-10 RX ORDER — KETOROLAC TROMETHAMINE 30 MG/ML
15 SYRINGE (ML) INJECTION EVERY 6 HOURS
Refills: 0 | Status: DISCONTINUED | OUTPATIENT
Start: 2021-08-10 | End: 2021-08-12

## 2021-08-10 RX ORDER — SIMVASTATIN 20 MG/1
20 TABLET, FILM COATED ORAL AT BEDTIME
Refills: 0 | Status: DISCONTINUED | OUTPATIENT
Start: 2021-08-10 | End: 2021-08-13

## 2021-08-10 RX ORDER — OXYCODONE HYDROCHLORIDE 5 MG/1
5 TABLET ORAL EVERY 6 HOURS
Refills: 0 | Status: DISCONTINUED | OUTPATIENT
Start: 2021-08-10 | End: 2021-08-13

## 2021-08-10 RX ORDER — HYDROMORPHONE HYDROCHLORIDE 2 MG/ML
0.5 INJECTION INTRAMUSCULAR; INTRAVENOUS; SUBCUTANEOUS
Refills: 0 | Status: DISCONTINUED | OUTPATIENT
Start: 2021-08-10 | End: 2021-08-10

## 2021-08-10 RX ORDER — OXYCODONE HYDROCHLORIDE 5 MG/1
5 TABLET ORAL ONCE
Refills: 0 | Status: DISCONTINUED | OUTPATIENT
Start: 2021-08-10 | End: 2021-08-10

## 2021-08-10 RX ORDER — ACETAMINOPHEN 500 MG
975 TABLET ORAL EVERY 6 HOURS
Refills: 0 | Status: DISCONTINUED | OUTPATIENT
Start: 2021-08-10 | End: 2021-08-13

## 2021-08-10 RX ORDER — HEPARIN SODIUM 5000 [USP'U]/ML
5000 INJECTION INTRAVENOUS; SUBCUTANEOUS EVERY 8 HOURS
Refills: 0 | Status: DISCONTINUED | OUTPATIENT
Start: 2021-08-10 | End: 2021-08-13

## 2021-08-10 RX ORDER — HYDROMORPHONE HYDROCHLORIDE 2 MG/ML
1 INJECTION INTRAMUSCULAR; INTRAVENOUS; SUBCUTANEOUS
Refills: 0 | Status: DISCONTINUED | OUTPATIENT
Start: 2021-08-10 | End: 2021-08-10

## 2021-08-10 RX ORDER — SENNA PLUS 8.6 MG/1
2 TABLET ORAL AT BEDTIME
Refills: 0 | Status: DISCONTINUED | OUTPATIENT
Start: 2021-08-10 | End: 2021-08-13

## 2021-08-10 RX ORDER — ASPIRIN/CALCIUM CARB/MAGNESIUM 324 MG
81 TABLET ORAL DAILY
Refills: 0 | Status: DISCONTINUED | OUTPATIENT
Start: 2021-08-10 | End: 2021-08-13

## 2021-08-10 RX ADMIN — Medication 15 MILLIGRAM(S): at 17:22

## 2021-08-10 RX ADMIN — SODIUM CHLORIDE 125 MILLILITER(S): 9 INJECTION, SOLUTION INTRAVENOUS at 22:04

## 2021-08-10 RX ADMIN — Medication 975 MILLIGRAM(S): at 17:22

## 2021-08-10 RX ADMIN — SIMVASTATIN 20 MILLIGRAM(S): 20 TABLET, FILM COATED ORAL at 22:04

## 2021-08-10 RX ADMIN — SODIUM CHLORIDE 125 MILLILITER(S): 9 INJECTION, SOLUTION INTRAVENOUS at 14:00

## 2021-08-10 RX ADMIN — HEPARIN SODIUM 5000 UNIT(S): 5000 INJECTION INTRAVENOUS; SUBCUTANEOUS at 14:56

## 2021-08-10 RX ADMIN — HEPARIN SODIUM 5000 UNIT(S): 5000 INJECTION INTRAVENOUS; SUBCUTANEOUS at 22:05

## 2021-08-10 RX ADMIN — SODIUM CHLORIDE 30 MILLILITER(S): 9 INJECTION, SOLUTION INTRAVENOUS at 07:07

## 2021-08-10 NOTE — PROGRESS NOTE ADULT - SUBJECTIVE AND OBJECTIVE BOX
Post op check    This  67 yo M is s/p RALP/LND   PMH: COPD; chol  Pt is awake and alert  Has no c/o  Afeb 128/78 76 95%RA    Abd- soft; appropriately tender             wounds C&D  Lorenz 275

## 2021-08-10 NOTE — BRIEF OPERATIVE NOTE - NSICDXBRIEFPROCEDURE_GEN_ALL_CORE_FT
PROCEDURES:  Robotic-assisted prostatectomy with pelvic lymph node dissection 10-Aug-2021 13:15:36  Michael Mccrary  Repair, enterotomy 10-Aug-2021 13:15:45  Michael Mccrary

## 2021-08-10 NOTE — BRIEF OPERATIVE NOTE - OPERATION/FINDINGS
Sharp Small bowel enterotomy on initial entry into the abdomen, repaired primarily with silk sutures.  Imbricated a layer with 3-0 silks    Single port robotic prostatectomy, b/l PLND

## 2021-08-11 DIAGNOSIS — E78.5 HYPERLIPIDEMIA, UNSPECIFIED: ICD-10-CM

## 2021-08-11 DIAGNOSIS — R91.8 OTHER NONSPECIFIC ABNORMAL FINDING OF LUNG FIELD: ICD-10-CM

## 2021-08-11 DIAGNOSIS — S36.899A UNSPECIFIED INJURY OF OTHER INTRA-ABDOMINAL ORGANS, INITIAL ENCOUNTER: ICD-10-CM

## 2021-08-11 DIAGNOSIS — Z29.9 ENCOUNTER FOR PROPHYLACTIC MEASURES, UNSPECIFIED: ICD-10-CM

## 2021-08-11 DIAGNOSIS — Z87.09 PERSONAL HISTORY OF OTHER DISEASES OF THE RESPIRATORY SYSTEM: ICD-10-CM

## 2021-08-11 LAB
COVID-19 SPIKE DOMAIN AB INTERP: POSITIVE
COVID-19 SPIKE DOMAIN ANTIBODY RESULT: >250 U/ML — HIGH
SARS-COV-2 IGG+IGM SERPL QL IA: >250 U/ML — HIGH
SARS-COV-2 IGG+IGM SERPL QL IA: POSITIVE

## 2021-08-11 PROCEDURE — 99223 1ST HOSP IP/OBS HIGH 75: CPT

## 2021-08-11 RX ORDER — SODIUM CHLORIDE 9 MG/ML
1000 INJECTION, SOLUTION INTRAVENOUS
Refills: 0 | Status: DISCONTINUED | OUTPATIENT
Start: 2021-08-11 | End: 2021-08-12

## 2021-08-11 RX ADMIN — HEPARIN SODIUM 5000 UNIT(S): 5000 INJECTION INTRAVENOUS; SUBCUTANEOUS at 21:48

## 2021-08-11 RX ADMIN — Medication 15 MILLIGRAM(S): at 00:55

## 2021-08-11 RX ADMIN — Medication 15 MILLIGRAM(S): at 11:56

## 2021-08-11 RX ADMIN — HEPARIN SODIUM 5000 UNIT(S): 5000 INJECTION INTRAVENOUS; SUBCUTANEOUS at 05:04

## 2021-08-11 RX ADMIN — Medication 975 MILLIGRAM(S): at 05:04

## 2021-08-11 RX ADMIN — Medication 81 MILLIGRAM(S): at 11:56

## 2021-08-11 RX ADMIN — Medication 975 MILLIGRAM(S): at 17:43

## 2021-08-11 RX ADMIN — SIMVASTATIN 20 MILLIGRAM(S): 20 TABLET, FILM COATED ORAL at 21:48

## 2021-08-11 RX ADMIN — SODIUM CHLORIDE 125 MILLILITER(S): 9 INJECTION, SOLUTION INTRAVENOUS at 05:04

## 2021-08-11 RX ADMIN — HEPARIN SODIUM 5000 UNIT(S): 5000 INJECTION INTRAVENOUS; SUBCUTANEOUS at 13:31

## 2021-08-11 RX ADMIN — SODIUM CHLORIDE 75 MILLILITER(S): 9 INJECTION, SOLUTION INTRAVENOUS at 10:24

## 2021-08-11 RX ADMIN — Medication 975 MILLIGRAM(S): at 11:56

## 2021-08-11 RX ADMIN — Medication 15 MILLIGRAM(S): at 17:44

## 2021-08-11 RX ADMIN — Medication 975 MILLIGRAM(S): at 00:55

## 2021-08-11 RX ADMIN — Medication 15 MILLIGRAM(S): at 05:04

## 2021-08-11 NOTE — CONSULT NOTE ADULT - ASSESSMENT
67 y/o male with h/o HLD, laryngeal CA in 2018 s/p chemo and radiation therapy, recently diagnosed prostate CA and admitted for robotic assisted radical prostatectomy w/ pelvic lymph node dissection. Course complicated by small bowel injury s/p repair.

## 2021-08-11 NOTE — PROGRESS NOTE ADULT - ASSESSMENT
stable s/p RALP/LND/small bowel injury    POD#1 - dez sips; passed gas  Plan:  - OOB  - adv diet as per att

## 2021-08-11 NOTE — PROGRESS NOTE ADULT - ATTENDING COMMENTS
Doing well. +Flatus No abdominal pain. No distention, no N/V.  Tolerating clears.  Lorenz draining clear urine.  Continue clears, advance diet to regular for dinner.  Possible D/C home today or tomorrow.

## 2021-08-11 NOTE — CONSULT NOTE ADULT - PROBLEM SELECTOR RECOMMENDATION 9
s/p robotic assisted radical prostatectomy w/ pelvic lymph node dissection on 8/10  Lorenz removed, TOV   CLD, gradually advance   Pain control   Management as per  s/p robotic assisted radical prostatectomy w/ pelvic lymph node dissection on 8/10  Lorenz removed, TOV   CLD, gradually advance   Pain control   Management as per .

## 2021-08-11 NOTE — PROGRESS NOTE ADULT - SUBJECTIVE AND OBJECTIVE BOX
POD #1  Afeb 133/76 76 97%RA    Pt has no c/o  Abd- soft NT ND; + flatus     wounds C&D  Lorenz 700  Jenny sips  Was OOB already

## 2021-08-11 NOTE — CONSULT NOTE ADULT - SUBJECTIVE AND OBJECTIVE BOX
HPI:  67 y/o male with h/o HLD, laryngeal CA in 2018 s/p chemo and radiation therapy, recently diagnosed prostate CA and admitted for robotic assisted radical prostatectomy w/ pelvic lymph node dissection. Patient with h/o elevated PSA, s/p prostate biopsy in November 2020 that was positive for malignancy. Patient underwent robotic assisted radical prostatectomy w/ pelvic lymph node dissection on 8/10. Surgery was complicated by small bowel injury s/p repair. Currently patient is doing well. He is c/o mild abdominal pain around incision site since surgery, dull, constant, 3 out of 10 on pain scale, non-radiating, worse w/ movement, controlled w/ PO Tylenol. Patient already passed flatus, denies nausea or vomiting.       PAST MEDICAL & SURGICAL HISTORY:  Laryngeal polyp  Laryngeal cancer s/p chemo/RT   Tubular adenoma of colon  Elevated blood pressure reading  HLD (hyperlipidemia)  Prostate cancer  Pulmonary nodules  Aneurysm of aortic root  History of COPD (no use of inhalers)  Mild carotid artery disease  Stricture of artery  Superior mesenteric artery stenosis  H/O prostate biopsy        Review of Systems:   CONSTITUTIONAL: No fever, weight loss, or fatigue  EYES: No eye pain, visual disturbances, or discharge  ENMT:  No difficulty hearing, tinnitus, vertigo; No sinus or throat pain  NECK: No pain or stiffness  BREASTS: No pain, masses, or nipple discharge  RESPIRATORY: No cough, wheezing, chills or hemoptysis; No shortness of breath  CARDIOVASCULAR: No chest pain, palpitations, dizziness, or leg swelling  GASTROINTESTINAL: +abdominal pain. No nausea, vomiting, or hematemesis; No diarrhea or constipation. No melena or hematochezia.  GENITOURINARY: No dysuria, frequency, hematuria, or incontinence  NEUROLOGICAL: No headaches, memory loss, loss of strength, numbness, or tremors  SKIN: No itching, burning, rashes, or lesions   LYMPH NODES: No enlarged glands  ENDOCRINE: No heat or cold intolerance; No hair loss  MUSCULOSKELETAL: No joint pain or swelling; No muscle, back, or extremity pain  PSYCHIATRIC: No depression, anxiety, mood swings, or difficulty sleeping  HEME/LYMPH: No easy bruising, or bleeding gums  ALLERY AND IMMUNOLOGIC: No hives or eczema    Allergies    No Known Allergies    Intolerances    Social History:   Former smoker, smoked 1PPD x 35 years   Drinks 1-2 glasses of wine 2-3 times a week     FAMILY HISTORY:  Brother w/ prostate CA       MEDICATIONS  (STANDING):  acetaminophen   Tablet .. 975 milliGRAM(s) Oral every 6 hours  aspirin enteric coated 81 milliGRAM(s) Oral daily  dextrose 5% + sodium chloride 0.45%. 1000 milliLiter(s) (75 mL/Hr) IV Continuous <Continuous>  heparin   Injectable 5000 Unit(s) SubCutaneous every 8 hours  ketorolac   Injectable 15 milliGRAM(s) IV Push every 6 hours  simvastatin 20 milliGRAM(s) Oral at bedtime    MEDICATIONS  (PRN):  oxyCODONE    IR 5 milliGRAM(s) Oral every 6 hours PRN Severe Pain (7 - 10)  senna 2 Tablet(s) Oral at bedtime PRN Constipation      Vital Signs Last 24 Hrs  T(C): 36.7 (11 Aug 2021 08:56), Max: 36.8 (10 Aug 2021 13:30)  T(F): 98.1 (11 Aug 2021 08:56), Max: 98.2 (10 Aug 2021 13:30)  HR: 83 (11 Aug 2021 08:56) (71 - 83)  BP: 138/83 (11 Aug 2021 08:56) (118/67 - 148/82)  BP(mean): 82 (10 Aug 2021 15:45) (82 - 98)  RR: 16 (11 Aug 2021 08:56) (10 - 17)  SpO2: 97% (11 Aug 2021 08:56) (92% - 98%)  CAPILLARY BLOOD GLUCOSE        I&O's Summary    10 Aug 2021 07:01  -  11 Aug 2021 07:00  --------------------------------------------------------  IN: 300 mL / OUT: 965 mL / NET: -665 mL    11 Aug 2021 07:01  -  11 Aug 2021 12:44  --------------------------------------------------------  IN: 0 mL / OUT: 100 mL / NET: -100 mL        PHYSICAL EXAM:  GENERAL: NAD, well-developed  HEAD:  Atraumatic, Normocephalic  EYES: EOMI, PERRLA, conjunctiva and sclera clear  NECK: Supple, No JVD  CHEST/LUNG: Clear to auscultation bilaterally; No wheeze  HEART: Regular rate and rhythm; No murmurs, rubs, or gallops  ABDOMEN: Soft, appropriately tender, Nondistended; Bowel sounds present. Incisions c/d/i   EXTREMITIES:  2+ Peripheral Pulses, No clubbing, cyanosis, or edema  PSYCH: AAOx3  NEUROLOGY: non-focal  SKIN: No rashes or lesions    LABS:    Labs from July 28. 2021 reviewed:     WBC 5/79  Hgb 16  Platelets 227    Na 139  K 5.1  BUN 19  Cr 1.04        RADIOLOGY & ADDITIONAL TESTS:    Imaging Personally Reviewed:  MRI prostate from June 2021:  Right, posterior lateral (PZpl), midgland, peripheral zone lesion as detailed above. The lesion(s) have been marked in DynaCAD in anticipation for fusion biopsy.  *PIRADS 4 - High (clinically significant cancer is likely to be present)  No extraprostatic extension, seminal vesicle invasion, or pelvic lymphadenopathy.  Diffusely increased marrow enhancement throughout the bones may represent red marrow activation. Correlate for underlying hematologic disorder.    EKG tracing reviewed and interpreted: NSR     Consultant(s) Notes Reviewed:      Care Discussed with Consultants/Other Providers:

## 2021-08-11 NOTE — CONSULT NOTE ADULT - PROBLEM SELECTOR RECOMMENDATION 2
Sharp small bowel enterotomy on initial entry into the abdomen, s/p repair  On CLD, advance gradually

## 2021-08-12 ENCOUNTER — APPOINTMENT (OUTPATIENT)
Dept: VASCULAR SURGERY | Facility: CLINIC | Age: 66
End: 2021-08-12

## 2021-08-12 ENCOUNTER — TRANSCRIPTION ENCOUNTER (OUTPATIENT)
Age: 66
End: 2021-08-12

## 2021-08-12 PROCEDURE — 99232 SBSQ HOSP IP/OBS MODERATE 35: CPT

## 2021-08-12 RX ORDER — LANOLIN ALCOHOL/MO/W.PET/CERES
3 CREAM (GRAM) TOPICAL AT BEDTIME
Refills: 0 | Status: DISCONTINUED | OUTPATIENT
Start: 2021-08-12 | End: 2021-08-13

## 2021-08-12 RX ADMIN — Medication 15 MILLIGRAM(S): at 11:52

## 2021-08-12 RX ADMIN — HEPARIN SODIUM 5000 UNIT(S): 5000 INJECTION INTRAVENOUS; SUBCUTANEOUS at 21:28

## 2021-08-12 RX ADMIN — Medication 975 MILLIGRAM(S): at 18:19

## 2021-08-12 RX ADMIN — Medication 3 MILLIGRAM(S): at 21:28

## 2021-08-12 RX ADMIN — Medication 975 MILLIGRAM(S): at 06:24

## 2021-08-12 RX ADMIN — HEPARIN SODIUM 5000 UNIT(S): 5000 INJECTION INTRAVENOUS; SUBCUTANEOUS at 05:20

## 2021-08-12 RX ADMIN — Medication 81 MILLIGRAM(S): at 11:52

## 2021-08-12 RX ADMIN — Medication 975 MILLIGRAM(S): at 00:36

## 2021-08-12 RX ADMIN — HEPARIN SODIUM 5000 UNIT(S): 5000 INJECTION INTRAVENOUS; SUBCUTANEOUS at 14:00

## 2021-08-12 RX ADMIN — Medication 15 MILLIGRAM(S): at 00:36

## 2021-08-12 RX ADMIN — SIMVASTATIN 20 MILLIGRAM(S): 20 TABLET, FILM COATED ORAL at 21:28

## 2021-08-12 RX ADMIN — Medication 975 MILLIGRAM(S): at 11:52

## 2021-08-12 NOTE — DISCHARGE NOTE NURSING/CASE MANAGEMENT/SOCIAL WORK - NSDPDISTO_GEN_ALL_CORE
Home Pt  with incisions CDI steri strips to scope sites, VS stable Afebrile. pt with positive bowel sounds dez po diet./Home

## 2021-08-12 NOTE — PROGRESS NOTE ADULT - ASSESSMENT
67 yo M s/p RALP/LND/repair of enterotomy 8/10/2021    POD#1 - dez sips; passed gas, diet advanced for dinner  POD #2 - tolerated reg diet, + flatus, pain controlled, ambulating      Plan:  - continue reg diet  - continue brice, monitor color  - brice/leg bag teaching, ? teach flushing  - f/u medicine  - DVT prophy, IS, OOB, ambulate  - d/c home later today

## 2021-08-12 NOTE — PROGRESS NOTE ADULT - SUBJECTIVE AND OBJECTIVE BOX
Overnight events:  None    Subjective:  Pt offers no complaints    Objective:    Vital signs  T(C): , Max: 37.1 (08-12-21 @ 01:19)  HR: 84 (08-12-21 @ 05:27)  BP: 143/75 (08-12-21 @ 05:27)  SpO2: 95% (08-12-21 @ 05:27)  Wt(kg): --    Output   Babs: 1530 maroon, no clots  08-11 @ 07:01  -  08-12 @ 07:00  --------------------------------------------------------  IN: 700 mL / OUT: 1860 mL / NET: -1160 mL        Gen: NAD  Abd: katia c/d/i, soft, nontender, nondistended  : babs secured    Labs: none today

## 2021-08-12 NOTE — PROGRESS NOTE ADULT - SUBJECTIVE AND OBJECTIVE BOX
PROGRESS NOTE:     Patient is a 66y old  Male who presents with a chief complaint of "I'm having a prostatectomy" (28 Jul 2021 09:15)      SUBJECTIVE / OVERNIGHT EVENTS: Pt w/ urinary retention, Lorenz flushed.     ADDITIONAL REVIEW OF SYSTEMS:    MEDICATIONS  (STANDING):  acetaminophen   Tablet .. 975 milliGRAM(s) Oral every 6 hours  aspirin enteric coated 81 milliGRAM(s) Oral daily  dextrose 5% + sodium chloride 0.45%. 1000 milliLiter(s) (75 mL/Hr) IV Continuous <Continuous>  heparin   Injectable 5000 Unit(s) SubCutaneous every 8 hours  simvastatin 20 milliGRAM(s) Oral at bedtime    MEDICATIONS  (PRN):  oxyCODONE    IR 5 milliGRAM(s) Oral every 6 hours PRN Severe Pain (7 - 10)  senna 2 Tablet(s) Oral at bedtime PRN Constipation      CAPILLARY BLOOD GLUCOSE        I&O's Summary    11 Aug 2021 07:01  -  12 Aug 2021 07:00  --------------------------------------------------------  IN: 700 mL / OUT: 1860 mL / NET: -1160 mL    12 Aug 2021 07:01  -  12 Aug 2021 13:36  --------------------------------------------------------  IN: 0 mL / OUT: 500 mL / NET: -500 mL        PHYSICAL EXAM:  Vital Signs Last 24 Hrs  T(C): 36.3 (12 Aug 2021 09:08), Max: 37.1 (12 Aug 2021 01:19)  T(F): 97.4 (12 Aug 2021 09:08), Max: 98.8 (12 Aug 2021 01:19)  HR: 88 (12 Aug 2021 09:08) (82 - 88)  BP: 124/75 (12 Aug 2021 09:08) (119/68 - 144/82)  BP(mean): --  RR: 16 (12 Aug 2021 09:08) (16 - 17)  SpO2: 100% (12 Aug 2021 09:08) (95% - 100%)    GENERAL: NAD  HEAD:  Atraumatic, Normocephalic  EYES: EOMI, PERRLA, conjunctiva and sclera clear  NECK: Supple, No JVD  CHEST/LUNG: Clear to auscultation bilaterally; No wheeze  HEART: Regular rate and rhythm; No murmurs, rubs, or gallops  ABDOMEN: Soft, appropriately tender, Nondistended; Bowel sounds present. Incisions c/d/i   : Lorenz w/ reddish urine   EXTREMITIES:  2+ Peripheral Pulses, No clubbing, cyanosis, or edema  PSYCH: AAOx3  NEUROLOGY: non-focal  SKIN: No rashes or lesions    LABS:                      RADIOLOGY & ADDITIONAL TESTS:  Results Reviewed:   Imaging Personally Reviewed:  Electrocardiogram Personally Reviewed:    COORDINATION OF CARE:  Care Discussed with Consultants/Other Providers [Y/N]:  Prior or Outpatient Records Reviewed [Y/N]:

## 2021-08-12 NOTE — DISCHARGE NOTE NURSING/CASE MANAGEMENT/SOCIAL WORK - PATIENT PORTAL LINK FT
You can access the FollowMyHealth Patient Portal offered by [facility] by registering at the following website: [email or web address]. By joining [employer]’s FollowMyHealth portal, you will also be able to view your health information using other applications (apps) compatible with our system.

## 2021-08-12 NOTE — DISCHARGE NOTE NURSING/CASE MANAGEMENT/SOCIAL WORK - NSDCPECAREGIVERED_GEN_ALL_CORE
Medline and carenotes for surgical procedure RALP, LND, as well as DC Medications and side effects literature for patient reference./Yes Medline and carenotes for surgical procedure as well as DC Medications and side effects literature for patient reference.  RALP, LND, Lorenz catheter Care, Tylenol/Yes

## 2021-08-12 NOTE — DISCHARGE NOTE NURSING/CASE MANAGEMENT/SOCIAL WORK - NSDCPNINST_GEN_ALL_CORE
Maintain abdominal incisions clean dry and intact, steri strips will fall off on their own in 1-2 weeks. Call MD with any signs of infection ie fever/shaking chills, redness or drainage, or with signs of bleeding or persistent nausea. Continue to drink plenty of fluids and avoid straining and constipation which may be a side effect from taking narcotic pain meds. No heavy lifting greater than 10 pounds (ie a gallon of milk.) Follow-up with MD as well as PMD in office as instructed for continuity of care post-operatively, as per safe DC plan.   Maintain abdominal incisions clean dry and intact, steri strips will fall off on their own in 1-2 weeks. Call MD with any signs of infection ie fever/shaking chills, redness or drainage, or with signs of bleeding or persistent nausea. Continue to drink plenty of fluids and avoid straining and constipation which may be a side effect from taking narcotic pain meds. No heavy lifting greater than 10 pounds (ie a gallon of milk.) Follow-up with MD as well as PMD in office as instructed for continuity of care post-operatively, as per safe DC plan.   Maintain Lorenz catheter to gravity drainage leg bag as instructed, remember hand washing and infection prevention measures in order to prevent catheter-related-urinary-tract-infections ("CAUTI")

## 2021-08-13 ENCOUNTER — TRANSCRIPTION ENCOUNTER (OUTPATIENT)
Age: 66
End: 2021-08-13

## 2021-08-13 VITALS
OXYGEN SATURATION: 97 % | SYSTOLIC BLOOD PRESSURE: 105 MMHG | TEMPERATURE: 99 F | HEART RATE: 95 BPM | RESPIRATION RATE: 19 BRPM | DIASTOLIC BLOOD PRESSURE: 58 MMHG

## 2021-08-13 DIAGNOSIS — D72.829 ELEVATED WHITE BLOOD CELL COUNT, UNSPECIFIED: ICD-10-CM

## 2021-08-13 DIAGNOSIS — R10.9 UNSPECIFIED ABDOMINAL PAIN: ICD-10-CM

## 2021-08-13 DIAGNOSIS — E87.1 HYPO-OSMOLALITY AND HYPONATREMIA: ICD-10-CM

## 2021-08-13 DIAGNOSIS — D62 ACUTE POSTHEMORRHAGIC ANEMIA: ICD-10-CM

## 2021-08-13 LAB
ANION GAP SERPL CALC-SCNC: 12 MMOL/L — SIGNIFICANT CHANGE UP (ref 7–14)
BUN SERPL-MCNC: 23 MG/DL — SIGNIFICANT CHANGE UP (ref 7–23)
CALCIUM SERPL-MCNC: 8.5 MG/DL — SIGNIFICANT CHANGE UP (ref 8.4–10.5)
CHLORIDE SERPL-SCNC: 99 MMOL/L — SIGNIFICANT CHANGE UP (ref 98–107)
CO2 SERPL-SCNC: 22 MMOL/L — SIGNIFICANT CHANGE UP (ref 22–31)
CREAT SERPL-MCNC: 0.84 MG/DL — SIGNIFICANT CHANGE UP (ref 0.5–1.3)
GLUCOSE SERPL-MCNC: 134 MG/DL — HIGH (ref 70–99)
HCT VFR BLD CALC: 29 % — LOW (ref 39–50)
HGB BLD-MCNC: 10.1 G/DL — LOW (ref 13–17)
MAGNESIUM SERPL-MCNC: 1.7 MG/DL — SIGNIFICANT CHANGE UP (ref 1.6–2.6)
MCHC RBC-ENTMCNC: 32.9 PG — SIGNIFICANT CHANGE UP (ref 27–34)
MCHC RBC-ENTMCNC: 34.8 GM/DL — SIGNIFICANT CHANGE UP (ref 32–36)
MCV RBC AUTO: 94.5 FL — SIGNIFICANT CHANGE UP (ref 80–100)
NRBC # BLD: 0 /100 WBCS — SIGNIFICANT CHANGE UP
NRBC # FLD: 0 K/UL — SIGNIFICANT CHANGE UP
PHOSPHATE SERPL-MCNC: 2.2 MG/DL — LOW (ref 2.5–4.5)
PLATELET # BLD AUTO: 200 K/UL — SIGNIFICANT CHANGE UP (ref 150–400)
POTASSIUM SERPL-MCNC: 4.5 MMOL/L — SIGNIFICANT CHANGE UP (ref 3.5–5.3)
POTASSIUM SERPL-SCNC: 4.5 MMOL/L — SIGNIFICANT CHANGE UP (ref 3.5–5.3)
RBC # BLD: 3.07 M/UL — LOW (ref 4.2–5.8)
RBC # FLD: 12 % — SIGNIFICANT CHANGE UP (ref 10.3–14.5)
SODIUM SERPL-SCNC: 133 MMOL/L — LOW (ref 135–145)
WBC # BLD: 12.67 K/UL — HIGH (ref 3.8–10.5)
WBC # FLD AUTO: 12.67 K/UL — HIGH (ref 3.8–10.5)

## 2021-08-13 PROCEDURE — 99232 SBSQ HOSP IP/OBS MODERATE 35: CPT

## 2021-08-13 RX ORDER — DOCUSATE SODIUM 100 MG
1 CAPSULE ORAL
Qty: 0 | Refills: 0 | DISCHARGE

## 2021-08-13 RX ORDER — SODIUM CHLORIDE 9 MG/ML
1000 INJECTION, SOLUTION INTRAVENOUS
Refills: 0 | Status: DISCONTINUED | OUTPATIENT
Start: 2021-08-13 | End: 2021-08-13

## 2021-08-13 RX ORDER — ACETAMINOPHEN 500 MG
3 TABLET ORAL
Qty: 0 | Refills: 0 | DISCHARGE
Start: 2021-08-13

## 2021-08-13 RX ORDER — SENNA PLUS 8.6 MG/1
2 TABLET ORAL
Qty: 0 | Refills: 0 | DISCHARGE
Start: 2021-08-13

## 2021-08-13 RX ADMIN — HEPARIN SODIUM 5000 UNIT(S): 5000 INJECTION INTRAVENOUS; SUBCUTANEOUS at 05:31

## 2021-08-13 RX ADMIN — Medication 81 MILLIGRAM(S): at 11:57

## 2021-08-13 RX ADMIN — Medication 30 MILLILITER(S): at 05:31

## 2021-08-13 RX ADMIN — HEPARIN SODIUM 5000 UNIT(S): 5000 INJECTION INTRAVENOUS; SUBCUTANEOUS at 14:16

## 2021-08-13 RX ADMIN — SODIUM CHLORIDE 75 MILLILITER(S): 9 INJECTION, SOLUTION INTRAVENOUS at 07:22

## 2021-08-13 NOTE — DISCHARGE NOTE PROVIDER - CARE PROVIDERS DIRECT ADDRESSES
[email or web address]

## 2021-08-13 NOTE — PROGRESS NOTE ADULT - PROBLEM SELECTOR PLAN 5
Not on any inhalers   Former smoker   Outpatient Pulm f/up. Likely reactive d/t recent surgery   No other signs or symptoms of infection   Stable off antibiotics

## 2021-08-13 NOTE — PROGRESS NOTE ADULT - PROBLEM SELECTOR PLAN 1
s/p robotic assisted radical prostatectomy w/ pelvic lymph node dissection on 8/10  Plan to d/c w/ Lorenz   Pain control   Management as per .
s/p robotic assisted radical prostatectomy w/ pelvic lymph node dissection on 8/10  Plan to d/c w/ Lorenz   Pain control   Management as per .

## 2021-08-13 NOTE — DISCHARGE NOTE PROVIDER - HOSPITAL COURSE
67 yo M s/p RALP/LND/repair of enterotomy 8/10/2021    POD#1 - tolerated sips; passed gas, diet advanced for dinner  POD #2 - tolerated reg diet, + flatus, pain controlled, ambulating, urine maroon, irrigated to light punch minimal clot  POD #3 - small volume emesis (per RN small amount of phlegm) and episode of diarrhea, improved, formed stool, no further N/V, tolerating regular diet, pain controlled, ambulating, urine remained clear yellow.  Pt d/c with brice to leg bag to f/u with Dr. Scott's office on 8/18     67 yo M s/p RALP/LND/repair of enterotomy 8/10/2021    POD#1 - tolerated sips; passed gas, diet advanced for dinner  POD #2 - tolerated reg diet, + flatus, pain controlled, ambulating, urine maroon, irrigated to light punch minimal clot  POD #3 - small volume emesis (per RN small amount of phlegm, not emesis) and episode of diarrhea, improved, no further N/V, tolerating regular diet, pain controlled, ambulating, urine remained clear yellow.  Pt d/c with brice to leg bag to f/u with Dr. Soctt's office on 8/18

## 2021-08-13 NOTE — PROGRESS NOTE ADULT - SUBJECTIVE AND OBJECTIVE BOX
Overnight events:  Pt had small volume emesis, one episode of diarrhea    Subjective:  Pt states no pain, still passing gas, had one episode of small volume green emesis and episode of diarrhea, states burping    Objective:    Vital signs  T(C): , Max: 36.9 (08-13-21 @ 05:29)  HR: 98 (08-13-21 @ 05:29)  BP: 137/81 (08-13-21 @ 05:29)  SpO2: 98% (08-13-21 @ 05:29)  Wt(kg): --    Output   Babs: 575 yellow  08-11 @ 07:01  -  08-12 @ 07:00  --------------------------------------------------------  IN: 700 mL / OUT: 1860 mL / NET: -1160 mL    08-12 @ 07:01  -  08-13 @ 06:53  --------------------------------------------------------  IN: 700 mL / OUT: 2475 mL / NET: -1775 mL        Gen: NAD  Abd: aktia c/d/i, very softly distended, nontender  : babs secured    Labs: pending           Overnight events:  Pt had small volume emesis, (per RN small amount of phlegm in cup) one episode of diarrhea    Subjective:  Pt states no pain, still passing gas, had one episode of small volume green emesis (per RN small amount of phlegm) and episode of diarrhea, states burping    Objective:    Vital signs  T(C): , Max: 36.9 (08-13-21 @ 05:29)  HR: 98 (08-13-21 @ 05:29)  BP: 137/81 (08-13-21 @ 05:29)  SpO2: 98% (08-13-21 @ 05:29)  Wt(kg): --    Output   Babs: 575 yellow  08-11 @ 07:01  -  08-12 @ 07:00  --------------------------------------------------------  IN: 700 mL / OUT: 1860 mL / NET: -1160 mL    08-12 @ 07:01  -  08-13 @ 06:53  --------------------------------------------------------  IN: 700 mL / OUT: 2475 mL / NET: -1775 mL        Gen: NAD  Abd: katia c/d/i, very softly distended, nontender  : babs secured    Labs: pending

## 2021-08-13 NOTE — DISCHARGE NOTE PROVIDER - NSDCFUSCHEDAPPT_GEN_ALL_CORE_FT
ZOË KONG ; 08/18/2021 ; NPP Urology [address] [facility]  ZOË KONG ; 10/04/2021 ; NPP OtoLaryng [address] [facility]  ZOË KONG ; 10/18/2021 ; NPP [facility] [address]

## 2021-08-13 NOTE — PROGRESS NOTE ADULT - PROBLEM SELECTOR PLAN 3
Episode of vomiting, gas, and diarrhea likely d/t indigestion  Symptoms improved w/ Maalox   Continue w/ regular diet   Maalox prn   Avoid taking pills on an empty stomach   Monitor for symptoms
Continue statin.

## 2021-08-13 NOTE — PROGRESS NOTE ADULT - ASSESSMENT
65 yo M s/p RALP/LND/repair of enterotomy 8/10/2021    POD#1 - dez sips; passed gas, diet advanced for dinner  POD #2 - tolerated reg diet, + flatus, pain controlled, ambulating, urine maroon  POD #3 - urine yellow, small volume emesis and episode of diarrhea      Plan:  - f/u AM labs  - NPO with sips/chips  - monitor GI function  - continue brice, monitor color  - brice/leg bag teaching  - f/u medicine  - DVT prophy, IS, OOB, ambulate     65 yo M s/p RALP/LND/repair of enterotomy 8/10/2021    POD#1 - dez sips; passed gas, diet advanced for dinner  POD #2 - tolerated reg diet, + flatus, pain controlled, ambulating, urine maroon  POD #3 - urine yellow, small volume emesis (per RN small amount of phlegm) and episode of diarrhea      Plan:  - f/u AM labs  - NPO with sips/chips  - monitor GI function  - continue brice, monitor color  - brice/leg bag teaching  - f/u medicine  - DVT prophy, IS, OOB, ambulate

## 2021-08-13 NOTE — DISCHARGE NOTE PROVIDER - NSDCMRMEDTOKEN_GEN_ALL_CORE_FT
acetaminophen 325 mg oral tablet: 3 tab(s) orally every 6 hours as needed for pain  aspirin 81 mg oral tablet: 1 tab(s) orally once a day. last dose 8/2/21  Colace 100 mg oral capsule: 1 tab(s) orally once a day as needed  senna oral tablet: 2 tab(s) orally once a day (at bedtime), As needed, Constipation  simvastatin 20 mg oral tablet: 1 tab(s) orally once a day (at bedtime)

## 2021-08-13 NOTE — DISCHARGE NOTE PROVIDER - NSDCCPCAREPLAN_GEN_ALL_CORE_FT
PRINCIPAL DISCHARGE DIAGNOSIS  Diagnosis: Malignant neoplasm of prostate  Assessment and Plan of Treatment: Empty brice bag as needed as instructed.  Keep well hydrated.  No heavy lifting or straining for 4 to 6 weeks, avoid constipation. You may have intermittent pink tinged urine and small amounts of leakage around brice (due to bladder spasms).  This is normal.   If your urine becomes bright red or with clots, or there is no urine in the bag, please call the office. You may shower, just pat white strips dry, they will fall off in a few weeks.   Keep your appointment as scheduled with Dr. Scott's office on 8/18 for brice removal and further management.  Call the office if you have fever greater than 101, no urine in bag, pain not relieved with pain medication, nausea/vomiting.

## 2021-08-13 NOTE — PROGRESS NOTE ADULT - PROBLEM SELECTOR PROBLEM 2
Injury to other and unspecified gastrointestinal sites
Injury to other and unspecified gastrointestinal sites

## 2021-08-13 NOTE — PROGRESS NOTE ADULT - SUBJECTIVE AND OBJECTIVE BOX
PROGRESS NOTE:     Patient is a 66y old  Male who presents with a chief complaint of "I'm having a prostatectomy" (28 Jul 2021 09:15)      SUBJECTIVE / OVERNIGHT EVENTS: Pt w/ episode of vomiting and one watery bowel movement last night. He states he felt very gassy prior to vomiting. Maalox was given this AM and improved symptoms.     ADDITIONAL REVIEW OF SYSTEMS:    MEDICATIONS  (STANDING):  acetaminophen   Tablet .. 975 milliGRAM(s) Oral every 6 hours  aspirin enteric coated 81 milliGRAM(s) Oral daily  heparin   Injectable 5000 Unit(s) SubCutaneous every 8 hours  simvastatin 20 milliGRAM(s) Oral at bedtime    MEDICATIONS  (PRN):  aluminum hydroxide/magnesium hydroxide/simethicone Suspension 30 milliLiter(s) Oral every 4 hours PRN Dyspepsia  melatonin 3 milliGRAM(s) Oral at bedtime PRN Insomnia  oxyCODONE    IR 5 milliGRAM(s) Oral every 6 hours PRN Severe Pain (7 - 10)  senna 2 Tablet(s) Oral at bedtime PRN Constipation      CAPILLARY BLOOD GLUCOSE        I&O's Summary    12 Aug 2021 07:01  -  13 Aug 2021 07:00  --------------------------------------------------------  IN: 700 mL / OUT: 2475 mL / NET: -1775 mL    13 Aug 2021 07:01  -  13 Aug 2021 12:22  --------------------------------------------------------  IN: 0 mL / OUT: 300 mL / NET: -300 mL        PHYSICAL EXAM:  Vital Signs Last 24 Hrs  T(C): 37.2 (13 Aug 2021 09:57), Max: 37.2 (13 Aug 2021 09:57)  T(F): 99 (13 Aug 2021 09:57), Max: 99 (13 Aug 2021 09:57)  HR: 97 (13 Aug 2021 09:57) (87 - 100)  BP: 122/76 (13 Aug 2021 09:57) (122/76 - 155/83)  BP(mean): --  RR: 17 (13 Aug 2021 09:57) (16 - 17)  SpO2: 96% (13 Aug 2021 09:57) (96% - 100%)    GENERAL: NAD  EYES: EOMI, PERRLA, conjunctiva and sclera clear  NECK: Supple, No JVD  CHEST/LUNG: Clear to auscultation bilaterally; No wheeze  HEART: Regular rate and rhythm; No murmurs, rubs, or gallops  ABDOMEN: Soft, nontender, nondistended; Bowel sounds present. Incisions c/d/i   : Lorenz w/ yellow urine   EXTREMITIES:  2+ Peripheral Pulses, No clubbing, cyanosis, or edema  PSYCH: AAOx3  NEUROLOGY: non-focal  SKIN: No rashes or lesions    LABS:                        10.1   12.67 )-----------( 200      ( 13 Aug 2021 08:25 )             29.0     08-13    133<L>  |  99  |  23  ----------------------------<  134<H>  4.5   |  22  |  0.84    Ca    8.5      13 Aug 2021 08:25  Phos  2.2     08-13  Mg     1.70     08-13                  RADIOLOGY & ADDITIONAL TESTS:  Results Reviewed:   Imaging Personally Reviewed:  Electrocardiogram Personally Reviewed:    COORDINATION OF CARE:  Care Discussed with Consultants/Other Providers [Y/N]:  Prior or Outpatient Records Reviewed [Y/N]:

## 2021-08-13 NOTE — DISCHARGE NOTE PROVIDER - CARE PROVIDER_API CALL
Jorge Luis Scott)  Urology  [address] [place] [address], [place] [address]  Phone: [phone]  Fax: [phone]  Follow Up Time:    Jorge Luis Scott)  Urology  [address] [facility], [address]  [place] [address]  Phone: [phone]  Fax: [phone]  Follow Up Time:     Aramis Bradford  INTERNAL MEDICINE  [address] [facility], [address]  [place] [address]  Phone: [phone]  Fax: [phone]  Follow Up Time:

## 2021-08-13 NOTE — PROGRESS NOTE ADULT - PROBLEM SELECTOR PLAN 2
Sharp small bowel enterotomy on initial entry into the abdomen, s/p repair  Diet advanced and tolerated
Sharp small bowel enterotomy on initial entry into the abdomen, s/p repair  Diet advanced

## 2021-08-16 LAB — SURGICAL PATHOLOGY STUDY: SIGNIFICANT CHANGE UP

## 2021-08-18 ENCOUNTER — APPOINTMENT (OUTPATIENT)
Dept: UROLOGY | Facility: CLINIC | Age: 66
End: 2021-08-18
Payer: COMMERCIAL

## 2021-08-18 VITALS
HEART RATE: 101 BPM | TEMPERATURE: 98 F | WEIGHT: 170 LBS | BODY MASS INDEX: 23.03 KG/M2 | HEIGHT: 72 IN | DIASTOLIC BLOOD PRESSURE: 59 MMHG | SYSTOLIC BLOOD PRESSURE: 99 MMHG | RESPIRATION RATE: 16 BRPM

## 2021-08-18 PROCEDURE — 99024 POSTOP FOLLOW-UP VISIT: CPT

## 2021-08-18 NOTE — HISTORY OF PRESENT ILLNESS
[FreeTextEntry1] : Patient is a 66 year old man. Status post single port  robotic radical prostatectomy PLND, 8/10/2021. \par PATH: [id number], margins negative, [place] 7 (4+3)\par \par Incisions to abdomen x 2 Steri-Strips. clean and intact. Denies pain. \par Ecchymosis noted to lower abdomen and bilateral groin. No swelling or hematoma noted. Has reports of diarrhea for a few days. Denies any nausea, denies vomiting. \par \par Indwelling Lorenz in placed, urine in drainage bag clear. Here today for catheter removal.

## 2021-08-18 NOTE — ASSESSMENT
[FreeTextEntry1] : Indwelling Lorenz removed without difficulty. Discussed the importance of Kegel exercises and pelvic floor physical therapy to regain urinary control. \par Discussed BRAT diet to help with diarrhea. Patient reports he is using Citrucel and Colace daily as advised by his gastroenterologist. Advised to hold till diarrhea subsides. Contact gastroenterologist for follow up. \par Will follow up with Dr. Scott in six weeks. At that visit will have first postoperative PSA level done.

## 2021-08-18 NOTE — PHYSICAL EXAM
[General Appearance - Well Developed] : well developed [General Appearance - Well Nourished] : well nourished [Normal Appearance] : normal appearance [Well Groomed] : well groomed [General Appearance - In No Acute Distress] : no acute distress [Abdomen Soft] : soft [Abdomen Tenderness] : non-tender [Costovertebral Angle Tenderness] : no ~M costovertebral angle tenderness [Urethral Meatus] : meatus normal [Urinary Bladder Findings] : the bladder was normal on palpation [Scrotum] : the scrotum was normal [Testes Mass (___cm)] : there were no testicular masses [Edema] : no peripheral edema [] : no respiratory distress [Respiration, Rhythm And Depth] : normal respiratory rhythm and effort [Exaggerated Use Of Accessory Muscles For Inspiration] : no accessory muscle use [Oriented To Time, Place, And Person] : oriented to person, place, and time [Affect] : the affect was normal [Mood] : the mood was normal [Not Anxious] : not anxious [Normal Station and Gait] : the gait and station were normal for the patient's age [No Focal Deficits] : no focal deficits [No Palpable Adenopathy] : no palpable adenopathy [FreeTextEntry1] : ecchymosis to lower abdomen and bilateral groin. Incision to left lower quadrant intact, incision to mid abdomen intact

## 2021-08-22 ENCOUNTER — NON-APPOINTMENT (OUTPATIENT)
Age: 66
End: 2021-08-22

## 2021-08-23 ENCOUNTER — INPATIENT (INPATIENT)
Facility: HOSPITAL | Age: 66
LOS: 2 days | Discharge: HOME | End: 2021-08-26
Attending: UROLOGY | Admitting: UROLOGY
Payer: COMMERCIAL

## 2021-08-23 VITALS
DIASTOLIC BLOOD PRESSURE: 65 MMHG | HEIGHT: 72 IN | HEART RATE: 105 BPM | RESPIRATION RATE: 16 BRPM | OXYGEN SATURATION: 100 % | TEMPERATURE: 99 F | SYSTOLIC BLOOD PRESSURE: 112 MMHG

## 2021-08-23 DIAGNOSIS — Z92.3 PERSONAL HISTORY OF IRRADIATION: Chronic | ICD-10-CM

## 2021-08-23 DIAGNOSIS — R50.9 FEVER, UNSPECIFIED: ICD-10-CM

## 2021-08-23 DIAGNOSIS — Z98.890 OTHER SPECIFIED POSTPROCEDURAL STATES: Chronic | ICD-10-CM

## 2021-08-23 DIAGNOSIS — Z92.21 PERSONAL HISTORY OF ANTINEOPLASTIC CHEMOTHERAPY: Chronic | ICD-10-CM

## 2021-08-23 LAB
ALBUMIN SERPL ELPH-MCNC: 3.5 G/DL — SIGNIFICANT CHANGE UP (ref 3.3–5)
ALP SERPL-CCNC: 96 U/L — SIGNIFICANT CHANGE UP (ref 40–120)
ALT FLD-CCNC: 49 U/L — HIGH (ref 4–41)
ANION GAP SERPL CALC-SCNC: 14 MMOL/L — SIGNIFICANT CHANGE UP (ref 7–14)
APPEARANCE UR: ABNORMAL
AST SERPL-CCNC: 49 U/L — HIGH (ref 4–40)
BASOPHILS # BLD AUTO: 0.04 K/UL — SIGNIFICANT CHANGE UP (ref 0–0.2)
BASOPHILS NFR BLD AUTO: 0.3 % — SIGNIFICANT CHANGE UP (ref 0–2)
BILIRUB SERPL-MCNC: 1.3 MG/DL — HIGH (ref 0.2–1.2)
BILIRUB UR-MCNC: ABNORMAL
BLOOD GAS VENOUS COMPREHENSIVE RESULT: SIGNIFICANT CHANGE UP
BUN SERPL-MCNC: 18 MG/DL — SIGNIFICANT CHANGE UP (ref 7–23)
CALCIUM SERPL-MCNC: 8.7 MG/DL — SIGNIFICANT CHANGE UP (ref 8.4–10.5)
CHLORIDE SERPL-SCNC: 91 MMOL/L — LOW (ref 98–107)
CO2 SERPL-SCNC: 21 MMOL/L — LOW (ref 22–31)
COLOR SPEC: ABNORMAL
CREAT SERPL-MCNC: 0.95 MG/DL — SIGNIFICANT CHANGE UP (ref 0.5–1.3)
DIFF PNL FLD: ABNORMAL
EOSINOPHIL # BLD AUTO: 0.07 K/UL — SIGNIFICANT CHANGE UP (ref 0–0.5)
EOSINOPHIL NFR BLD AUTO: 0.4 % — SIGNIFICANT CHANGE UP (ref 0–6)
GLUCOSE SERPL-MCNC: 110 MG/DL — HIGH (ref 70–99)
GLUCOSE UR QL: NEGATIVE — SIGNIFICANT CHANGE UP
HCT VFR BLD CALC: 25.9 % — LOW (ref 39–50)
HGB BLD-MCNC: 8.4 G/DL — LOW (ref 13–17)
IANC: 13.97 K/UL — HIGH (ref 1.5–8.5)
IMM GRANULOCYTES NFR BLD AUTO: 0.8 % — SIGNIFICANT CHANGE UP (ref 0–1.5)
KETONES UR-MCNC: NEGATIVE — SIGNIFICANT CHANGE UP
LEUKOCYTE ESTERASE UR-ACNC: ABNORMAL
LYMPHOCYTES # BLD AUTO: 0.41 K/UL — LOW (ref 1–3.3)
LYMPHOCYTES # BLD AUTO: 2.6 % — LOW (ref 13–44)
MCHC RBC-ENTMCNC: 32.4 GM/DL — SIGNIFICANT CHANGE UP (ref 32–36)
MCHC RBC-ENTMCNC: 32.6 PG — SIGNIFICANT CHANGE UP (ref 27–34)
MCV RBC AUTO: 100.4 FL — HIGH (ref 80–100)
MONOCYTES # BLD AUTO: 1.05 K/UL — HIGH (ref 0–0.9)
MONOCYTES NFR BLD AUTO: 6.7 % — SIGNIFICANT CHANGE UP (ref 2–14)
NEUTROPHILS # BLD AUTO: 13.97 K/UL — HIGH (ref 1.8–7.4)
NEUTROPHILS NFR BLD AUTO: 89.2 % — HIGH (ref 43–77)
NITRITE UR-MCNC: NEGATIVE — SIGNIFICANT CHANGE UP
NRBC # BLD: 0 /100 WBCS — SIGNIFICANT CHANGE UP
NRBC # FLD: 0 K/UL — SIGNIFICANT CHANGE UP
PH UR: 6 — SIGNIFICANT CHANGE UP (ref 5–8)
PLATELET # BLD AUTO: 536 K/UL — HIGH (ref 150–400)
POTASSIUM SERPL-MCNC: 3.9 MMOL/L — SIGNIFICANT CHANGE UP (ref 3.5–5.3)
POTASSIUM SERPL-SCNC: 3.9 MMOL/L — SIGNIFICANT CHANGE UP (ref 3.5–5.3)
PROT SERPL-MCNC: 6.5 G/DL — SIGNIFICANT CHANGE UP (ref 6–8.3)
PROT UR-MCNC: ABNORMAL
RBC # BLD: 2.58 M/UL — LOW (ref 4.2–5.8)
RBC # FLD: 15.6 % — HIGH (ref 10.3–14.5)
SARS-COV-2 RNA SPEC QL NAA+PROBE: SIGNIFICANT CHANGE UP
SODIUM SERPL-SCNC: 126 MMOL/L — LOW (ref 135–145)
SP GR SPEC: 1.02 — SIGNIFICANT CHANGE UP (ref 1.01–1.02)
UROBILINOGEN FLD QL: ABNORMAL
WBC # BLD: 15.67 K/UL — HIGH (ref 3.8–10.5)
WBC # FLD AUTO: 15.67 K/UL — HIGH (ref 3.8–10.5)

## 2021-08-23 PROCEDURE — 74177 CT ABD & PELVIS W/CONTRAST: CPT | Mod: 26

## 2021-08-23 PROCEDURE — 71260 CT THORAX DX C+: CPT | Mod: 26

## 2021-08-23 PROCEDURE — 99285 EMERGENCY DEPT VISIT HI MDM: CPT

## 2021-08-23 PROCEDURE — 71045 X-RAY EXAM CHEST 1 VIEW: CPT | Mod: 26

## 2021-08-23 RX ORDER — CEFTRIAXONE 500 MG/1
1000 INJECTION, POWDER, FOR SOLUTION INTRAMUSCULAR; INTRAVENOUS EVERY 24 HOURS
Refills: 0 | Status: DISCONTINUED | OUTPATIENT
Start: 2021-08-23 | End: 2021-08-23

## 2021-08-23 RX ORDER — SODIUM CHLORIDE 9 MG/ML
1000 INJECTION, SOLUTION INTRAVENOUS
Refills: 0 | Status: DISCONTINUED | OUTPATIENT
Start: 2021-08-23 | End: 2021-08-25

## 2021-08-23 RX ORDER — CEFTRIAXONE 500 MG/1
1000 INJECTION, POWDER, FOR SOLUTION INTRAMUSCULAR; INTRAVENOUS EVERY 24 HOURS
Refills: 0 | Status: DISCONTINUED | OUTPATIENT
Start: 2021-08-24 | End: 2021-08-26

## 2021-08-23 RX ORDER — SODIUM CHLORIDE 9 MG/ML
1000 INJECTION, SOLUTION INTRAVENOUS ONCE
Refills: 0 | Status: COMPLETED | OUTPATIENT
Start: 2021-08-23 | End: 2021-08-23

## 2021-08-23 RX ORDER — PIPERACILLIN AND TAZOBACTAM 4; .5 G/20ML; G/20ML
3.38 INJECTION, POWDER, LYOPHILIZED, FOR SOLUTION INTRAVENOUS ONCE
Refills: 0 | Status: DISCONTINUED | OUTPATIENT
Start: 2021-08-23 | End: 2021-08-23

## 2021-08-23 RX ORDER — HEPARIN SODIUM 5000 [USP'U]/ML
5000 INJECTION INTRAVENOUS; SUBCUTANEOUS EVERY 8 HOURS
Refills: 0 | Status: DISCONTINUED | OUTPATIENT
Start: 2021-08-23 | End: 2021-08-26

## 2021-08-23 RX ORDER — CEFTRIAXONE 500 MG/1
1000 INJECTION, POWDER, FOR SOLUTION INTRAMUSCULAR; INTRAVENOUS ONCE
Refills: 0 | Status: COMPLETED | OUTPATIENT
Start: 2021-08-23 | End: 2021-08-23

## 2021-08-23 RX ORDER — DOCUSATE SODIUM 100 MG
1 CAPSULE ORAL
Qty: 0 | Refills: 0 | DISCHARGE

## 2021-08-23 RX ORDER — METRONIDAZOLE 500 MG
500 TABLET ORAL ONCE
Refills: 0 | Status: COMPLETED | OUTPATIENT
Start: 2021-08-23 | End: 2021-08-23

## 2021-08-23 RX ORDER — ACETAMINOPHEN 500 MG
975 TABLET ORAL EVERY 6 HOURS
Refills: 0 | Status: DISCONTINUED | OUTPATIENT
Start: 2021-08-23 | End: 2021-08-26

## 2021-08-23 RX ORDER — ASPIRIN/CALCIUM CARB/MAGNESIUM 324 MG
1 TABLET ORAL
Qty: 0 | Refills: 0 | DISCHARGE

## 2021-08-23 RX ORDER — VANCOMYCIN HCL 1 G
1000 VIAL (EA) INTRAVENOUS ONCE
Refills: 0 | Status: COMPLETED | OUTPATIENT
Start: 2021-08-23 | End: 2021-08-23

## 2021-08-23 RX ORDER — ASPIRIN/CALCIUM CARB/MAGNESIUM 324 MG
81 TABLET ORAL DAILY
Refills: 0 | Status: DISCONTINUED | OUTPATIENT
Start: 2021-08-23 | End: 2021-08-26

## 2021-08-23 RX ORDER — SIMVASTATIN 20 MG/1
20 TABLET, FILM COATED ORAL AT BEDTIME
Refills: 0 | Status: DISCONTINUED | OUTPATIENT
Start: 2021-08-23 | End: 2021-08-26

## 2021-08-23 RX ADMIN — HEPARIN SODIUM 5000 UNIT(S): 5000 INJECTION INTRAVENOUS; SUBCUTANEOUS at 22:30

## 2021-08-23 RX ADMIN — CEFTRIAXONE 100 MILLIGRAM(S): 500 INJECTION, POWDER, FOR SOLUTION INTRAMUSCULAR; INTRAVENOUS at 14:49

## 2021-08-23 RX ADMIN — Medication 250 MILLIGRAM(S): at 20:13

## 2021-08-23 RX ADMIN — SODIUM CHLORIDE 1000 MILLILITER(S): 9 INJECTION, SOLUTION INTRAVENOUS at 13:53

## 2021-08-23 RX ADMIN — SIMVASTATIN 20 MILLIGRAM(S): 20 TABLET, FILM COATED ORAL at 22:30

## 2021-08-23 RX ADMIN — SODIUM CHLORIDE 1000 MILLILITER(S): 9 INJECTION, SOLUTION INTRAVENOUS at 11:49

## 2021-08-23 RX ADMIN — SODIUM CHLORIDE 75 MILLILITER(S): 9 INJECTION, SOLUTION INTRAVENOUS at 18:33

## 2021-08-23 RX ADMIN — Medication 100 MILLIGRAM(S): at 18:33

## 2021-08-23 NOTE — ED PROVIDER NOTE - ATTENDING CONTRIBUTION TO CARE
66M w/ recent prostatectomy p/w lower abdominal pain and intermittent fevers. Denies n/v. Reports loose stools. States he is passing gas. tender lower abdomen bilaterally. Otherwise appears well, no hypotension or signs of septic shock, will ct abd/pel r/o abscess from surgery, urology consult

## 2021-08-23 NOTE — H&P ADULT - NSHPPHYSICALEXAM_GEN_ALL_CORE
Physical Exam:    Gen: In no acute distress  Resp: No additional work of breathing   GI:mildly distended, tender to deep palpation over lower abdomen, diffuse bruising over abdomen and groin.  Incisions well approximated wit hsteri-strips in place  MSK: Moves all extremities equally  Skin: No rashes

## 2021-08-23 NOTE — PATIENT PROFILE ADULT - VISION (WITH CORRECTIVE LENSES IF THE PATIENT USUALLY WEARS THEM):
Telephone Encounter by Ladi Medina at 05/08/17 02:12 PM     Author:  Ladi Medina Service:  (none) Author Type:  Patient      Filed:  05/08/17 02:15 PM Encounter Date:  5/8/2017 Status:  Signed     :  Ladi Medina (Patient )              ANITHA GONCALVES    Patient Age: 64 year old   Refill request by:[SH1.1T] Phone.  Patient wants a call back? Yes -  Ok to leave response (including medical information) on answering machine[SH1.1M]  Refill to be:[SH1.1T] ePrescribed to[SH1.1M]      [facility] [address][SH1.2T]    Medication requested to be refilled:[SH1.1T]   Requested Prescriptions     Pending Prescriptions Disp Refills   • metoprolol (TOPROL-XL) 100 MG 24 hr tablet 90 Each 0[SH1.2T]     Pt states 'she has 10 pills left'.  Pt is requesting a call when sent to the pharmacy.[SH1.1M]  Routed to provider's clinical pool.[SH1.1T]  Lao SPEAKING ONLY[SH1.1M]      Next and Last Visit with Provider and Department  Next visit with ALMAS KENNEY is on 06/19/2017 at  3:45 PM in INTERNAL MEDICINE SEQ  Next visit with INTERNAL MEDICINE is on 06/19/2017 at  3:45 PM in INTERNAL MEDICINE SEQ   Last visit with ALMAS KENNEY was on 03/13/2017 at  4:25 PM in INTERNAL MEDICINE SEQ  Last visit with INTERNAL MEDICINE was on 03/27/2017 at  2:30 PM in INTERNAL MEDICINE SEQ      WEIGHT AND HEIGHT: As of 03/27/2017 weight is 250 lbs.(113.399 kg).   BMI is 40.05 kg/(m^2) calculated from:     Height 5' 2\" (1.575 m) as of 3/13/17     Weight 219 lb (99.338 kg) as of 3/13/17[SH1.1T]      Allergies     Allergen  Reactions   • Fish Oil Swelling[SH1.2T]     Current outpatient prescriptions       Medication  Sig Dispense Refill   • hydrochlorothiazide (HYDRODIURIL) 25 MG tablet [place] 1 tableta por la boca  diario 90 Tab 1   • levothyroxine 112 MCG tablet Take 1 tablet by mouth  daily 90 Tab 1   • ezetimibe (ZETIA) 10 MG tablet Take 1  Tab by mouth daily. 90 Tab 0   • albuterol (PROVENTIL) (2.5 MG/3ML) 0.083% nebulizer solution Inhale 3 mL by mouth every 6 (six) hours as needed for Wheezing. 75 Vial 11   • promethazine-codeine (PHENERGAN WITH CODEINE) 6.25-10 MG/5ML syrup Take 5 mL by mouth every 6 (six) hours as needed for Cough. 120 mL 0   • metformin (GLUCOPHAGE) 500 MG tablet 1 tab in AM,2 Tabs in  Tab 1   • albuterol (PROAIR HFA) 108 (90 BASE) MCG/ACT inhaler Inhale 2 Puffs by mouth every 6 (six) hours as needed for Wheezing. 1 Inhaler 0   • metoprolol (TOPROL-XL) 100 MG 24 hr tablet [place] 1 tableta por la boca  diario 90 Each 0   • DIABETIC TESTING METER Select based on [facility], patient or insurance preference. Test 2X dailyDx: DM TYPE II-UNCOMPL E11.9 1 Each 0   • fluticasone (FLONASE) 50 MCG/ACT nasal spray Use 1 Spray in each nostril daily. 16 g 12        ROUTING:[SH1.1T] Patient's physician/staff[SH1.1M]        PCP: Lovely Pina MD         INS: Payor: [employer] PPO / Plan: *No Plan* / Product Type: *No Product type* / Note: This is the primary coverage, but no account was found for this location or the patient's primary location.   ADDRESS:  [address] [place] [address][1.1T]       Revision History        User Key Date/Time User Provider Type Action    > SH1.2 05/08/17 02:15 PM Ladi Medina Patient  Sign     SH1.1 05/08/17 02:12 PM Ladi Medina Patient      M - Manual, T - Template             Normal vision: sees adequately in most situations; can see medication labels, newsprint

## 2021-08-23 NOTE — ED ADULT NURSE NOTE - OBJECTIVE STATEMENT
Received pt to bed 4, A+Ox4, ambulatory. C/O suprapubic pain x 2 weeks, pt states pain and bruising began 3 days post laparoscopic prostate surgery on 8/10/21. bruising noted to the suprapubic region as well as in the lower quadrants, incisions appear free of infections. pt also endorses intermittent constipation and diarrhea. Pt denies any chest pain, SOB, headache, dizziness, N/V/D, fever, chills. Pt denies dysuria, hematuria, melena, hematemesis. 20G to LAC, Labs sent, will continue to monitor.

## 2021-08-23 NOTE — ED ADULT TRIAGE NOTE - CHIEF COMPLAINT QUOTE
pt c/o abdominal pain, diarrhea s/p prostatectomy approx 2 weeks ago. Diagnosed prostate ca in Nov 2020. Not on chemo at this time. Pt sent in by MD Scott.

## 2021-08-23 NOTE — ED PROVIDER NOTE - PHYSICAL EXAMINATION
General appearance: NAD, conversant, afebrile    Neck: Trachea midline; Full range of motion, supple   Pulm: CTA bl, normal respiratory effort and no intercostal retractions, normal work of breathing   CV: RRR, No murmurs, rubs, or gallops   Abdomen: Soft, mild lower abdominal tenderness, non-distended; no guarding or rebound   Extremities: No peripheral edema    Skin: Dry, normal temperature, turgor and texture; no rash   Psych: Appropriate affect, cooperative; alert and oriented to person, place and time

## 2021-08-23 NOTE — H&P ADULT - NSHPLABSRESULTS_GEN_ALL_CORE
66yom with hx of prostate cancer s/p SP RALP now presents with fever.    - Urine and blood cx sent  - f/u CT  - Labs reviewed  - 66yom with hx of prostate cancer s/p SP RALP now presents with fever.    - Urine and blood cx sent  - CT reviewed  - Labs reviewed  - CTX  - admit to Dr. Jorge Luis Scott

## 2021-08-23 NOTE — H&P ADULT - HISTORY OF PRESENT ILLNESS
HPI:    65 y/o male with h/o HLD, laryngeal CA in 2018 s/p chemo and radiation therapy, recently diagnosed prostate CA and s/p robotic assisted radical prostatectomy w/ pelvic lymph node dissection on 8/10. Surgery was complicated by small bowel injury s/p repair. His post operative course was not complicated, and he was discharged on POD 3.  Lorenz was removed on the .    He reports that over the weekend, he experienced fever as high as 101F associated with diarrhea and anorexia.  Denies urinary symptoms including, frequency, urgency, dysuria, hematuria, incomplete emptying and fever, nausea, vomiting, chest pain, or shortness of breath.  In ED, afebrile but tachycardic to 105.   + leukocytosis to 15.67, CT 25.9 down from discharge of 29. UA with pyuria and many bacteria.        O: Vital Signs Last 24 Hrs  T(C): 37 (23 Aug 2021 10:22), Max: 37 (23 Aug 2021 10:22)  T(F): 98.6 (23 Aug 2021 10:22), Max: 98.6 (23 Aug 2021 10:22)  HR: 105 (23 Aug 2021 10:22) (105 - 105)  BP: 112/65 (23 Aug 2021 10:22) (112/65 - 112/65)  BP(mean): --  RR: 16 (23 Aug 2021 10:22) (16 - 16)  SpO2: 100% (23 Aug 2021 10:22) (100% - 100%)          Labs:                        8.4    15.67 )-----------( 536      ( 23 Aug 2021 11:42 )             25.9     23 Aug 2021 11:42    126    |  91     |  18     ----------------------------<  110    3.9     |  21     |  0.95     Ca    8.7        23 Aug 2021 11:42    TPro  6.5    /  Alb  3.5    /  TBili  1.3    /  DBili  x      /  AST  49     /  ALT  49     /  AlkPhos  96     23 Aug 2021 11:42      CAPILLARY BLOOD GLUCOSE            LIVER FUNCTIONS - ( 23 Aug 2021 11:42 )  Alb: 3.5 g/dL / Pro: 6.5 g/dL / ALK PHOS: 96 U/L / ALT: 49 U/L / AST: 49 U/L / GGT: x             Urinalysis Basic - ( 23 Aug 2021 11:42 )    Color: Tamara / Appearance: Turbid / S.024 / pH: x  Gluc: x / Ketone: Negative  / Bili: Small / Urobili: 6 mg/dL   Blood: x / Protein: 100 mg/dL / Nitrite: Negative   Leuk Esterase: Large / RBC: 10 /HPF / WBC >50 /HPF   Sq Epi: x / Non Sq Epi: 1 /HPF / Bacteria: Many        MEDICATIONS  (STANDING):    MEDICATIONS  (PRN):

## 2021-08-23 NOTE — ED PROVIDER NOTE - CLINICAL SUMMARY MEDICAL DECISION MAKING FREE TEXT BOX
65yo male with pmh hld, prostate ca s/p prostatectomy 8/10 presenting with fever, lower abdominal pain.  Concern for post op complication/ infection vs colitis (low likelihood c diff).  Well appearing here with stable vitals although has been taking antipyretics.  Will discuss with urology and plan for cultures, labs, ctap.  Reassess after results.

## 2021-08-23 NOTE — ED PROVIDER NOTE - OBJECTIVE STATEMENT
65yo male with pmh hld, prostate ca s/p prostatectomy 8/10 presenting with fever, lower abdominal pain.  States days after procedure he was feeling well but over past few days, began developing gassy lower abdominal pain and loose, nonbloody stool.  Temp at home yesterday 101.  Has been taking tylenol for fever.  No urinary symptoms, nausea, vomiting.

## 2021-08-24 DIAGNOSIS — Z85.46 PERSONAL HISTORY OF MALIGNANT NEOPLASM OF PROSTATE: ICD-10-CM

## 2021-08-24 DIAGNOSIS — E78.5 HYPERLIPIDEMIA, UNSPECIFIED: ICD-10-CM

## 2021-08-24 DIAGNOSIS — J44.9 CHRONIC OBSTRUCTIVE PULMONARY DISEASE, UNSPECIFIED: ICD-10-CM

## 2021-08-24 DIAGNOSIS — N39.0 URINARY TRACT INFECTION, SITE NOT SPECIFIED: ICD-10-CM

## 2021-08-24 DIAGNOSIS — D64.9 ANEMIA, UNSPECIFIED: ICD-10-CM

## 2021-08-24 DIAGNOSIS — R19.7 DIARRHEA, UNSPECIFIED: ICD-10-CM

## 2021-08-24 LAB
ANION GAP SERPL CALC-SCNC: 8 MMOL/L — SIGNIFICANT CHANGE UP (ref 7–14)
BUN SERPL-MCNC: 18 MG/DL — SIGNIFICANT CHANGE UP (ref 7–23)
CALCIUM SERPL-MCNC: 8.3 MG/DL — LOW (ref 8.4–10.5)
CHLORIDE SERPL-SCNC: 98 MMOL/L — SIGNIFICANT CHANGE UP (ref 98–107)
CO2 SERPL-SCNC: 22 MMOL/L — SIGNIFICANT CHANGE UP (ref 22–31)
COVID-19 SPIKE DOMAIN AB INTERP: POSITIVE
COVID-19 SPIKE DOMAIN ANTIBODY RESULT: >250 U/ML — HIGH
CREAT SERPL-MCNC: 0.85 MG/DL — SIGNIFICANT CHANGE UP (ref 0.5–1.3)
GLUCOSE SERPL-MCNC: 132 MG/DL — HIGH (ref 70–99)
HCT VFR BLD CALC: 25.1 % — LOW (ref 39–50)
HGB BLD-MCNC: 8.2 G/DL — LOW (ref 13–17)
MAGNESIUM SERPL-MCNC: 2 MG/DL — SIGNIFICANT CHANGE UP (ref 1.6–2.6)
MCHC RBC-ENTMCNC: 32.5 PG — SIGNIFICANT CHANGE UP (ref 27–34)
MCHC RBC-ENTMCNC: 32.7 GM/DL — SIGNIFICANT CHANGE UP (ref 32–36)
MCV RBC AUTO: 99.6 FL — SIGNIFICANT CHANGE UP (ref 80–100)
NRBC # BLD: 0 /100 WBCS — SIGNIFICANT CHANGE UP
NRBC # FLD: 0 K/UL — SIGNIFICANT CHANGE UP
PHOSPHATE SERPL-MCNC: 2.7 MG/DL — SIGNIFICANT CHANGE UP (ref 2.5–4.5)
PLATELET # BLD AUTO: 505 K/UL — HIGH (ref 150–400)
POTASSIUM SERPL-MCNC: 3.9 MMOL/L — SIGNIFICANT CHANGE UP (ref 3.5–5.3)
POTASSIUM SERPL-SCNC: 3.9 MMOL/L — SIGNIFICANT CHANGE UP (ref 3.5–5.3)
RBC # BLD: 2.52 M/UL — LOW (ref 4.2–5.8)
RBC # FLD: 15.7 % — HIGH (ref 10.3–14.5)
SARS-COV-2 IGG+IGM SERPL QL IA: >250 U/ML — HIGH
SARS-COV-2 IGG+IGM SERPL QL IA: POSITIVE
SODIUM SERPL-SCNC: 128 MMOL/L — LOW (ref 135–145)
WBC # BLD: 11.03 K/UL — HIGH (ref 3.8–10.5)
WBC # FLD AUTO: 11.03 K/UL — HIGH (ref 3.8–10.5)

## 2021-08-24 PROCEDURE — 99223 1ST HOSP IP/OBS HIGH 75: CPT

## 2021-08-24 RX ORDER — LACTOBACILLUS ACIDOPHILUS 100MM CELL
1 CAPSULE ORAL
Refills: 0 | Status: DISCONTINUED | OUTPATIENT
Start: 2021-08-24 | End: 2021-08-26

## 2021-08-24 RX ADMIN — HEPARIN SODIUM 5000 UNIT(S): 5000 INJECTION INTRAVENOUS; SUBCUTANEOUS at 05:57

## 2021-08-24 RX ADMIN — Medication 81 MILLIGRAM(S): at 14:28

## 2021-08-24 RX ADMIN — HEPARIN SODIUM 5000 UNIT(S): 5000 INJECTION INTRAVENOUS; SUBCUTANEOUS at 14:28

## 2021-08-24 RX ADMIN — CEFTRIAXONE 100 MILLIGRAM(S): 500 INJECTION, POWDER, FOR SOLUTION INTRAMUSCULAR; INTRAVENOUS at 14:28

## 2021-08-24 RX ADMIN — Medication 1 TABLET(S): at 14:28

## 2021-08-24 RX ADMIN — HEPARIN SODIUM 5000 UNIT(S): 5000 INJECTION INTRAVENOUS; SUBCUTANEOUS at 23:24

## 2021-08-24 RX ADMIN — SIMVASTATIN 20 MILLIGRAM(S): 20 TABLET, FILM COATED ORAL at 23:24

## 2021-08-24 NOTE — CONSULT NOTE ADULT - ASSESSMENT
66M with h/o hld, laryngeal CA in 2018 s/p chemo and radiation, COPD (not on inhaler), mild carotid artery disease, recently diagnosed prostate CA and s/p robotic assisted radical prostatectomy w/ pelvic lymph node dissection on 8/10/21, c/b small bowel injury s/p repair, discharged home on POD3, brice removed 8/18, now p/w fever, diarrhea, found to have UTI

## 2021-08-24 NOTE — CONSULT NOTE ADULT - SUBJECTIVE AND OBJECTIVE BOX
Alvina Norris MD  Pager [phone]    HPI:  HPI:    67 y/o male with h/o HLD, laryngeal CA in 2018 s/p chemo and radiation therapy, COPD (not on inhaler), mild carotid artery disease, recently diagnosed prostate CA and s/p robotic assisted radical prostatectomy w/ pelvic lymph node dissection on 8/10/21, c/b small bowel injury s/p repair. His post operative course was not complicated, and he was discharged on POD 3.  Brice was removed on the . Patient reports he developed fever 101F after brice removal, a/w suprapubic pain, anorexia, denies urinary symptoms including frequency, urgency, dysuria, hematuria, incomplete emptying, nausea, vomiting, chest pain, or shortness of breath.  In ED, afebrile but tachycardic to 105.   + leukocytosis to 15.67, Hct 25.9 down from discharge of 29. UA with pyuria and many bacteria.  CT abd/pelvis 1. Peripherally enhancing fluid and mottled gas collections in the prostate bed, which may represent small abscesses.  2. Concentric laryngeal thickening with severe airway narrowing.  3. Small bowel ileus. Patient started on Ceftriaxone for UTI. He reports no diarrhea x24h now. Suprapubic pain improved after passing flatus.       O: Vital Signs Last 24 Hrs  T(C): 37 (23 Aug 2021 10:22), Max: 37 (23 Aug 2021 10:22)  T(F): 98.6 (23 Aug 2021 10:22), Max: 98.6 (23 Aug 2021 10:22)  HR: 105 (23 Aug 2021 10:22) (105 - 105)  BP: 112/65 (23 Aug 2021 10:22) (112/65 - 112/65)  BP(mean): --  RR: 16 (23 Aug 2021 10:22) (16 - 16)  SpO2: 100% (23 Aug 2021 10:22) (100% - 100%)          Labs:                        8.4    15.67 )-----------( 536      ( 23 Aug 2021 11:42 )             25.9     23 Aug 2021 11:42    126    |  91     |  18     ----------------------------<  110    3.9     |  21     |  0.95     Ca    8.7        23 Aug 2021 11:42    TPro  6.5    /  Alb  3.5    /  TBili  1.3    /  DBili  x      /  AST  49     /  ALT  49     /  AlkPhos  96     23 Aug 2021 11:42      CAPILLARY BLOOD GLUCOSE            LIVER FUNCTIONS - ( 23 Aug 2021 11:42 )  Alb: 3.5 g/dL / Pro: 6.5 g/dL / ALK PHOS: 96 U/L / ALT: 49 U/L / AST: 49 U/L / GGT: x             Urinalysis Basic - ( 23 Aug 2021 11:42 )    Color: Tamara / Appearance: Turbid / S.024 / pH: x  Gluc: x / Ketone: Negative  / Bili: Small / Urobili: 6 mg/dL   Blood: x / Protein: 100 mg/dL / Nitrite: Negative   Leuk Esterase: Large / RBC: 10 /HPF / WBC >50 /HPF   Sq Epi: x / Non Sq Epi: 1 /HPF / Bacteria: Many        MEDICATIONS  (STANDING):    MEDICATIONS  (PRN):   (23 Aug 2021 12:32)      PAST MEDICAL & SURGICAL HISTORY:  Laryngeal polyp    Laryngeal cancer    Colonic polyp  malignant    Tubular adenoma of colon    Elevated blood pressure reading  on occasion    HLD (hyperlipidemia)    Prostate cancer    Pulmonary nodules  pt states nodules being monitored by pulmonary    Aneurysm of aortic root    History of COPD  no use of inhalers    Mild carotid artery disease    Stricture of artery    Superior mesenteric artery stenosis  followed by V[facility]. last visit 2021.    H/O colonoscopy  2017    History of chemotherapy    S/P radiation therapy    H/O prostate biopsy        Review of Systems:   CONSTITUTIONAL: +fever, weigh loss, fatigue, anorexia  EYES: No eye pain, visual disturbances, or discharge  ENMT:  No difficulty hearing, tinnitus, vertigo; No sinus or throat pain  NECK: No pain or stiffness  BREASTS: No pain, masses, or nipple discharge  RESPIRATORY: No cough, wheezing, chills or hemoptysis; No shortness of breath  CARDIOVASCULAR: No chest pain, palpitations, dizziness, or leg swelling  GASTROINTESTINAL: lower abdominal pain,  No nausea, vomiting, or hematemesis; No diarrhea or constipation. No melena or hematochezia.  GENITOURINARY: No dysuria, frequency, hematuria, or incontinence  NEUROLOGICAL: No headaches, memory loss, loss of strength, numbness, or tremors  SKIN: No itching, burning, rashes, or lesions   LYMPH NODES: No enlarged glands  ENDOCRINE: No heat or cold intolerance; No hair loss  MUSCULOSKELETAL: No joint pain or swelling; No muscle, back, or extremity pain  PSYCHIATRIC: No depression, anxiety, mood swings, or difficulty sleeping  HEME/LYMPH: No easy bruising, or bleeding gums  ALLERY AND IMMUNOLOGIC: No hives or eczema    Allergies    No Known Allergies    Intolerances      Social History:   Former smoker, smoked 1PPD x 35 years , quit 2018  Drinks 1-2 glasses of wine 2-3 times a week     FAMILY HISTORY:  Brother with prostate CA    Home Medications:  simvastatin 20 mg oral tablet: 1 tab(s) orally once a day (at bedtime) (23 Aug 2021 16:54)      MEDICATIONS  (STANDING):  aspirin  chewable 81 milliGRAM(s) Oral daily  cefTRIAXone   IVPB 1000 milliGRAM(s) IV Intermittent every 24 hours  heparin   Injectable 5000 Unit(s) SubCutaneous every 8 hours  lactated ringers. 1000 milliLiter(s) (75 mL/Hr) IV Continuous <Continuous>  simvastatin 20 milliGRAM(s) Oral at bedtime    MEDICATIONS  (PRN):  acetaminophen   Tablet .. 975 milliGRAM(s) Oral every 6 hours PRN Mild Pain (1 - 3)      Vital Signs Last 24 Hrs  T(C): 36.9 (24 Aug 2021 10:00), Max: 37.4 (23 Aug 2021 13:52)  T(F): 98.4 (24 Aug 2021 10:00), Max: 99.3 (23 Aug 2021 13:52)  HR: 92 (24 Aug 2021 10:00) (91 - 102)  BP: 100/58 (24 Aug 2021 10:00) (93/69 - 140/69)  BP(mean): --  RR: 17 (24 Aug 2021 10:00) (17 - 18)  SpO2: 97% (24 Aug 2021 10:00) (95% - 100%)  CAPILLARY BLOOD GLUCOSE        I&O's Summary    23 Aug 2021 07:01  -  24 Aug 2021 07:00  --------------------------------------------------------  IN: 240 mL / OUT: 850 mL / NET: -610 mL        PHYSICAL EXAM:  GENERAL: NAD, well-developed  HEAD:  Atraumatic, Normocephalic  EYES: EOMI, conjunctiva and sclera clear  NECK: Supple, No JVD  CHEST/LUNG: Clear to auscultation bilaterally; No wheeze  HEART: Regular rate and rhythm; No murmurs, rubs, or gallops  ABDOMEN: Soft, minimal suprapubic ttp,  Nondistended; Bowel sounds present  EXTREMITIES:  2+ Peripheral Pulses, No clubbing, cyanosis, or edema  PSYCH: AAOx3  NEUROLOGY: non-focal  SKIN: ecchymosis on thighs and lower abdomen, penis/scrotum    LABS:                        8.2    11.03 )-----------( 505      ( 24 Aug 2021 09:51 )             25.1     -    128<L>  |  98  |  18  ----------------------------<  132<H>  3.9   |  22  |  0.85    Ca    8.3<L>      24 Aug 2021 09:51  Phos  2.7       Mg     2.00         TPro  6.5  /  Alb  3.5  /  TBili  1.3<H>  /  DBili  x   /  AST  49<H>  /  ALT  49<H>  /  AlkPhos  96      Urinalysis Basic - ( 23 Aug 2021 11:42 )    Color: Tamara / Appearance: Turbid / S.024 / pH: x  Gluc: x / Ketone: Negative  / Bili: Small / Urobili: 6 mg/dL   Blood: x / Protein: 100 mg/dL / Nitrite: Negative   Leuk Esterase: Large / RBC: 10 /HPF / WBC >50 /HPF   Sq Epi: x / Non Sq Epi: 1 /HPF / Bacteria: Many      Microbiology Culture Results:   >100,000 CFU/ml Gram Negative Rods (21 @ 14:56)      RADIOLOGY & ADDITIONAL TESTS:    Imaging Personally Reviewed:  < from: Xray Chest 1 View- PORTABLE-Urgent (21 @ 12:58) >  IMPRESSION:  Severe laryngeal narrowing better visualized on CT of the same day.  Clear lungs.    < end of copied text >  < from: Xray Chest 1 View- PORTABLE-Urgent (21 @ 12:58) >  IMPRESSION:  Severe laryngeal narrowing better visualized on CT of the same day.  Clear lungs.      < from: CT Abdomen and Pelvis w/ Oral Cont and w/ IV Cont (21 @ 12:50) >  MEDIASTINUM AND AKILA: Stable shotty mediastinal and bilateral hilar lymph nodes. No axillary lymphadenopathy.  CHEST WALL AND LOWER NECK: There is moderate concentric wall thickening of the larynx with severe luminal narrowing, best visualized on image 7.    ABDOMEN AND PELVIS:  LIVER: Within normal limits.  BILE DUCTS: Normal caliber.  GALLBLADDER: Within normal limits.  SPLEEN: Within normal limits.  PANCREAS: Within normal limits.  ADRENALS: Within normal limits.  KIDNEYS/URETERS: Small cyst in the anterior lower pole of theleft kidney. Otherwise, within normal limits.    BLADDER: Moderate concentric wall thickening. Trace perivesicular stranding.  REPRODUCTIVE ORGANS: The patient is status post robotic prostatectomy. There is a 3.0 x 3.1 cm peripherally enhancing fluidand mottled gas collection arising from the left seminal vesicle and extending cephalad. There is a similar 3.5 x 2.8 cm collection extending inferiorly in the left lateral prostate bed. There are seromas along the bilateral pelvic sidewalls on images 149 and 152. There is mild subcutaneous gas tracking along the abdominal and pelvic wall and visualized proximal thighs.    BOWEL: There is moderate concentric wall thickening of the gastric antrum and pylorus with question of mucosal ulceration on image 92. There is moderate concentric wall thickening of the rectum, likely reactive. Small bowel loops are diffusely patulous, without transition, compatible with ileus. Otherwise, small and large bowel loops are unremarkable with no evidence of obstruction. The appendix is unremarkable.  PERITONEUM: No ascites.  VESSELS: Within normal limits.  RETROPERITONEUM/LYMPH NODES: No lymphadenopathy.  ABDOMINAL WALL: Within normal limits.  BONES: Within normal limits.    IMPRESSION:  1. Peripherally enhancing fluid and mottled gas collections in the prostate bed, which may represent small abscesses.  2. Concentric laryngeal thickening with severe airway narrowing.  3. Small bowel ileus.    Consultant(s) Notes Reviewed:      Care Discussed with Consultants/Other Providers: urology resident and ALFREDO Arambula, treating for UTI, doubt abscess

## 2021-08-24 NOTE — CONSULT NOTE ADULT - PROBLEM SELECTOR RECOMMENDATION 2
-CT c/f small bowel ileus, pt reports no diarrhea x24h  -if diarrhea recurs check stool for C. diff and GI PCR  -add probiotic lactobacillus

## 2021-08-24 NOTE — CONSULT NOTE ADULT - PROBLEM SELECTOR RECOMMENDATION 9
-prostate CA, s/p robotic assisted radical prostatectomy w/ pelvic lymph node dissection on 8/10/21, c/b small bowel injury s/p repair, discharged home on POD3, brice removed 8/18  -developed fever, suprapubic pain, diarrhea at home  -UA pyuria, f/u cultures, c/w Ceftriaxone  -WBC downtrending from 15 to 11  -CT abd/pelvis c/f Peripherally enhancing fluid and mottled gas collections in the prostate bed, which may represent small abscesses.   -d/w urology team, low suspicion for abscess at prostatic bed, c/w UTI treatment as above -prostate CA, s/p robotic assisted radical prostatectomy w/ pelvic lymph node dissection on 8/10/21, c/b small bowel injury s/p repair, discharged home on POD3, brice removed 8/18  -developed fever, suprapubic pain, diarrhea at home  -UA pyuria, f/u cultures (Ucx 8/23 GNR), c/w Ceftriaxone  -WBC downtrending from 15 to 11  -CT abd/pelvis c/f Peripherally enhancing fluid and mottled gas collections in the prostate bed, which may represent small abscesses.   -d/w urology team, low suspicion for abscess at prostatic bed, c/w UTI treatment as above

## 2021-08-24 NOTE — CONSULT NOTE ADULT - PROBLEM SELECTOR RECOMMENDATION 3
monitor CBC, H/H down from 10.1/29 on 8/13 to 8.4/25.9 on 8/23, transfuse prn, pt has ecchymosis in thighs/abdomen/scrotum, no RP bleed on CT

## 2021-08-24 NOTE — PROGRESS NOTE ADULT - ATTENDING COMMENTS
Tolerating diet.  +Flatus, diarrhea mostly resolved.  Await culture results. Continue IV abx.  Ambulating well.  Voiding w/o difficulty.  No stress incontinence.

## 2021-08-25 DIAGNOSIS — E87.1 HYPO-OSMOLALITY AND HYPONATREMIA: ICD-10-CM

## 2021-08-25 PROCEDURE — 99232 SBSQ HOSP IP/OBS MODERATE 35: CPT

## 2021-08-25 RX ORDER — SODIUM CHLORIDE 9 MG/ML
1000 INJECTION INTRAMUSCULAR; INTRAVENOUS; SUBCUTANEOUS
Refills: 0 | Status: DISCONTINUED | OUTPATIENT
Start: 2021-08-25 | End: 2021-08-26

## 2021-08-25 RX ADMIN — Medication 1 TABLET(S): at 05:09

## 2021-08-25 RX ADMIN — Medication 81 MILLIGRAM(S): at 11:26

## 2021-08-25 RX ADMIN — SIMVASTATIN 20 MILLIGRAM(S): 20 TABLET, FILM COATED ORAL at 21:48

## 2021-08-25 RX ADMIN — SODIUM CHLORIDE 100 MILLILITER(S): 9 INJECTION INTRAMUSCULAR; INTRAVENOUS; SUBCUTANEOUS at 11:26

## 2021-08-25 RX ADMIN — HEPARIN SODIUM 5000 UNIT(S): 5000 INJECTION INTRAVENOUS; SUBCUTANEOUS at 21:48

## 2021-08-25 RX ADMIN — Medication 1 TABLET(S): at 18:56

## 2021-08-25 RX ADMIN — HEPARIN SODIUM 5000 UNIT(S): 5000 INJECTION INTRAVENOUS; SUBCUTANEOUS at 05:09

## 2021-08-25 RX ADMIN — CEFTRIAXONE 100 MILLIGRAM(S): 500 INJECTION, POWDER, FOR SOLUTION INTRAMUSCULAR; INTRAVENOUS at 14:21

## 2021-08-25 RX ADMIN — HEPARIN SODIUM 5000 UNIT(S): 5000 INJECTION INTRAVENOUS; SUBCUTANEOUS at 14:21

## 2021-08-26 ENCOUNTER — TRANSCRIPTION ENCOUNTER (OUTPATIENT)
Age: 66
End: 2021-08-26

## 2021-08-26 VITALS
RESPIRATION RATE: 17 BRPM | SYSTOLIC BLOOD PRESSURE: 112 MMHG | HEART RATE: 83 BPM | DIASTOLIC BLOOD PRESSURE: 64 MMHG | TEMPERATURE: 98 F | OXYGEN SATURATION: 100 %

## 2021-08-26 PROCEDURE — 99232 SBSQ HOSP IP/OBS MODERATE 35: CPT

## 2021-08-26 RX ORDER — ACETAMINOPHEN 500 MG
3 TABLET ORAL
Qty: 0 | Refills: 0 | DISCHARGE
Start: 2021-08-26

## 2021-08-26 RX ORDER — CEFPODOXIME PROXETIL 100 MG
1 TABLET ORAL
Qty: 12 | Refills: 0
Start: 2021-08-26 | End: 2021-08-31

## 2021-08-26 RX ORDER — ASPIRIN/CALCIUM CARB/MAGNESIUM 324 MG
1 TABLET ORAL
Qty: 0 | Refills: 0 | DISCHARGE
Start: 2021-08-26

## 2021-08-26 RX ORDER — LACTOBACILLUS ACIDOPHILUS 100MM CELL
1 CAPSULE ORAL
Qty: 12 | Refills: 0
Start: 2021-08-26 | End: 2021-08-31

## 2021-08-26 RX ADMIN — HEPARIN SODIUM 5000 UNIT(S): 5000 INJECTION INTRAVENOUS; SUBCUTANEOUS at 06:32

## 2021-08-26 RX ADMIN — Medication 1 TABLET(S): at 06:31

## 2021-08-26 NOTE — DISCHARGE NOTE NURSING/CASE MANAGEMENT/SOCIAL WORK - NSDCPEFALRISK_GEN_ALL_CORE
For information on Fall & injury Prevention, visit [email or web address]

## 2021-08-26 NOTE — PROGRESS NOTE ADULT - PROBLEM SELECTOR PLAN 6
c/w simvastatin  h/o mild carotid artery disease, c/w asa and statin.
c/w simvastatin  h/o mild carotid artery disease, c/w asa and statin.

## 2021-08-26 NOTE — DISCHARGE NOTE PROVIDER - CARE PROVIDER_API CALL
Jorge Luis Scott)  Urology  [address] [place] [address], [place] [address]  Phone: [phone]  Fax: [phone]  Follow Up Time:

## 2021-08-26 NOTE — PROGRESS NOTE ADULT - PROBLEM SELECTOR PLAN 3
Na 128, likely hypovolemic hyponatremia i/s/o dehydration/poor intake  s/p IVF NS  trend Na
Na 128, likely hypovolemic hyponatremia i/s/o dehydration/poor intake  IVF NS 100ml/h x10h  trend Na

## 2021-08-26 NOTE — DISCHARGE NOTE PROVIDER - HOSPITAL COURSE
67 y/o M h/o HLD, laryngeal CA in 2018 s/p chemo and radiation therapy, recently diagnosed prostate CA and s/p robotic assisted radical prostatectomy w/ pelvic lymph node dissection on 8/10. Surgery was complicated by small bowel injury s/p repair. His post operative course was not complicated, and he was discharged on POD 3.  Lorenz was removed on 8/18    He reports that over the weekend, he experienced fever as high as 101F associated with diarrhea and anorexia.  Denies urinary symptoms including, frequency, urgency, dysuria, hematuria, incomplete emptying and fever, nausea, vomiting, chest pain, or shortness of breath.  In ED, afebrile but tachycardic to 105.   + leukocytosis to 15.67, CT 25.9 down from discharge of 29. UA with pyuria and many bacteria.      8/24 - feels better this AM; no loose stools X 24hrs; abd and scrotal ecchymosis noted, resolving  8/25 - feels better, no further loose stools, tolerating diet, Ucx GNR, Bcx NGTD  8/26 - feels good, no further diarrhea, ambulating, voiding well, tolerating diet, remains afebrile, Ucx klebsiella, Bcx NGTD, pt d/c on Vantin x 6 more days to f/u with Dr. Scott

## 2021-08-26 NOTE — PROGRESS NOTE ADULT - PROBLEM SELECTOR PLAN 1
-prostate CA, s/p robotic assisted radical prostatectomy w/ pelvic lymph node dissection on 8/10/21, c/b small bowel injury s/p repair, discharged home on POD3, brice removed 8/18  -developed fever, suprapubic pain, diarrhea at home  -UA pyuria, f/u cultures (Ucx 8/23 Klebsiella, Bcx NGTD), c/w Ceftriaxone  -WBC downtrending from 15 to 11  -CT abd/pelvis c/f Peripherally enhancing fluid and mottled gas collections in the prostate bed, which may represent small abscesses.   -d/w urology team, low suspicion for abscess at prostatic bed, c/w UTI treatment as above.  -dc home today on Vantin x6 more days per urology
-prostate CA, s/p robotic assisted radical prostatectomy w/ pelvic lymph node dissection on 8/10/21, c/b small bowel injury s/p repair, discharged home on POD3, brice removed 8/18  -developed fever, suprapubic pain, diarrhea at home  -UA pyuria, f/u cultures (Ucx 8/23 GNR, Bcx NGTD), c/w Ceftriaxone  -WBC downtrending from 15 to 11  -CT abd/pelvis c/f Peripherally enhancing fluid and mottled gas collections in the prostate bed, which may represent small abscesses.   -d/w urology team, low suspicion for abscess at prostatic bed, c/w UTI treatment as above.

## 2021-08-26 NOTE — PROGRESS NOTE ADULT - SUBJECTIVE AND OBJECTIVE BOX
Overnight events:  None    Subjective:  Pt feels better, decreased appetite but tolerating diet, no N/V, + flatus    Objective:    Vital signs  T(C): , Max: 37.6 (08-24-21 @ 14:00)  HR: 84 (08-25-21 @ 05:08)  BP: 120/64 (08-25-21 @ 05:08)  SpO2: 96% (08-25-21 @ 05:08)  Wt(kg): --    Output   Void: 650  08-24 @ 07:01  -  08-25 @ 07:00  --------------------------------------------------------  IN: 0 mL / OUT: 1400 mL / NET: -1400 mL        Gen: NAD  Abd: steris c/d/i, resolving left abd/flank/scrotal ecchymoses, soft, nontender, nondistended      Labs: none today                        8.2    11.03 )-----------( 505      ( 24 Aug 2021 09:51 )             25.1     24 Aug 2021 09:51    128    |  98     |  18     ----------------------------<  132    3.9     |  22     |  0.85     Ca    8.3        24 Aug 2021 09:51  Phos  2.7       24 Aug 2021 09:51  Mg     2.00      24 Aug 2021 09:51        Urine Cx:   Culture - Urine (08.23.21 @ 14:56)    Specimen Source: Clean Catch Clean Catch (Midstream)    Culture Results:   >100,000 CFU/ml Gram Negative Rods    Blood Cx:   Culture - Blood (08.23.21 @ 14:37)    Specimen Source: .Blood Blood-Peripheral    Culture Results:   No growth to date.    Imaging:  < from: CT Abdomen and Pelvis w/ Oral Cont and w/ IV Cont (08.23.21 @ 12:50) >  FINDINGS:  CHEST:  LUNGS AND LARGE AIRWAYS: Patent central airways. No pulmonary nodules. Mild smooth thickening of the interlobular septa at the apices.  PLEURA: No pleural effusion.  VESSELS: Within normal limits.  HEART: Heart size is normal. No pericardial effusion.  MEDIASTINUM AND AKILA: Stable shotty mediastinal and bilateral hilar lymph nodes. No axillary lymphadenopathy.  CHEST WALL AND LOWER NECK: There is moderate concentric wall thickening of the larynx with severe luminal narrowing, best visualized on image 7.    ABDOMEN AND PELVIS:  LIVER: Within normal limits.  BILE DUCTS: Normal caliber.  GALLBLADDER: Within normal limits.  SPLEEN: Within normal limits.  PANCREAS: Within normal limits.  ADRENALS: Within normal limits.  KIDNEYS/URETERS: Small cyst in the anterior lower pole of theleft kidney. Otherwise, within normal limits.    BLADDER: Moderate concentric wall thickening. Trace perivesicular stranding.  REPRODUCTIVE ORGANS: The patient is status post robotic prostatectomy. There is a 3.0 x 3.1 cm peripherally enhancing fluidand mottled gas collection arising from the left seminal vesicle and extending cephalad. There is a similar 3.5 x 2.8 cm collection extending inferiorly in the left lateral prostate bed. There are seromas along the bilateral pelvic sidewalls on images 149 and 152. There is mild subcutaneous gas tracking along the abdominal and pelvic wall and visualized proximal thighs.    BOWEL: There is moderate concentric wall thickening of the gastric antrum and pylorus with question of mucosal ulceration on image 92. There is moderate concentric wall thickening of the rectum, likely reactive. Small bowel loops are diffusely patulous, without transition, compatible with ileus. Otherwise, small and large bowel loops are unremarkable with no evidence of obstruction. The appendix is unremarkable.  PERITONEUM: No ascites.  VESSELS: Within normal limits.  RETROPERITONEUM/LYMPH NODES: No lymphadenopathy.  ABDOMINAL WALL: Within normal limits.  BONES: Within normal limits.    IMPRESSION:  1. Peripherally enhancing fluid and mottled gas collections in the prostate bed, which may represent small abscesses.  2. Concentric laryngeal thickening with severe airway narrowing.  3. Small bowel ileus.      
Overnight events:  None, remained afebrile    Subjective:  Pt offers no complaints, feels well, no more diarrhea, 2 small formed stools last night    Objective:    Vital signs  T(C): , Max: 36.8 (08-25-21 @ 10:20)  HR: 87 (08-26-21 @ 06:37)  BP: 122/65 (08-26-21 @ 06:37)  SpO2: 95% (08-26-21 @ 06:37)  Wt(kg): --    Output   Void: 850  08-25 @ 07:01  -  08-26 @ 07:00  --------------------------------------------------------  IN: 0 mL / OUT: 2000 mL / NET: -2000 mL        Gen: NAD  Abd: soft, nontender, resolving left flank/abdominal/scrotal and right thigh ecchymoses      Labs: none today                        8.2    11.03 )-----------( 505      ( 24 Aug 2021 09:51 )             25.1     24 Aug 2021 09:51    128    |  98     |  18     ----------------------------<  132    3.9     |  22     |  0.85     Ca    8.3        24 Aug 2021 09:51  Phos  2.7       24 Aug 2021 09:51  Mg     2.00      24 Aug 2021 09:51        Urine Cx:   Culture - Urine (08.23.21 @ 11:30)    -  Amikacin: S <=16    -  Amoxicillin/Clavulanic Acid: S <=8/4    -  Ampicillin: R >16 These ampicillin results predict results for amoxicillin    -  Ampicillin/Sulbactam: S <=4/2 Enterobacter, Citrobacter, and Serratia may develop resistance during prolonged therapy (3-4 days)    -  Aztreonam: S <=4    -  Cefazolin: S <=2 (MIC_CL_COM_ENTERIC_CEFAZU) For uncomplicated UTI with K. pneumoniae, E. coli, or P. mirablis: GINGER <=16 is sensitive and GINGER >=32 is resistant. This also predicts results for oral agents cefaclor, cefdinir, cefpodoxime, cefprozil, cefuroxime axetil, cephalexin and locarbef for uncomplicated UTI. Note that some isolates may be susceptible to these agents while testing resistant to cefazolin.    -  Cefepime: S <=2    -  Cefoxitin: S <=8    -  Ceftriaxone: S <=1 Enterobacter, Citrobacter, and Serratia may develop resistance during prolonged therapy    -  Ciprofloxacin: S <=0.25    -  Ertapenem: S <=0.5    -  Gentamicin: S <=2    -  Imipenem: S <=1    -  Levofloxacin: S <=0.5    -  Meropenem: S <=1    -  Nitrofurantoin: I 64 Should not be used to treat pyelonephritis    -  Piperacillin/Tazobactam: S <=8    -  Tigecycline: S <=2    -  Tobramycin: S <=2    -  Trimethoprim/Sulfamethoxazole: S 1/19    Specimen Source: Clean Catch Clean Catch (Midstream)    Culture Results:   >100,000 CFU/ml Klebsiella pneumoniae    Organism Identification: Klebsiella pneumoniae    Organism: Klebsiella pneumoniae    Method Type: GINGER    Blood Cx:   Culture - Blood (08.23.21 @ 14:37)    Specimen Source: .Blood Blood-Peripheral    Culture Results:   No growth to date.        
No events overnite  No loose BM's for 24 hrs  Afeb 128/67 98 95%RA    Pt has no c/o; feels a little better  Abd- soft NT ND; + flatus  Ecchymosis noted L. side of abd and penis/scrotum  Void 450  
Dr. Alvina Norris  Pager [phone]    PROGRESS NOTE:     Patient is a 66y old  Male who presents with a chief complaint of     SUBJECTIVE / OVERNIGHT EVENTS: Pt doing better, no abd pain  ADDITIONAL REVIEW OF SYSTEMS: afebrile, denies chest pain /sob    MEDICATIONS  (STANDING):  aspirin  chewable 81 milliGRAM(s) Oral daily  cefTRIAXone   IVPB 1000 milliGRAM(s) IV Intermittent every 24 hours  heparin   Injectable 5000 Unit(s) SubCutaneous every 8 hours  lactobacillus acidophilus 1 Tablet(s) Oral two times a day  simvastatin 20 milliGRAM(s) Oral at bedtime  sodium chloride 0.9%. 1000 milliLiter(s) (100 mL/Hr) IV Continuous <Continuous>    MEDICATIONS  (PRN):  acetaminophen   Tablet .. 975 milliGRAM(s) Oral every 6 hours PRN Mild Pain (1 - 3)      CAPILLARY BLOOD GLUCOSE        I&O's Summary    24 Aug 2021 07:01  -  25 Aug 2021 07:00  --------------------------------------------------------  IN: 0 mL / OUT: 1700 mL / NET: -1700 mL        PHYSICAL EXAM:  Vital Signs Last 24 Hrs  T(C): 36.8 (25 Aug 2021 05:08), Max: 37.6 (24 Aug 2021 14:00)  T(F): 98.3 (25 Aug 2021 05:08), Max: 99.6 (24 Aug 2021 14:00)  HR: 84 (25 Aug 2021 05:08) (84 - 96)  BP: 120/64 (25 Aug 2021 05:08) (97/57 - 132/73)  BP(mean): --  RR: 16 (25 Aug 2021 05:08) (16 - 17)  SpO2: 96% (25 Aug 2021 05:08) (96% - 98%)  GENERAL: NAD, well-developed  HEAD:  Atraumatic, Normocephalic  EYES: EOMI, conjunctiva and sclera clear  NECK: Supple, No JVD  CHEST/LUNG: Clear to auscultation bilaterally; No wheeze, decreased AE  HEART: Regular rate and rhythm; No murmurs, rubs, or gallops  ABDOMEN: Soft, nontender,  Nondistended; Bowel sounds present  EXTREMITIES:  2+ Peripheral Pulses, No clubbing, cyanosis, or edema  PSYCH: AAOx3  NEUROLOGY: non-focal  SKIN: ecchymosis on thighs and lower abdomen, penis/scrotum      LABS:                        8.2    11.03 )-----------( 505      ( 24 Aug 2021 09:51 )             25.1     08-24    128<L>  |  98  |  18  ----------------------------<  132<H>  3.9   |  22  |  0.85    Ca    8.3<L>      24 Aug 2021 09:51  Phos  2.7     08-24  Mg     2.00     08-24    TPro  6.5  /  Alb  3.5  /  TBili  1.3<H>  /  DBili  x   /  AST  49<H>  /  ALT  49<H>  /  AlkPhos  96  08-23          Urinalysis Basic - ( 23 Aug 2021 11:42 )    Color: Tamara / Appearance: Turbid / S.024 / pH: x  Gluc: x / Ketone: Negative  / Bili: Small / Urobili: 6 mg/dL   Blood: x / Protein: 100 mg/dL / Nitrite: Negative   Leuk Esterase: Large / RBC: 10 /HPF / WBC >50 /HPF   Sq Epi: x / Non Sq Epi: 1 /HPF / Bacteria: Many        Culture - Urine (collected 23 Aug 2021 14:56)  Source: Clean Catch Clean Catch (Midstream)  Preliminary Report (24 Aug 2021 10:50):    >100,000 CFU/ml Gram Negative Rods    Culture - Blood (collected 23 Aug 2021 14:37)  Source: .Blood Blood-Peripheral  Preliminary Report (24 Aug 2021 15:01):    No growth to date.    Culture - Blood (collected 23 Aug 2021 14:37)  Source: .Blood Blood-Peripheral  Preliminary Report (24 Aug 2021 15:01):    No growth to date.        RADIOLOGY & ADDITIONAL TESTS:  Results Reviewed:   Imaging Personally Reviewed:  Electrocardiogram Personally Reviewed:    COORDINATION OF CARE:  Care Discussed with Consultants/Other Providers [Y/N]:  Prior or Outpatient Records Reviewed [Y/N]:  
Dr. Alvina Norris  Pager [phone]    PROGRESS NOTE:     Patient is a 66y old  Male who presents with a chief complaint of Fever (26 Aug 2021 10:21)      SUBJECTIVE / OVERNIGHT EVENTS: pt denies chest pain/sob/abd pain, had formed stool, no diarrhea  ADDITIONAL REVIEW OF SYSTEMS: tolerating diet    MEDICATIONS  (STANDING):  aspirin  chewable 81 milliGRAM(s) Oral daily  cefTRIAXone   IVPB 1000 milliGRAM(s) IV Intermittent every 24 hours  heparin   Injectable 5000 Unit(s) SubCutaneous every 8 hours  lactobacillus acidophilus 1 Tablet(s) Oral two times a day  simvastatin 20 milliGRAM(s) Oral at bedtime    MEDICATIONS  (PRN):  acetaminophen   Tablet .. 975 milliGRAM(s) Oral every 6 hours PRN Mild Pain (1 - 3)      CAPILLARY BLOOD GLUCOSE        I&O's Summary    25 Aug 2021 07:01  -  26 Aug 2021 07:00  --------------------------------------------------------  IN: 0 mL / OUT: 2000 mL / NET: -2000 mL        PHYSICAL EXAM:  Vital Signs Last 24 Hrs  T(C): 36.7 (26 Aug 2021 09:48), Max: 36.8 (25 Aug 2021 14:30)  T(F): 98.1 (26 Aug 2021 09:48), Max: 98.3 (25 Aug 2021 14:30)  HR: 83 (26 Aug 2021 09:48) (81 - 88)  BP: 112/64 (26 Aug 2021 09:48) (100/59 - 131/63)  BP(mean): --  RR: 17 (26 Aug 2021 09:48) (17 - 17)  SpO2: 100% (26 Aug 2021 09:48) (95% - 100%)  CONSTITUTIONAL: NAD, well-developed  RESPIRATORY: Normal respiratory effort; lungs are clear to auscultation bilaterally  CARDIOVASCULAR: Regular rate and rhythm, normal S1 and S2, no murmur/rub/gallop; No lower extremity edema; Peripheral pulses are 2+ bilaterally  ABDOMEN: Nontender to palpation, normoactive bowel sounds, no rebound/guarding; ecchymosis on thighs/abdomen/scrotum  MUSCULOSKELETAL: no clubbing or cyanosis of digits; no joint swelling or tenderness to palpation  PSYCH: A+O to person, place, and time; affect appropriate    LABS:        Culture - Blood (collected 23 Aug 2021 14:37)  Source: .Blood Blood-Peripheral  Preliminary Report (24 Aug 2021 15:01):    No growth to date.    Culture - Blood (collected 23 Aug 2021 14:37)  Source: .Blood Blood-Peripheral  Preliminary Report (24 Aug 2021 15:01):    No growth to date.        RADIOLOGY & ADDITIONAL TESTS:  Results Reviewed:   Imaging Personally Reviewed:  Electrocardiogram Personally Reviewed:    COORDINATION OF CARE:  Care Discussed with Consultants/Other Providers [Y/N]: urology ALFREDO Prado dc home today on vantin x6 more days  Prior or Outpatient Records Reviewed [Y/N]:

## 2021-08-26 NOTE — DISCHARGE NOTE PROVIDER - NSDCCPCAREPLAN_GEN_ALL_CORE_FT
PRINCIPAL DISCHARGE DIAGNOSIS  Diagnosis: Urinary tract infection  Assessment and Plan of Treatment: Take antibiotic until finished  Call Dr. Scott's office to schedule a follow up appointment.  Call the office if you have fever greater than 101, difficulty urinating, pain not relieved with pain medication, nausea/vomiting.

## 2021-08-26 NOTE — DISCHARGE NOTE NURSING/CASE MANAGEMENT/SOCIAL WORK - PATIENT PORTAL LINK FT
You can access the FollowMyHealth Patient Portal offered by [facility] by registering at the following website: [email or web address]. By joining [employer]’s FollowMyHealth portal, you will also be able to view your health information using other applications (apps) compatible with our system.

## 2021-08-26 NOTE — DISCHARGE NOTE NURSING/CASE MANAGEMENT/SOCIAL WORK - NSDCPNINST_GEN_ALL_CORE
Notify Dr Scott if you experience any urinary frequency, burning pain or fever >100.5.  Drink plenty of fluids.  No heavy lifting or straining.  Complete your course of antibiotics as instructed.

## 2021-08-26 NOTE — DISCHARGE NOTE PROVIDER - NSDCMRMEDTOKEN_GEN_ALL_CORE_FT
acetaminophen 325 mg oral tablet: 3 tab(s) orally every 6 hours, As needed, For Pain   aspirin 81 mg oral tablet, chewable: 1 tab(s) orally once a day  cefpodoxime 100 mg oral tablet: 1 tab(s) orally every 12 hours   lactobacillus acidophilus oral capsule: 1 cap(s) orally 2 times a day (with meals)   simvastatin 20 mg oral tablet: 1 tab(s) orally once a day (at bedtime)

## 2021-08-26 NOTE — PROGRESS NOTE ADULT - PROBLEM SELECTOR PLAN 2
-CT c/f small bowel ileus  -diarrhea resolved, tolerating diet, had formed stool  -c/w probiotic
-CT c/f small bowel ileus, pt reports no diarrhea x48h  -Diarrhea resolved, if recurs send stool for c diff  -c/w probiotic

## 2021-08-26 NOTE — PROGRESS NOTE ADULT - PROBLEM SELECTOR PLAN 4
monitor CBC, H/H down from 10.1/29 on 8/13 to 8.4/25.9 --8.2/25.1, transfuse prn, pt has ecchymosis in thighs/abdomen/scrotum, no RP bleed on CT.
monitor CBC, H/H down from 10.1/29 on 8/13 to 8.4/25.9 --8.2/25.1, transfuse prn, pt has ecchymosis in thighs/abdomen/scrotum, no RP bleed on CT.

## 2021-08-26 NOTE — PROGRESS NOTE ADULT - PROBLEM SELECTOR PLAN 7
not on inhaler, former  smoker  lungs clear, monitor o2 sats.
not on inhaler, former  smoker  lungs clear, monitor o2 sats.

## 2021-08-26 NOTE — DISCHARGE NOTE PROVIDER - NSDCFUSCHEDAPPT_GEN_ALL_CORE_FT
ZOË KONG ; 09/30/2021 ; [facility]PP U[facility] [address] [facility]  ZOË KONG ; 10/04/2021 ; [facility] [address] [facility]  ZOË KONG ; 10/18/2021 ; NPP [facility] [address]

## 2021-08-26 NOTE — PROGRESS NOTE ADULT - ASSESSMENT
67 y/o M h/o HLD, laryngeal CA in 2018 s/p chemo and radiation therapy, recently diagnosed prostate CA and s/p robotic assisted radical prostatectomy w/ pelvic lymph node dissection on 8/10. Surgery was complicated by small bowel injury s/p repair. His post operative course was not complicated, and he was discharged on POD 3.  Lorenz was removed on 8/18    He reports that over the weekend, he experienced fever as high as 101F associated with diarrhea and anorexia.  Denies urinary symptoms including, frequency, urgency, dysuria, hematuria, incomplete emptying and fever, nausea, vomiting, chest pain, or shortness of breath.  In ED, afebrile but tachycardic to 105.   + leukocytosis to 15.67, CT 25.9 down from discharge of 29. UA with pyuria and many bacteria.      8/24 - feels better this AM; no loose stools X 24hrs; abd and scrotal ecchymosis noted, resolving  8/25 - feels better, no further loose stools, tolerating diet, Ucx GNR, Bcx NGTD  8/26 - feels good, no further diarrhea, ambulating, voiding well, tolerating diet, remains afebrile, Ucx klebsiella, Bcx NGTD    Plan:  - f/u final Bcx NGTD  - f/u medicine  - DVT prophy, IS, OOB, ambulate  - d/c home on vantin for 6 more days to f/u with Dr. Scott  
65 y/o M h/o HLD, laryngeal CA in 2018 s/p chemo and radiation therapy, recently diagnosed prostate CA and s/p robotic assisted radical prostatectomy w/ pelvic lymph node dissection on 8/10. Surgery was complicated by small bowel injury s/p repair. His post operative course was not complicated, and he was discharged on POD 3.  Lorenz was removed on 8/18    He reports that over the weekend, he experienced fever as high as 101F associated with diarrhea and anorexia.  Denies urinary symptoms including, frequency, urgency, dysuria, hematuria, incomplete emptying and fever, nausea, vomiting, chest pain, or shortness of breath.  In ED, afebrile but tachycardic to 105.   + leukocytosis to 15.67, CT 25.9 down from discharge of 29. UA with pyuria and many bacteria.      8/24 - feels better this AM; no loose stools X 24hrs; abd and scrotal ecchymosis noted, resolving  8/25 - feels better, no further loose stools, tolerating diet, Ucx GNR, Bcx NGTD    Plan:  - f/u Ucx: GNR, Bcx NGTD  - continue CTX  - IVL  - monitor for diarrhea; if recurs, send stool for C. diff  - f/u medicine  - DVT prophy, IS, OOB, ambulate  
67 y/o male with h/o HLD, laryngeal CA in 2018 s/p chemo and radiation therapy, recently diagnosed prostate CA and s/p robotic assisted radical prostatectomy w/ pelvic lymph node dissection on 8/10. Surgery was complicated by small bowel injury s/p repair. His post operative course was not complicated, and he was discharged on POD 3.  Lorenz was removed on the 18th.    He reports that over the weekend, he experienced fever as high as 101F associated with diarrhea and anorexia.  Denies urinary symptoms including, frequency, urgency, dysuria, hematuria, incomplete emptying and fever, nausea, vomiting, chest pain, or shortness of breath.  In ED, afebrile but tachycardic to 105.   + leukocytosis to 15.67, CT 25.9 down from discharge of 29. UA with pyuria and many bacteria.      8/24 - feels better this AM; no loose stools X 24hrs; abd and scrotal ecchymosis noted, resolving  Plan:  - check cultures/labs  - OOB  - monitor for diarrhea; if recurs, send stool for C. diff  - cont CTX  - med f/u
66M with h/o hld, laryngeal CA in 2018 s/p chemo and radiation, COPD (not on inhaler), mild carotid artery disease, recently diagnosed prostate CA and s/p robotic assisted radical prostatectomy w/ pelvic lymph node dissection on 8/10/21, c/b small bowel injury s/p repair, discharged home on POD3, brice removed 8/18, now p/w fever, diarrhea, found to have UTI  
66M with h/o hld, laryngeal CA in 2018 s/p chemo and radiation, COPD (not on inhaler), mild carotid artery disease, recently diagnosed prostate CA and s/p robotic assisted radical prostatectomy w/ pelvic lymph node dissection on 8/10/21, c/b small bowel injury s/p repair, discharged home on POD3, brice removed 8/18, now p/w fever, diarrhea, found to have UTI, improving on CTX

## 2021-08-28 LAB
CULTURE RESULTS: SIGNIFICANT CHANGE UP
CULTURE RESULTS: SIGNIFICANT CHANGE UP
SPECIMEN SOURCE: SIGNIFICANT CHANGE UP
SPECIMEN SOURCE: SIGNIFICANT CHANGE UP

## 2021-09-10 LAB — BACTERIA UR CULT: ABNORMAL

## 2021-09-30 ENCOUNTER — APPOINTMENT (OUTPATIENT)
Dept: UROLOGY | Facility: CLINIC | Age: 66
End: 2021-09-30
Payer: COMMERCIAL

## 2021-09-30 VITALS
SYSTOLIC BLOOD PRESSURE: 174 MMHG | HEIGHT: 72 IN | BODY MASS INDEX: 22.21 KG/M2 | WEIGHT: 164 LBS | HEART RATE: 86 BPM | TEMPERATURE: 97.6 F | DIASTOLIC BLOOD PRESSURE: 111 MMHG

## 2021-09-30 DIAGNOSIS — R39.9 UNSPECIFIED SYMPTOMS AND SIGNS INVOLVING THE GENITOURINARY SYSTEM: ICD-10-CM

## 2021-09-30 PROCEDURE — 99024 POSTOP FOLLOW-UP VISIT: CPT

## 2021-09-30 RX ORDER — CIPROFLOXACIN HYDROCHLORIDE 500 MG/1
500 TABLET, FILM COATED ORAL
Qty: 10 | Refills: 0 | Status: COMPLETED | COMMUNITY
Start: 2021-09-09 | End: 2021-09-30

## 2021-09-30 NOTE — DISEASE MANAGEMENT
[1] : T1 [c] : c [0-10] : 0 -10 ng/mL [4] : 4 [IIC] : IIC [Radical Prostatectomy] : Radical Prostatectomy [Patient had a radical prostatectomy] : Patient had a radical prostatectomy  [7(4+3)] : Linda Score 7(4+3) [Negative] : Negative margins [2] : T2 [0] : N0 [X] : MX [] : Patient had no Bone Scan performed [BiopsyDate] : 11/10/2020 [TotalCores] : 12 [TotalPositiveCores] : 3 [MaxCoreInvolvement] : 5 [CTresults] : no pelvic lymphadenopathy  December 2020  [FreeTextEntry7] : 12 core biopsy demonstrated 2 cores Linda 6(3+3), [place] 7 (4+3) involving 5%. One core high grade PIN. PSA at diagnosis October 2020 5.74 ng/mL \par May 2021 6.45 ng/mL \par \par MRI Prostate 6/4/2021: 34 mL volume, Right PZ PIRAD 4 lesion. No MARIFER, SV invasion.\par  [RadicalProstatectomyDate] : 8/10/2021 [TotalNumberofnodesresected] : 16 [PositiveNodes] : 0

## 2021-09-30 NOTE — HISTORY OF PRESENT ILLNESS
[FreeTextEntry1] : Doing well. Here for 6-week postop visit. Urinary function is excellent with virtually complete control. Following catheter removal, he did not require pads. He has occasional urinary frequency. Nocturia 2 times per night. Good urinary flow incomplete bladder doing. He has been intermittently feeling pelvic pressure just before urination with mild urgency. No abdominal pain or flank pain. His incisions are well-healed. He is tolerating a regular diet and has resumed his normal activities.\par \par Has noted partial erectile function recovery.

## 2021-09-30 NOTE — DISEASE MANAGEMENT
[1] : T1 [c] : c [0-10] : 0 -10 ng/mL [4] : 4 [IIC] : IIC [Radical Prostatectomy] : Radical Prostatectomy [Patient had a radical prostatectomy] : Patient had a radical prostatectomy  [7(4+3)] : Linda Score 7(4+3) [Negative] : Negative margins [2] : T2 [0] : N0 [X] : MX [] : Patient had no Bone Scan performed [BiopsyDate] : 11/10/2020 [TotalCores] : 12 [TotalPositiveCores] : 3 [MaxCoreInvolvement] : 5 [CTresults] : no pelvic lymphadenopathy  December 2020  [FreeTextEntry7] : 12 core biopsy demonstrated 2 cores Linda 6(3+3), [place] 7 (4+3) involving 5%. One core high grade PIN. PSA at diagnosis October 2020 5.74 ng/mL \par May 2021 6.45 ng/mL \par \par MRI Prostate 6/4/2021: 34 mL volume, Right PZ PIRAD 4 lesion. No MARIFER, SV invasion.\par  [RadicalProstatectomyDate] : 8/10/2021 [TotalNumberofnodesresected] : 16 [PositiveNodes] : 0

## 2021-10-01 LAB
APPEARANCE: CLEAR
BACTERIA: NEGATIVE
BILIRUBIN URINE: NEGATIVE
BLOOD URINE: NORMAL
COLOR: NORMAL
GLUCOSE QUALITATIVE U: NEGATIVE
HYALINE CASTS: 0 /LPF
KETONES URINE: NEGATIVE
LEUKOCYTE ESTERASE URINE: ABNORMAL
MICROSCOPIC-UA: NORMAL
NITRITE URINE: NEGATIVE
PH URINE: 7
PROTEIN URINE: NEGATIVE
PSA SERPL-MCNC: <0.01 NG/ML
RED BLOOD CELLS URINE: 5 /HPF
SPECIFIC GRAVITY URINE: 1.01
SQUAMOUS EPITHELIAL CELLS: 1 /HPF
UROBILINOGEN URINE: NORMAL
WHITE BLOOD CELLS URINE: 77 /HPF

## 2021-10-04 ENCOUNTER — APPOINTMENT (OUTPATIENT)
Dept: OTOLARYNGOLOGY | Facility: CLINIC | Age: 66
End: 2021-10-04
Payer: COMMERCIAL

## 2021-10-04 VITALS
WEIGHT: 166 LBS | HEART RATE: 94 BPM | SYSTOLIC BLOOD PRESSURE: 179 MMHG | HEIGHT: 72 IN | BODY MASS INDEX: 22.48 KG/M2 | DIASTOLIC BLOOD PRESSURE: 99 MMHG

## 2021-10-04 PROCEDURE — 31575 DIAGNOSTIC LARYNGOSCOPY: CPT

## 2021-10-04 PROCEDURE — 99214 OFFICE O/P EST MOD 30 MIN: CPT | Mod: 25

## 2021-10-04 NOTE — HISTORY OF PRESENT ILLNESS
[de-identified] : 66 year male with SCCa larynx s/p chemo/RT completed 4/2018 presents for follow up. CT chest/neck 12/2020- ABENA.\par States doing well overall. Pt denies dysphonia, dysphagia, odynophagia, SOB, otalgia, throat pain. Weight is stable. Pt is eating well. pt had prostate cancer s/p surgery 8/2021 and being f/u by Dr. Scott.

## 2021-10-04 NOTE — PROCEDURE
[None] : none [Flexible Endoscope] : examined with the flexible endoscope [Serial Number: ___] : Serial Number: [unfilled] [de-identified] : No lesions in the NPx, OPx, HPx or larynx.  Stable posttreatment changes, VC are mobile, airway patent.\par  [de-identified] : Larynx SCCa

## 2021-10-04 NOTE — ADDENDUM
[FreeTextEntry1] : Documented by Massiel Prince acting as scribe for Dr. Mandel on 10/04/2021 \par \par All Medical record entries made by the Scribe were at my, Dr. Mandel's, direction and personally dictated by me on 10/04/2021  . I have reviewed the chart and agree that the record accurately reflects my personal performance of the history, physical exam, assessment and plan. I have also personally directed, reviewed, and agreed with the discharge instructions.\par

## 2021-10-04 NOTE — PHYSICAL EXAM
[Midline] : trachea is located in midline position [Laryngoscopy Performed] : laryngoscopy was performed, see procedure section for findings [Normal] : no rashes [de-identified] : Posttreatment changes, no LAD. [FreeTextEntry1] : No concerning lesions in the OC/OPx on inspection or palpation.\par

## 2021-10-06 LAB — BACTERIA UR CULT: ABNORMAL

## 2021-10-06 RX ORDER — AMOXICILLIN AND CLAVULANATE POTASSIUM 875; 125 MG/1; MG/1
875-125 TABLET, COATED ORAL TWICE DAILY
Qty: 14 | Refills: 0 | Status: COMPLETED | COMMUNITY
Start: 2021-10-06 | End: 2021-10-13

## 2021-10-21 ENCOUNTER — TRANSCRIPTION ENCOUNTER (OUTPATIENT)
Age: 66
End: 2021-10-21

## 2021-10-27 ENCOUNTER — OUTPATIENT (OUTPATIENT)
Dept: OUTPATIENT SERVICES | Facility: HOSPITAL | Age: 66
LOS: 1 days | End: 2021-10-27
Payer: COMMERCIAL

## 2021-10-27 ENCOUNTER — APPOINTMENT (OUTPATIENT)
Dept: CT IMAGING | Facility: IMAGING CENTER | Age: 66
End: 2021-10-27
Payer: COMMERCIAL

## 2021-10-27 DIAGNOSIS — Z00.8 ENCOUNTER FOR OTHER GENERAL EXAMINATION: ICD-10-CM

## 2021-10-27 DIAGNOSIS — Z92.3 PERSONAL HISTORY OF IRRADIATION: Chronic | ICD-10-CM

## 2021-10-27 DIAGNOSIS — C32.9 MALIGNANT NEOPLASM OF LARYNX, UNSPECIFIED: ICD-10-CM

## 2021-10-27 DIAGNOSIS — Z98.890 OTHER SPECIFIED POSTPROCEDURAL STATES: Chronic | ICD-10-CM

## 2021-10-27 DIAGNOSIS — Z92.21 PERSONAL HISTORY OF ANTINEOPLASTIC CHEMOTHERAPY: Chronic | ICD-10-CM

## 2021-10-27 PROCEDURE — 82565 ASSAY OF CREATININE: CPT

## 2021-10-27 PROCEDURE — 70491 CT SOFT TISSUE NECK W/DYE: CPT | Mod: 26

## 2021-10-27 PROCEDURE — 70491 CT SOFT TISSUE NECK W/DYE: CPT

## 2021-10-27 PROCEDURE — 71260 CT THORAX DX C+: CPT

## 2021-10-27 PROCEDURE — 71260 CT THORAX DX C+: CPT | Mod: 26

## 2021-11-15 ENCOUNTER — APPOINTMENT (OUTPATIENT)
Dept: PULMONOLOGY | Facility: CLINIC | Age: 66
End: 2021-11-15
Payer: COMMERCIAL

## 2021-11-15 VITALS
OXYGEN SATURATION: 96 % | RESPIRATION RATE: 16 BRPM | HEART RATE: 99 BPM | TEMPERATURE: 98 F | DIASTOLIC BLOOD PRESSURE: 89 MMHG | SYSTOLIC BLOOD PRESSURE: 184 MMHG

## 2021-11-15 DIAGNOSIS — R63.4 ABNORMAL WEIGHT LOSS: ICD-10-CM

## 2021-11-15 PROCEDURE — 90686 IIV4 VACC NO PRSV 0.5 ML IM: CPT

## 2021-11-15 PROCEDURE — G0008: CPT

## 2021-11-15 PROCEDURE — 99213 OFFICE O/P EST LOW 20 MIN: CPT | Mod: 25

## 2021-11-16 NOTE — PROCEDURE
[FreeTextEntry1] : flu shot given\par \par ct 10/27/21 reviewed no change\par \par 07/30/2021\par Pulmonary function testing\par These data demonstrate a mild obstructive ventilatory deficit. There is evidence of mild hyperinflation. There is a moderate diffusion impairment. \par PFT attached relatively stable function.\par

## 2021-11-16 NOTE — DISCUSSION/SUMMARY
[FreeTextEntry1] : Prostate cancer s/p prostatectomy \par Mild COPD.\par Asymptomatic SMA stenosis.\par Mild carotid disease.\par Hyperlipidemia.\par History of laryngeal CA.\par \par

## 2021-11-16 NOTE — ASSESSMENT
[FreeTextEntry1] : recommend COVID booster\par \par Medications reviewed and renewed.\par Labs drawn in office today\par PE 4/22\par Repeat CAT scan in 1 year.\par \par

## 2021-11-16 NOTE — HISTORY OF PRESENT ILLNESS
[TextBox_4] : had prostatectomy and was hospitalized  1 week later for urinary tract infections\par now weight 164 wants to get to 170\par  breathing well\par  needs flu shot\par \par \par had ct recently\par \par PRN smoking e cigarettes\par

## 2021-11-23 ENCOUNTER — APPOINTMENT (OUTPATIENT)
Dept: PULMONOLOGY | Facility: CLINIC | Age: 66
End: 2021-11-23
Payer: COMMERCIAL

## 2021-11-23 VITALS
HEART RATE: 89 BPM | TEMPERATURE: 98.1 F | OXYGEN SATURATION: 97 % | SYSTOLIC BLOOD PRESSURE: 166 MMHG | DIASTOLIC BLOOD PRESSURE: 94 MMHG

## 2021-11-23 PROCEDURE — 90750 HZV VACC RECOMBINANT IM: CPT

## 2021-11-23 PROCEDURE — 90471 IMMUNIZATION ADMIN: CPT

## 2022-02-01 ENCOUNTER — APPOINTMENT (OUTPATIENT)
Dept: VASCULAR SURGERY | Facility: CLINIC | Age: 67
End: 2022-02-01
Payer: COMMERCIAL

## 2022-02-01 VITALS
WEIGHT: 165 LBS | BODY MASS INDEX: 21.87 KG/M2 | TEMPERATURE: 97.9 F | DIASTOLIC BLOOD PRESSURE: 99 MMHG | HEART RATE: 78 BPM | HEIGHT: 73 IN | SYSTOLIC BLOOD PRESSURE: 153 MMHG

## 2022-02-01 PROCEDURE — 93976 VASCULAR STUDY: CPT

## 2022-02-01 PROCEDURE — 99213 OFFICE O/P EST LOW 20 MIN: CPT

## 2022-02-01 NOTE — HISTORY OF PRESENT ILLNESS
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 No diarrhea or constipation. No history of food fear or recent weight loss. He states that he lost weight when he underwent chemotherapy but since then has regained that weight back. \par He is a former smoker (>1ppd for 35 years) but still smokes 4 ecigarettes per day.\par He has stopped alcohol use at the time of his cancer diagnosis. He does not use any other substances. \par \par 1/28/21 pt presents today for follow up regarding SMA stenosis. He remains asymptomatic and denies any new complaints or changes. No recent weight loss or abdominal pain. He is still smoking e-cigarettes. He has not been taking ASA 81mg. \par \par 7/29/2021 - Pt presents for follow up. Patient is scheduled for prostate surgery on August 10, 2021. Here for vascular evaluation prior to surgery. \par Denies lower extremity symptoms - no symptoms of claudication or pain. Reports having been active all summer without issues. \par In regards to SMA stenosis, remains asymptomatic. No postprandial pain. No nausea or vomiting, no changes to bowel habits. \par In regards to carotid stenosis, remains asymptomatic. No episodes of unilateral numbness or weakness, no motor or sensory loss. No amaurosis. Denies symptoms of stroke or TIA.  [de-identified] : 2/1/2022-Pt presents for follow up. Since last visit, patient underwent prostate surgery and has recovered. Denies nausea or vomiting. No post-prandial pain. Eating well without issues. No recent weight loss. No episodes of unilateral or weakness, no motor or sensory loss. No amaurosis fugax. No slurred speech. No stroke or TIA symptoms. Continues to smoke e-cigarettes intermittently.

## 2022-02-01 NOTE — ASSESSMENT
[FreeTextEntry1] : 66 year old man with history of squamous ca of glottis s/p chemoradiation, here for evaluation of asymptomatic SMA stenosis. \par \par 1. Asymptomatic SMA stenosis\par - No urgent surgical intervention is needed at this time as patient is asymptomatic.\par - Patient educated on symptoms of mesenteric ischemia.\par - Recommend medical management and optimization. Continue to take aspirin and simvastatin. \par - Follow up in 1 year for repeat mesenteric duplex (2/2023)\par \par 2. Mild carotid stenosis\par - Repeat duplex with < 50% stenosis bilaterally. \par - Repeat duplex in 1 year (7/2022)\par \par 3. PAD\par - Asymptomatic\par - Will obtain baseline DANA/PVRs at next visit.

## 2022-02-01 NOTE — PHYSICAL EXAM
[JVD] : no jugular venous distention  [Carotid Bruits] : no carotid bruits [Normal Breath Sounds] : Normal breath sounds [Respiratory Effort] : normal respiratory effort [Normal Heart Sounds] : normal heart sounds [Normal Rate and Rhythm] : normal rate and rhythm [Femoral Arteries] : Normal femoral pulses [Right Carotid Bruit] : no bruit heard over the right carotid [Left Carotid Bruit] : no bruit heard over the left carotid [2+] : left 2+ [0] : left 0 [Ankle Swelling (On Exam)] : not present [No Rash or Lesion] : No rash or lesion [Calm] : calm [de-identified] : Well-appearing  [de-identified] : EOMI, anicteric, CN II-XII grossly intact [de-identified] : Supple [de-identified] : soft, nt, nd, no abd bruit [de-identified] : motor and sensation intact in all four extremities  [de-identified] : A&Ox4

## 2022-02-01 NOTE — DATA REVIEWED
[FreeTextEntry1] : 2/1/2022-Mesenteric duplex.\par >70% SMA stenosis.\par ----------------------------\par 7/29/2021-Mesenteric duplex\par Technically difficult due to bowel gas.\par > 70% SMA stenosis.\par \par 7/29/2021- Carotid duplex\par BL Carotid < 50% stenosis. antegrade vertebral flow. \par ---------------------------------------------\par 1/28/21- Mesenteric Duplex \par Technically difficult due to bowel gas\par Evidence of stenosis of SMA 70-99%\par ----------------------------------------------\par 7/23/2020 - Carotid duplex\par BLE < 50% ICA stenosis\par \par 7/23/2020 - Mesenteric duplex \par Technically difficult due to bowel gas.\par Suspected >70% stenosis SMA,

## 2022-02-07 ENCOUNTER — APPOINTMENT (OUTPATIENT)
Dept: OTOLARYNGOLOGY | Facility: CLINIC | Age: 67
End: 2022-02-07
Payer: COMMERCIAL

## 2022-02-07 VITALS — SYSTOLIC BLOOD PRESSURE: 161 MMHG | DIASTOLIC BLOOD PRESSURE: 99 MMHG | HEART RATE: 75 BPM | HEIGHT: 73 IN

## 2022-02-07 VITALS — WEIGHT: 176.8 LBS | BODY MASS INDEX: 23.33 KG/M2

## 2022-02-07 PROCEDURE — 31575 DIAGNOSTIC LARYNGOSCOPY: CPT

## 2022-02-07 PROCEDURE — 99214 OFFICE O/P EST MOD 30 MIN: CPT | Mod: 25

## 2022-02-07 NOTE — PROCEDURE
[None] : none [Flexible Endoscope] : examined with the flexible endoscope [Serial Number: ___] : Serial Number: [unfilled] [de-identified] : No lesions in the NPx, OPx, HPx or larynx.  Stable posttreatment changes, VC are mobile, airway patent.\par  [de-identified] : larynx SCCa

## 2022-02-07 NOTE — PHYSICAL EXAM
[Midline] : trachea is located in midline position [Laryngoscopy Performed] : laryngoscopy was performed, see procedure section for findings [Normal] : no rashes [de-identified] : Posttreatment changes, no LAD. [FreeTextEntry1] : No concerning lesions in the OC/OPx on inspection or palpation.\par

## 2022-02-07 NOTE — HISTORY OF PRESENT ILLNESS
[de-identified] : 66 year old male presents for follow up for larynx cancer s/p chemo/RT completed 4/2018 presents for follow up.States doing, eating well no complains. Patient denies dysphagia, odynophagia, dyspnea, dysphonia, night sweats, chills, fevers, or otalgia

## 2022-02-09 ENCOUNTER — NON-APPOINTMENT (OUTPATIENT)
Age: 67
End: 2022-02-09

## 2022-04-04 ENCOUNTER — NON-APPOINTMENT (OUTPATIENT)
Age: 67
End: 2022-04-04

## 2022-04-04 ENCOUNTER — LABORATORY RESULT (OUTPATIENT)
Age: 67
End: 2022-04-04

## 2022-04-04 ENCOUNTER — APPOINTMENT (OUTPATIENT)
Dept: PULMONOLOGY | Facility: CLINIC | Age: 67
End: 2022-04-04
Payer: MEDICARE

## 2022-04-04 VITALS — TEMPERATURE: 98.6 F | BODY MASS INDEX: 22.3 KG/M2 | HEART RATE: 93 BPM | WEIGHT: 169 LBS | OXYGEN SATURATION: 98 %

## 2022-04-04 VITALS — SYSTOLIC BLOOD PRESSURE: 160 MMHG | DIASTOLIC BLOOD PRESSURE: 90 MMHG

## 2022-04-04 DIAGNOSIS — Z23 ENCOUNTER FOR IMMUNIZATION: ICD-10-CM

## 2022-04-04 DIAGNOSIS — R79.89 OTHER SPECIFIED ABNORMAL FINDINGS OF BLOOD CHEMISTRY: ICD-10-CM

## 2022-04-04 DIAGNOSIS — R91.8 OTHER NONSPECIFIC ABNORMAL FINDING OF LUNG FIELD: ICD-10-CM

## 2022-04-04 LAB
ALBUMIN: 10
BILIRUB UR QL STRIP: NEGATIVE
CLARITY UR: CLEAR
COLLECTION METHOD: NORMAL
CREATININE: 50
GLUCOSE UR-MCNC: NEGATIVE
HCG UR QL: 0.2 EU/DL
HGB UR QL STRIP.AUTO: NORMAL
KETONES UR-MCNC: NEGATIVE
LEUKOCYTE ESTERASE UR QL STRIP: NORMAL
MICROALBUMIN/CREAT UR TEST STR-RTO: NORMAL
NITRITE UR QL STRIP: NEGATIVE
PH UR STRIP: 7
PROT UR STRIP-MCNC: NEGATIVE
SP GR UR STRIP: 1.01

## 2022-04-04 PROCEDURE — 99396 PREV VISIT EST AGE 40-64: CPT | Mod: 25

## 2022-04-04 PROCEDURE — 90677 PCV20 VACCINE IM: CPT

## 2022-04-04 PROCEDURE — G0009: CPT

## 2022-04-04 PROCEDURE — 93000 ELECTROCARDIOGRAM COMPLETE: CPT

## 2022-04-04 PROCEDURE — 36415 COLL VENOUS BLD VENIPUNCTURE: CPT

## 2022-04-04 PROCEDURE — 82044 UR ALBUMIN SEMIQUANTITATIVE: CPT | Mod: QW

## 2022-04-04 PROCEDURE — 81003 URINALYSIS AUTO W/O SCOPE: CPT | Mod: QW

## 2022-04-04 NOTE — PHYSICAL EXAM
[No Acute Distress] : no acute distress [Supple] : supple [No JVD] : no jvd [Normal S1, S2] : normal s1, s2 [No Murmurs] : no murmurs [Clear to Auscultation Bilaterally] : clear to auscultation bilaterally [Normal to Percussion] : normal to percussion [Benign] : benign [No HSM] : no hsm [No Clubbing] : no clubbing [No Cyanosis] : no cyanosis [No Edema] : no edema [No Focal Deficits] : no focal deficits [Oriented x3] : oriented x3 [No Abnormalities] : no abnormalities

## 2022-04-04 NOTE — DISCUSSION/SUMMARY
[FreeTextEntry1] : Prostate cancer s/p prostatectomy \par Mild COPD.\par Asymptomatic SMA stenosis.\par Mild carotid disease.\par Hyperlipidemia.\par History of laryngeal CA.\par Hypertension with recurrent elevation in blood pressure.\par \par

## 2022-04-04 NOTE — ASSESSMENT
[FreeTextEntry1] : Cardiology reevaluation.\par Start Losartan 50 and check blood pressure at home and here in 3 months.  Patient will maintain log.\par Medications reviewed and renewed.\par Labs drawn in office today\par F/u PFT and appt 3 months\par Repeat CAT scan in 1 year. Oct 22\par \par

## 2022-04-04 NOTE — HISTORY OF PRESENT ILLNESS
[TextBox_4] : Colonoscopy 2021\par Optho due to go\par Derm never\par \par sees Uro s/p prostatectomy \par b/p elevated in office and has been in other MD offices\par got COVID booster\par no c/o\par \par saw Dr Rehman 2015 had stress test\par sees vascular had AAA screening carotid 7/21  \par \par PRN use of e cigarette\par \par has gained weight [TextBox_11] : 1 [TextBox_13] : 40 [YearQuit] : 2018

## 2022-04-04 NOTE — PROCEDURE
[FreeTextEntry1] : Ekg\par \par Venipuncture for labs\par \par \par ct 10/27/21 reviewed no change\par \par 07/30/2021\par Pulmonary function testing\par These data demonstrate a mild obstructive ventilatory deficit. There is evidence of mild hyperinflation. There is a moderate diffusion impairment. \par PFT attached relatively stable function.\par

## 2022-04-06 LAB
25(OH)D3 SERPL-MCNC: 45 NG/ML
ALBUMIN SERPL ELPH-MCNC: 4.9 G/DL
ALP BLD-CCNC: 72 U/L
ALT SERPL-CCNC: 21 U/L
ANION GAP SERPL CALC-SCNC: 13 MMOL/L
APPEARANCE: CLEAR
AST SERPL-CCNC: 30 U/L
BACTERIA: NEGATIVE
BASOPHILS # BLD AUTO: 0.09 K/UL
BASOPHILS NFR BLD AUTO: 1.4 %
BILIRUB DIRECT SERPL-MCNC: 0.2 MG/DL
BILIRUB INDIRECT SERPL-MCNC: 0.5 MG/DL
BILIRUB SERPL-MCNC: 0.7 MG/DL
BILIRUBIN URINE: NEGATIVE
BLOOD URINE: NEGATIVE
BUN SERPL-MCNC: 18 MG/DL
CALCIUM SERPL-MCNC: 9.5 MG/DL
CHLORIDE SERPL-SCNC: 99 MMOL/L
CHOLEST SERPL-MCNC: 166 MG/DL
CO2 SERPL-SCNC: 24 MMOL/L
COLOR: NORMAL
CREAT SERPL-MCNC: 0.97 MG/DL
EGFR: 87 ML/MIN/1.73M2
EOSINOPHIL # BLD AUTO: 0.4 K/UL
EOSINOPHIL NFR BLD AUTO: 6.3 %
ESTIMATED AVERAGE GLUCOSE: 114 MG/DL
GLUCOSE QUALITATIVE U: NEGATIVE
GLUCOSE SERPL-MCNC: 92 MG/DL
HBA1C MFR BLD HPLC: 5.6 %
HCT VFR BLD CALC: 51.2 %
HDLC SERPL-MCNC: 48 MG/DL
HGB BLD-MCNC: 17.3 G/DL
HYALINE CASTS: 0 /LPF
IMM GRANULOCYTES NFR BLD AUTO: 0.3 %
KETONES URINE: NEGATIVE
LDLC SERPL CALC-MCNC: 105 MG/DL
LEUKOCYTE ESTERASE URINE: ABNORMAL
LYMPHOCYTES # BLD AUTO: 0.73 K/UL
LYMPHOCYTES NFR BLD AUTO: 11.5 %
MAN DIFF?: NORMAL
MCHC RBC-ENTMCNC: 33.3 PG
MCHC RBC-ENTMCNC: 33.8 GM/DL
MCV RBC AUTO: 98.7 FL
MICROSCOPIC-UA: NORMAL
MONOCYTES # BLD AUTO: 0.51 K/UL
MONOCYTES NFR BLD AUTO: 8 %
NEUTROPHILS # BLD AUTO: 4.61 K/UL
NEUTROPHILS NFR BLD AUTO: 72.5 %
NITRITE URINE: NEGATIVE
NONHDLC SERPL-MCNC: 119 MG/DL
PH URINE: 7
PLATELET # BLD AUTO: 246 K/UL
POTASSIUM SERPL-SCNC: 4.9 MMOL/L
PROT SERPL-MCNC: 7.1 G/DL
PROTEIN URINE: NEGATIVE
PSA SERPL-MCNC: <0.01 NG/ML
RBC # BLD: 5.19 M/UL
RBC # FLD: 11.6 %
RED BLOOD CELLS URINE: 2 /HPF
SODIUM SERPL-SCNC: 136 MMOL/L
SPECIFIC GRAVITY URINE: 1.01
SQUAMOUS EPITHELIAL CELLS: 0 /HPF
T3 SERPL-MCNC: 95 NG/DL
T3RU NFR SERPL: 1.1 TBI
T4 FREE SERPL-MCNC: 1.1 NG/DL
T4 SERPL-MCNC: 6.4 UG/DL
THYROGLOB AB SERPL-ACNC: <20 IU/ML
THYROPEROXIDASE AB SERPL IA-ACNC: 22.8 IU/ML
TRIGL SERPL-MCNC: 69 MG/DL
TSH SERPL-ACNC: 5.29 UIU/ML
UROBILINOGEN URINE: NORMAL
WBC # FLD AUTO: 6.36 K/UL
WHITE BLOOD CELLS URINE: 140 /HPF

## 2022-04-11 LAB — BACTERIA UR CULT: ABNORMAL

## 2022-04-18 RX ORDER — AMOXICILLIN AND CLAVULANATE POTASSIUM 875; 125 MG/1; MG/1
875-125 TABLET, COATED ORAL
Qty: 14 | Refills: 0 | Status: COMPLETED | COMMUNITY
Start: 2022-04-08 | End: 2022-04-18

## 2022-05-23 ENCOUNTER — APPOINTMENT (OUTPATIENT)
Dept: UROLOGY | Facility: CLINIC | Age: 67
End: 2022-05-23

## 2022-07-07 ENCOUNTER — RX RENEWAL (OUTPATIENT)
Age: 67
End: 2022-07-07

## 2022-07-11 ENCOUNTER — APPOINTMENT (OUTPATIENT)
Dept: PULMONOLOGY | Facility: CLINIC | Age: 67
End: 2022-07-11

## 2022-08-02 ENCOUNTER — APPOINTMENT (OUTPATIENT)
Dept: VASCULAR SURGERY | Facility: CLINIC | Age: 67
End: 2022-08-02

## 2022-08-09 ENCOUNTER — APPOINTMENT (OUTPATIENT)
Dept: OTOLARYNGOLOGY | Facility: CLINIC | Age: 67
End: 2022-08-09

## 2022-08-15 ENCOUNTER — APPOINTMENT (OUTPATIENT)
Dept: OTOLARYNGOLOGY | Facility: CLINIC | Age: 67
End: 2022-08-15

## 2022-08-15 VITALS
SYSTOLIC BLOOD PRESSURE: 175 MMHG | DIASTOLIC BLOOD PRESSURE: 99 MMHG | BODY MASS INDEX: 22.53 KG/M2 | WEIGHT: 170 LBS | HEIGHT: 73 IN | HEART RATE: 67 BPM

## 2022-08-15 PROCEDURE — 99214 OFFICE O/P EST MOD 30 MIN: CPT | Mod: 25

## 2022-08-15 PROCEDURE — 31575 DIAGNOSTIC LARYNGOSCOPY: CPT

## 2022-08-15 NOTE — HISTORY OF PRESENT ILLNESS
[de-identified] : 67 year old male presents for follow up for larynx cancer.  Chemo/ RT completed 4/2018. Patient denies throat infections, dysphagia, odynophagia, dyspnea, dysphonia, night sweats, chills, fevers, otalgia. Patient’s blood pressure was 175 / 99  on exam. Pt was advised to follow up with PCP for high blood pressure.\par

## 2022-08-15 NOTE — PHYSICAL EXAM
[Midline] : trachea is located in midline position [Laryngoscopy Performed] : laryngoscopy was performed, see procedure section for findings [Normal] : no rashes [de-identified] : Posttreatment changes, no LAD. [FreeTextEntry1] : No concerning lesions in the OC/OPx on inspection or palpation.\par

## 2022-08-15 NOTE — PROCEDURE
[None] : none [Flexible Endoscope] : examined with the flexible endoscope [Serial Number: ___] : Serial Number: [unfilled] [de-identified] : No lesions in the NPx, OPx, HPx or larynx.  Expected posttreatment changes, VC are mobile, airway patent.\par  [de-identified] : larynx SCCa

## 2022-09-22 ENCOUNTER — RX RENEWAL (OUTPATIENT)
Age: 67
End: 2022-09-22

## 2022-10-03 ENCOUNTER — RX RENEWAL (OUTPATIENT)
Age: 67
End: 2022-10-03

## 2022-10-17 ENCOUNTER — APPOINTMENT (OUTPATIENT)
Dept: UROLOGY | Facility: CLINIC | Age: 67
End: 2022-10-17

## 2022-10-17 ENCOUNTER — OUTPATIENT (OUTPATIENT)
Dept: OUTPATIENT SERVICES | Facility: HOSPITAL | Age: 67
LOS: 1 days | End: 2022-10-17
Payer: COMMERCIAL

## 2022-10-17 ENCOUNTER — APPOINTMENT (OUTPATIENT)
Dept: CT IMAGING | Facility: IMAGING CENTER | Age: 67
End: 2022-10-17
Payer: COMMERCIAL

## 2022-10-17 VITALS
DIASTOLIC BLOOD PRESSURE: 112 MMHG | TEMPERATURE: 98.6 F | WEIGHT: 170 LBS | SYSTOLIC BLOOD PRESSURE: 185 MMHG | HEART RATE: 94 BPM | BODY MASS INDEX: 23.03 KG/M2 | HEIGHT: 72 IN

## 2022-10-17 DIAGNOSIS — Z98.890 OTHER SPECIFIED POSTPROCEDURAL STATES: Chronic | ICD-10-CM

## 2022-10-17 DIAGNOSIS — C32.9 MALIGNANT NEOPLASM OF LARYNX, UNSPECIFIED: ICD-10-CM

## 2022-10-17 DIAGNOSIS — Z92.21 PERSONAL HISTORY OF ANTINEOPLASTIC CHEMOTHERAPY: Chronic | ICD-10-CM

## 2022-10-17 DIAGNOSIS — Z92.3 PERSONAL HISTORY OF IRRADIATION: Chronic | ICD-10-CM

## 2022-10-17 PROCEDURE — 71260 CT THORAX DX C+: CPT | Mod: 26

## 2022-10-17 PROCEDURE — 99024 POSTOP FOLLOW-UP VISIT: CPT

## 2022-10-17 PROCEDURE — 70491 CT SOFT TISSUE NECK W/DYE: CPT | Mod: 26

## 2022-10-17 PROCEDURE — 71260 CT THORAX DX C+: CPT

## 2022-10-17 PROCEDURE — 70491 CT SOFT TISSUE NECK W/DYE: CPT

## 2022-10-17 NOTE — LETTER BODY
[FreeTextEntry1] : Aramis Bradford MD\par [address]\par [address]\[place] [address]\par [phone]\par \par Dear Dr. Bradford,\par \par Reason for visit: Prostate cancer. \par \par This is a 67 year-old gentleman with prostate cancer. His prostate biopsy in Nov 2020 demonstrated Linda 6/7 adenocarcinoma of the prostate involving 3 of 12 cores.The patient returns today for follow-up. Since his last visit, patient underwent surgery 2 years ago. Patient's PSA is undetectable. Patient is continent and does not wear pads. He is able to have spontaneous partial erections. Patient reports some dysuria. He denies any pain or hematuria or urinary incontinence. Patient denies any changes in health. The past medical history and family history and social history are unchanged. All other review of systems are negative. Patient denies any changes in medications. Medication list was reconciled.\par \par On examination, the patient is a healthy-appearing gentleman in no acute distress. He is alert and oriented follows commands. He has normal mood and affect. He is normocephalic. Neck is supple. Oral no thrush Respirations are unlabored. His abdomen is soft and nontender. Bladder is nonpalpable. No CVA tenderness. Neurologically he is grossly intact. No peripheral edema. Skin without gross abnormality. He has normal male external genitalia. Normal meatus. Bilateral testes are descended intrascrotally and normal to palpation. On rectal examination, there is normal sphincter tone. The prostate is clinically benign without focal induration or nodularity.\par \par Assessment: Prostate cancer.\par \par I counseled the patient. in terms of his prostate cancer, patient underwent surgery. The risks and benefits were discussed. Alternatives were given. I answered the patient questions. The patient will repeat BMP and PSA today to ensure stability. I recommended the patient repeat Urinalysis to ensure stability. Risks and alternatives were discussed. I answered the patient questions. The patient will follow-up as directed and will contact me with any questions or concerns. Thank you for the opportunity to participate in the care of Mr. KONG. I will keep you updated on his progress.\par \par Plan: BMP. PSA. Repeat urinalysis. Urine cytology. Urine culture. Chlamydia/GC Amplification. Follow up in 1 year.

## 2022-10-17 NOTE — ADDENDUM
[FreeTextEntry1] : Entered by Roberto Swan, acting as scribe for Dr. Octavio Crooks.\par The documentation recorded by the scribe accurately reflects the service I personally performed and the decisions made by me.

## 2022-10-19 LAB
ANION GAP SERPL CALC-SCNC: 14 MMOL/L
APPEARANCE: CLEAR
BACTERIA UR CULT: NORMAL
BACTERIA: NEGATIVE
BILIRUBIN URINE: NEGATIVE
BLOOD URINE: NEGATIVE
BUN SERPL-MCNC: 16 MG/DL
C TRACH RRNA SPEC QL NAA+PROBE: NOT DETECTED
CALCIUM SERPL-MCNC: 9.3 MG/DL
CHLORIDE SERPL-SCNC: 96 MMOL/L
CO2 SERPL-SCNC: 25 MMOL/L
COLOR: NORMAL
CREAT SERPL-MCNC: 1 MG/DL
EGFR: 84 ML/MIN/1.73M2
GLUCOSE QUALITATIVE U: NEGATIVE
GLUCOSE SERPL-MCNC: 87 MG/DL
HYALINE CASTS: 0 /LPF
KETONES URINE: NEGATIVE
LEUKOCYTE ESTERASE URINE: NEGATIVE
MICROSCOPIC-UA: NORMAL
N GONORRHOEA RRNA SPEC QL NAA+PROBE: NOT DETECTED
NITRITE URINE: NEGATIVE
PH URINE: 7.5
POTASSIUM SERPL-SCNC: 4.2 MMOL/L
PROTEIN URINE: NEGATIVE
PSA SERPL-MCNC: <0.01 NG/ML
RED BLOOD CELLS URINE: 0 /HPF
SODIUM SERPL-SCNC: 135 MMOL/L
SOURCE AMPLIFICATION: NORMAL
SPECIFIC GRAVITY URINE: 1.02
SQUAMOUS EPITHELIAL CELLS: 0 /HPF
UROBILINOGEN URINE: NORMAL
WHITE BLOOD CELLS URINE: 0 /HPF

## 2022-10-20 ENCOUNTER — NON-APPOINTMENT (OUTPATIENT)
Age: 67
End: 2022-10-20

## 2022-10-22 LAB — URINE CYTOLOGY: NORMAL

## 2023-01-03 ENCOUNTER — APPOINTMENT (OUTPATIENT)
Dept: OTOLARYNGOLOGY | Facility: CLINIC | Age: 68
End: 2023-01-03
Payer: COMMERCIAL

## 2023-01-03 VITALS
BODY MASS INDEX: 23.03 KG/M2 | SYSTOLIC BLOOD PRESSURE: 200 MMHG | WEIGHT: 170 LBS | HEIGHT: 72 IN | DIASTOLIC BLOOD PRESSURE: 125 MMHG | HEART RATE: 89 BPM

## 2023-01-03 DIAGNOSIS — H92.09 OTALGIA, UNSPECIFIED EAR: ICD-10-CM

## 2023-01-03 PROCEDURE — 31575 DIAGNOSTIC LARYNGOSCOPY: CPT

## 2023-01-03 PROCEDURE — 99214 OFFICE O/P EST MOD 30 MIN: CPT | Mod: 25

## 2023-01-03 NOTE — PHYSICAL EXAM
[Midline] : trachea is located in midline position [Laryngoscopy Performed] : laryngoscopy was performed, see procedure section for findings [Normal] : no rashes [de-identified] : Posttreatment changes, no LAD. [FreeTextEntry1] : No concerning lesions in the OC/OPx on inspection or palpation.\par

## 2023-01-03 NOTE — PROCEDURE
[Hoarseness] : hoarseness not clearly evaluated by indirect laryngoscopy [None] : none [Flexible Endoscope] : examined with the flexible endoscope [Serial Number: ___] : Serial Number: [unfilled] [de-identified] : No lesions in the NPx, OPx, HPx or larynx.  Expected posttreatment changes, VC are mobile, airway patent.\par  [de-identified] : larynx SCCa

## 2023-01-03 NOTE — HISTORY OF PRESENT ILLNESS
[de-identified] : 67 year old male presents for follow up for larynx cancer.  Chemo/ RT completed 4/2018. last ct of neck and chest on 10/17/2022 [facility]. Reports dysphonia started 3 weeks ago, gargled with salt water with no improvement. Denies throat pain, throat infections, dysphagia, odynophagia, dyspnea. Reports intermittent left otalgia for the past 2 week. Denies otorrhea or changes in hearing. Denies night sweats, chills, fevers. Patient’s blood pressure was 200/125 on exam, states gets very nervous when he's in a doctor's office. Pt was advised to follow up with PCP for high blood pressure.\par

## 2023-01-05 ENCOUNTER — APPOINTMENT (OUTPATIENT)
Dept: CT IMAGING | Facility: IMAGING CENTER | Age: 68
End: 2023-01-05
Payer: COMMERCIAL

## 2023-01-05 ENCOUNTER — OUTPATIENT (OUTPATIENT)
Dept: OUTPATIENT SERVICES | Facility: HOSPITAL | Age: 68
LOS: 1 days | End: 2023-01-05
Payer: COMMERCIAL

## 2023-01-05 DIAGNOSIS — Z98.890 OTHER SPECIFIED POSTPROCEDURAL STATES: Chronic | ICD-10-CM

## 2023-01-05 DIAGNOSIS — Z92.3 PERSONAL HISTORY OF IRRADIATION: Chronic | ICD-10-CM

## 2023-01-05 DIAGNOSIS — C32.9 MALIGNANT NEOPLASM OF LARYNX, UNSPECIFIED: ICD-10-CM

## 2023-01-05 DIAGNOSIS — H92.09 OTALGIA, UNSPECIFIED EAR: ICD-10-CM

## 2023-01-05 DIAGNOSIS — Z92.21 PERSONAL HISTORY OF ANTINEOPLASTIC CHEMOTHERAPY: Chronic | ICD-10-CM

## 2023-01-05 DIAGNOSIS — R49.0 DYSPHONIA: ICD-10-CM

## 2023-01-05 PROCEDURE — 71260 CT THORAX DX C+: CPT

## 2023-01-05 PROCEDURE — 71260 CT THORAX DX C+: CPT | Mod: 26,MH

## 2023-01-05 PROCEDURE — 70491 CT SOFT TISSUE NECK W/DYE: CPT | Mod: 26,MH

## 2023-01-05 PROCEDURE — 70491 CT SOFT TISSUE NECK W/DYE: CPT

## 2023-01-17 ENCOUNTER — APPOINTMENT (OUTPATIENT)
Dept: PULMONOLOGY | Facility: CLINIC | Age: 68
End: 2023-01-17
Payer: COMMERCIAL

## 2023-01-17 VITALS
BODY MASS INDEX: 23.03 KG/M2 | WEIGHT: 170 LBS | OXYGEN SATURATION: 97 % | HEART RATE: 98 BPM | SYSTOLIC BLOOD PRESSURE: 162 MMHG | DIASTOLIC BLOOD PRESSURE: 97 MMHG | HEIGHT: 72 IN | RESPIRATION RATE: 16 BRPM

## 2023-01-17 LAB — POCT - HEMOGLOBIN (HGB), QUANTITATIVE, TRANSCUTANEOUS: 14.1

## 2023-01-17 PROCEDURE — 88738 HGB QUANT TRANSCUTANEOUS: CPT

## 2023-01-17 PROCEDURE — 94727 GAS DIL/WSHOT DETER LNG VOL: CPT

## 2023-01-17 PROCEDURE — 94010 BREATHING CAPACITY TEST: CPT

## 2023-01-17 PROCEDURE — 94729 DIFFUSING CAPACITY: CPT

## 2023-01-26 ENCOUNTER — OUTPATIENT (OUTPATIENT)
Dept: OUTPATIENT SERVICES | Facility: HOSPITAL | Age: 68
LOS: 1 days | End: 2023-01-26
Payer: COMMERCIAL

## 2023-01-26 VITALS
WEIGHT: 175.93 LBS | HEIGHT: 71.65 IN | RESPIRATION RATE: 16 BRPM | TEMPERATURE: 98 F | OXYGEN SATURATION: 99 % | HEART RATE: 80 BPM | DIASTOLIC BLOOD PRESSURE: 80 MMHG | SYSTOLIC BLOOD PRESSURE: 120 MMHG

## 2023-01-26 DIAGNOSIS — Z92.3 PERSONAL HISTORY OF IRRADIATION: Chronic | ICD-10-CM

## 2023-01-26 DIAGNOSIS — Z98.890 OTHER SPECIFIED POSTPROCEDURAL STATES: Chronic | ICD-10-CM

## 2023-01-26 DIAGNOSIS — C32.9 MALIGNANT NEOPLASM OF LARYNX, UNSPECIFIED: ICD-10-CM

## 2023-01-26 DIAGNOSIS — Z90.79 ACQUIRED ABSENCE OF OTHER GENITAL ORGAN(S): Chronic | ICD-10-CM

## 2023-01-26 DIAGNOSIS — K55.1 CHRONIC VASCULAR DISORDERS OF INTESTINE: ICD-10-CM

## 2023-01-26 DIAGNOSIS — I10 ESSENTIAL (PRIMARY) HYPERTENSION: ICD-10-CM

## 2023-01-26 DIAGNOSIS — Z92.21 PERSONAL HISTORY OF ANTINEOPLASTIC CHEMOTHERAPY: Chronic | ICD-10-CM

## 2023-01-26 LAB
ALBUMIN SERPL ELPH-MCNC: 4.4 G/DL — SIGNIFICANT CHANGE UP (ref 3.3–5)
ALP SERPL-CCNC: 69 U/L — SIGNIFICANT CHANGE UP (ref 40–120)
ALT FLD-CCNC: 25 U/L — SIGNIFICANT CHANGE UP (ref 4–41)
ANION GAP SERPL CALC-SCNC: 10 MMOL/L — SIGNIFICANT CHANGE UP (ref 7–14)
AST SERPL-CCNC: 30 U/L — SIGNIFICANT CHANGE UP (ref 4–40)
BILIRUB SERPL-MCNC: 0.5 MG/DL — SIGNIFICANT CHANGE UP (ref 0.2–1.2)
BUN SERPL-MCNC: 21 MG/DL — SIGNIFICANT CHANGE UP (ref 7–23)
CALCIUM SERPL-MCNC: 9.2 MG/DL — SIGNIFICANT CHANGE UP (ref 8.4–10.5)
CHLORIDE SERPL-SCNC: 96 MMOL/L — LOW (ref 98–107)
CO2 SERPL-SCNC: 25 MMOL/L — SIGNIFICANT CHANGE UP (ref 22–31)
CREAT SERPL-MCNC: 0.91 MG/DL — SIGNIFICANT CHANGE UP (ref 0.5–1.3)
EGFR: 94 ML/MIN/1.73M2 — SIGNIFICANT CHANGE UP
GLUCOSE SERPL-MCNC: 77 MG/DL — SIGNIFICANT CHANGE UP (ref 70–99)
HCT VFR BLD CALC: 42.8 % — SIGNIFICANT CHANGE UP (ref 39–50)
HGB BLD-MCNC: 14.9 G/DL — SIGNIFICANT CHANGE UP (ref 13–17)
MCHC RBC-ENTMCNC: 32.7 PG — SIGNIFICANT CHANGE UP (ref 27–34)
MCHC RBC-ENTMCNC: 34.8 GM/DL — SIGNIFICANT CHANGE UP (ref 32–36)
MCV RBC AUTO: 93.9 FL — SIGNIFICANT CHANGE UP (ref 80–100)
NRBC # BLD: 0 /100 WBCS — SIGNIFICANT CHANGE UP (ref 0–0)
NRBC # FLD: 0 K/UL — SIGNIFICANT CHANGE UP (ref 0–0)
PLATELET # BLD AUTO: 258 K/UL — SIGNIFICANT CHANGE UP (ref 150–400)
POTASSIUM SERPL-MCNC: 4.1 MMOL/L — SIGNIFICANT CHANGE UP (ref 3.5–5.3)
POTASSIUM SERPL-SCNC: 4.1 MMOL/L — SIGNIFICANT CHANGE UP (ref 3.5–5.3)
PROT SERPL-MCNC: 6.9 G/DL — SIGNIFICANT CHANGE UP (ref 6–8.3)
RBC # BLD: 4.56 M/UL — SIGNIFICANT CHANGE UP (ref 4.2–5.8)
RBC # FLD: 11.7 % — SIGNIFICANT CHANGE UP (ref 10.3–14.5)
SODIUM SERPL-SCNC: 131 MMOL/L — LOW (ref 135–145)
WBC # BLD: 8.16 K/UL — SIGNIFICANT CHANGE UP (ref 3.8–10.5)
WBC # FLD AUTO: 8.16 K/UL — SIGNIFICANT CHANGE UP (ref 3.8–10.5)

## 2023-01-26 PROCEDURE — 93010 ELECTROCARDIOGRAM REPORT: CPT

## 2023-01-26 RX ORDER — SIMVASTATIN 20 MG/1
1 TABLET, FILM COATED ORAL
Qty: 0 | Refills: 0 | DISCHARGE

## 2023-01-26 NOTE — H&P PST ADULT - PROBLEM SELECTOR PLAN 1
Patient tentatively scheduled for direct laryngoscopy with biopsy with frozen section, esophagoscopy on 2/1/23  Pre-op instructions provided. Pt given verbal and written instructions with teach back on pepcid. Pt verbalized understanding with return demonstration.   Preop Covid PCR test ordered .Instructions regarding covid PCR test to be obtained 3- 5 days prior to surgery and locations for covid testing site provided. Pt verbalized understanding Patient tentatively scheduled for direct laryngoscopy with biopsy with frozen section, esophagoscopy on 2/1/23  Pre-op instructions provided. Pt given verbal and written instructions with teach back on pepcid. Pt verbalized understanding with return demonstration.   Preop Covid PCR test ordered .Instructions regarding covid PCR test to be obtained 3- 5 days prior to surgery and locations for covid testing site provided. Pt verbalized understanding.  Last PFT results in chart. Patient tentatively scheduled for direct laryngoscopy with biopsy with frozen section, esophagoscopy on 2/1/23  Pre-op instructions provided. Pt given verbal and written instructions with teach back on pepcid. Pt verbalized understanding with return demonstration.   Preop Covid PCR test ordered .Instructions regarding covid PCR test to be obtained 3- 5 days prior to surgery and locations for covid testing site provided. Pt verbalized understanding.  Last PFT results in chart.  DILAN precautions,

## 2023-01-26 NOTE — H&P PST ADULT - I HAVE PERSONALLY SEEN AND EXAMINED THE PATIENT. THERE HAVE NOT BEEN ANY CHANGES IN THE PATIENT'S HISTORY OR EXAM UNLESS COMMENTED BELOW
rn spoke to granddaughter, she is aware referrals have been submitted on 2/4 for wound care,home health nurse and physician    She is saying that she would like a referral for wound care at [facility] in [place] as they would be able to come pu pt even though she is bedbound and bring her into office to be seen, rn will notify  Provider and see what she thinks but wound care for home as been ordered   Statement Selected

## 2023-01-26 NOTE — H&P PST ADULT - HISTORY OF PRESENT ILLNESS
67 y/o male with HLD and h/o laryngeal CA in 2018 s/p chemo and radiation therapy presents to PST preop for single port robotic assisted radical prostatectomy pelvic lymph node dissection. pt with h/o elevated PSA. s/p prostate biopsy November 2020- positive for malignancy.          67 year old male with PMH of HTN, HLD,  presents to Presurgical testing with diagnosis of  malignant neoplasm of larynx Unspecified scheduled for direct laryngoscopy with biopsy with frozen section, esophagoscopy.

## 2023-01-26 NOTE — H&P PST ADULT - NSICDXPASTSURGICALHX_GEN_ALL_CORE_FT
PAST SURGICAL HISTORY:  H/O colonoscopy 2017    H/O prostate biopsy     History of chemotherapy     S/P radiation therapy      PAST SURGICAL HISTORY:  H/O colonoscopy 2017    H/O prostate biopsy     H/O prostatectomy     History of chemotherapy     S/P radiation therapy

## 2023-01-26 NOTE — H&P PST ADULT - OTHER CARE PROVIDERS
Dr. Kristina Rowland (Vascular) [phone]                                                               Dr. Derick Gar  (Cardio thoracic) [phone] Dr. Kristina Rowland (Vascular) [phone]    Last visit 2/2022                                                      Dr. Derick Gar  (Cardio thoracic) [phone] Dr. Kristina Rowland (Vascular) [phone]                                                      Dr. Derick Gar  (Cardio thoracic) [phone]

## 2023-01-26 NOTE — H&P PST ADULT - PROBLEM SELECTOR PLAN 2
Patient instructed to take losartan with a sip of water on the morning of procedure. Patient verbalized understanding.

## 2023-01-26 NOTE — H&P PST ADULT - NEGATIVE ENMT SYMPTOMS
no hearing difficulty/no tinnitus/no throat pain/no dysphagia

## 2023-01-26 NOTE — H&P PST ADULT - MOUTH
normal mouth and gums/moist preop dx: malignant neoplasm of larynx Unspecified/normal mouth and gums/moist

## 2023-01-31 ENCOUNTER — APPOINTMENT (OUTPATIENT)
Dept: OTOLARYNGOLOGY | Facility: CLINIC | Age: 68
End: 2023-01-31

## 2023-01-31 ENCOUNTER — TRANSCRIPTION ENCOUNTER (OUTPATIENT)
Age: 68
End: 2023-01-31

## 2023-01-31 NOTE — ASU PATIENT PROFILE, ADULT - NSICDXPASTSURGICALHX_GEN_ALL_CORE_FT
PAST SURGICAL HISTORY:  H/O colonoscopy 2017    H/O prostate biopsy     H/O prostatectomy     History of chemotherapy     S/P radiation therapy

## 2023-02-01 ENCOUNTER — RESULT REVIEW (OUTPATIENT)
Age: 68
End: 2023-02-01

## 2023-02-01 ENCOUNTER — OUTPATIENT (OUTPATIENT)
Dept: OUTPATIENT SERVICES | Facility: HOSPITAL | Age: 68
LOS: 1 days | Discharge: HOME | End: 2023-02-01
Payer: MEDICARE

## 2023-02-01 ENCOUNTER — APPOINTMENT (OUTPATIENT)
Dept: OTOLARYNGOLOGY | Facility: HOSPITAL | Age: 68
End: 2023-02-01

## 2023-02-01 ENCOUNTER — TRANSCRIPTION ENCOUNTER (OUTPATIENT)
Age: 68
End: 2023-02-01

## 2023-02-01 VITALS
WEIGHT: 175.93 LBS | HEART RATE: 90 BPM | TEMPERATURE: 98 F | HEIGHT: 71.65 IN | SYSTOLIC BLOOD PRESSURE: 122 MMHG | DIASTOLIC BLOOD PRESSURE: 84 MMHG | OXYGEN SATURATION: 96 % | RESPIRATION RATE: 16 BRPM

## 2023-02-01 VITALS
OXYGEN SATURATION: 94 % | TEMPERATURE: 98 F | HEART RATE: 78 BPM | RESPIRATION RATE: 18 BRPM | SYSTOLIC BLOOD PRESSURE: 116 MMHG | DIASTOLIC BLOOD PRESSURE: 71 MMHG

## 2023-02-01 DIAGNOSIS — Z92.3 PERSONAL HISTORY OF IRRADIATION: Chronic | ICD-10-CM

## 2023-02-01 DIAGNOSIS — Z90.79 ACQUIRED ABSENCE OF OTHER GENITAL ORGAN(S): Chronic | ICD-10-CM

## 2023-02-01 DIAGNOSIS — Z92.21 PERSONAL HISTORY OF ANTINEOPLASTIC CHEMOTHERAPY: Chronic | ICD-10-CM

## 2023-02-01 DIAGNOSIS — Z98.890 OTHER SPECIFIED POSTPROCEDURAL STATES: Chronic | ICD-10-CM

## 2023-02-01 DIAGNOSIS — C32.9 MALIGNANT NEOPLASM OF LARYNX, UNSPECIFIED: ICD-10-CM

## 2023-02-01 PROCEDURE — 43191 ESOPHAGOSCOPY RIGID TRNSO DX: CPT | Mod: GC

## 2023-02-01 PROCEDURE — 31535 LARYNGOSCOPY W/BIOPSY: CPT | Mod: GC

## 2023-02-01 PROCEDURE — 88305 TISSUE EXAM BY PATHOLOGIST: CPT | Mod: 26

## 2023-02-01 RX ORDER — ASPIRIN/CALCIUM CARB/MAGNESIUM 324 MG
1 TABLET ORAL
Qty: 0 | Refills: 0 | DISCHARGE

## 2023-02-01 RX ORDER — LOSARTAN POTASSIUM 100 MG/1
1 TABLET, FILM COATED ORAL
Qty: 0 | Refills: 0 | DISCHARGE

## 2023-02-01 RX ORDER — SIMVASTATIN 20 MG/1
1 TABLET, FILM COATED ORAL
Qty: 0 | Refills: 0 | DISCHARGE

## 2023-02-01 RX ORDER — OXYCODONE HYDROCHLORIDE 5 MG/1
5 TABLET ORAL EVERY 4 HOURS
Refills: 0 | Status: DISCONTINUED | OUTPATIENT
Start: 2023-02-01 | End: 2023-02-01

## 2023-02-01 RX ORDER — HYDROMORPHONE HYDROCHLORIDE 2 MG/ML
1 INJECTION INTRAMUSCULAR; INTRAVENOUS; SUBCUTANEOUS
Refills: 0 | Status: DISCONTINUED | OUTPATIENT
Start: 2023-02-01 | End: 2023-02-01

## 2023-02-01 RX ORDER — OXYCODONE HYDROCHLORIDE 5 MG/1
10 TABLET ORAL EVERY 4 HOURS
Refills: 0 | Status: DISCONTINUED | OUTPATIENT
Start: 2023-02-01 | End: 2023-02-01

## 2023-02-01 RX ORDER — ACETAMINOPHEN 500 MG
650 TABLET ORAL EVERY 6 HOURS
Refills: 0 | Status: DISCONTINUED | OUTPATIENT
Start: 2023-02-01 | End: 2023-02-15

## 2023-02-01 RX ORDER — ONDANSETRON 8 MG/1
4 TABLET, FILM COATED ORAL ONCE
Refills: 0 | Status: DISCONTINUED | OUTPATIENT
Start: 2023-02-01 | End: 2023-02-15

## 2023-02-01 RX ORDER — HYDROMORPHONE HYDROCHLORIDE 2 MG/ML
0.5 INJECTION INTRAMUSCULAR; INTRAVENOUS; SUBCUTANEOUS
Refills: 0 | Status: DISCONTINUED | OUTPATIENT
Start: 2023-02-01 | End: 2023-02-01

## 2023-02-01 NOTE — ASU DISCHARGE PLAN (ADULT/PEDIATRIC) - ASU DC SPECIAL INSTRUCTIONSFT
Activity: No heavy lifting or strenuous activity. Walk as tolerated. Physical therapy per routine.   Diet: As tolerated  Pain: Take over the counter pain medications as needed.  Please apply bacitracin daily as needed to your lip for 3 days.  Please follow up with Dr. Mandel. You can contact his office at the number below.

## 2023-02-01 NOTE — ASU DISCHARGE PLAN (ADULT/PEDIATRIC) - CARE PROVIDER_API CALL
Regis Mandel)  Otolaryngology  [address] [place] [address]  Phone: [phone]  Fax: [phone]  Established Patient  Follow Up Time:

## 2023-02-01 NOTE — ASU DISCHARGE PLAN (ADULT/PEDIATRIC) - NURSING INSTRUCTIONS
DO NOT take any Tylenol (Acetaminophen) or narcotics containing Tylenol until after 4:25pm . You received Tylenol during your operation and it can cause damage to your liver if too much is taken within a 24 hour time period.

## 2023-02-01 NOTE — ASU DISCHARGE PLAN (ADULT/PEDIATRIC) - FOLLOW UP APPOINTMENTS
[facility] After 8 pm PACU /[facility]

## 2023-02-01 NOTE — ASU DISCHARGE PLAN (ADULT/PEDIATRIC) - CALL YOUR DOCTOR IF YOU HAVE ANY OF THE FOLLOWING:
Bleeding that does not stop/Nausea and vomiting that does not stop/Inability to tolerate liquids or foods Bleeding that does not stop/Pain not relieved by Medications/Nausea and vomiting that does not stop/Inability to tolerate liquids or foods

## 2023-02-07 ENCOUNTER — APPOINTMENT (OUTPATIENT)
Dept: OTOLARYNGOLOGY | Facility: CLINIC | Age: 68
End: 2023-02-07
Payer: COMMERCIAL

## 2023-02-07 VITALS
SYSTOLIC BLOOD PRESSURE: 161 MMHG | WEIGHT: 170 LBS | BODY MASS INDEX: 23.03 KG/M2 | DIASTOLIC BLOOD PRESSURE: 97 MMHG | HEIGHT: 72 IN | HEART RATE: 77 BPM

## 2023-02-07 LAB — SURGICAL PATHOLOGY STUDY: SIGNIFICANT CHANGE UP

## 2023-02-07 PROCEDURE — 31575 DIAGNOSTIC LARYNGOSCOPY: CPT

## 2023-02-07 PROCEDURE — 99214 OFFICE O/P EST MOD 30 MIN: CPT | Mod: 25

## 2023-02-07 RX ORDER — ASPIRIN 81 MG/1
81 TABLET ORAL DAILY
Qty: 90 | Refills: 1 | Status: COMPLETED | COMMUNITY
Start: 2020-07-23 | End: 2023-02-07

## 2023-02-07 NOTE — PROCEDURE
[Hoarseness] : hoarseness not clearly evaluated by indirect laryngoscopy [None] : none [Flexible Endoscope] : examined with the flexible endoscope [Serial Number: ___] : Serial Number: [unfilled] [de-identified] : No lesions in the NPx, OPx, HPx or larynx.  Expected posttreatment changes, VC are mobile, airway patent.  Areas of recent biopsy healing well.  \par  [de-identified] : larynx SCCa

## 2023-02-07 NOTE — PHYSICAL EXAM
[Midline] : trachea is located in midline position [Laryngoscopy Performed] : laryngoscopy was performed, see procedure section for findings [Normal] : no rashes [de-identified] : Posttreatment changes, no LAD. [FreeTextEntry1] : No concerning lesions in the OC/OPx on inspection or palpation.\par

## 2023-02-07 NOTE — HISTORY OF PRESENT ILLNESS
[de-identified] : 67 year old male presents for follow up for larynx cancer.  Chemo/ RT completed 4/2018. \par last ct of neck  Mild irregularity and nodularity of the right AE fold and chest on 1/5/2023 stable. pt had the bx on 2/1/2023 Laryngeal mucosa with squamous hyperplasia, hyperparakeratosis, and low grade dysplasia.\par pt is here for f/u post DL and Bx  \par Patient denies dysphagia, odynophagia, dyspnea, dysphonia or otalgia, no coughing up blood. \par Denies use of over the counter antacids or reflux medications. \par \par \par Path report Final Diagnosis 02/01/23 \par 1. Larynx, right posterior vocal fold mass, biopsy\par - Laryngeal mucosa with squamous hyperplasia, hyperparakeratosis, and\par low grade dysplasia\par 2. Larynx, right AE fold, biopsy\par - Laryngeal mucosa with squamous hyperplasia and mild chronic\par inflammation

## 2023-02-14 ENCOUNTER — APPOINTMENT (OUTPATIENT)
Dept: OTOLARYNGOLOGY | Facility: CLINIC | Age: 68
End: 2023-02-14

## 2023-02-16 ENCOUNTER — APPOINTMENT (OUTPATIENT)
Dept: OTOLARYNGOLOGY | Facility: CLINIC | Age: 68
End: 2023-02-16
Payer: COMMERCIAL

## 2023-02-16 PROCEDURE — 92520 LARYNGEAL FUNCTION STUDIES: CPT | Mod: GN

## 2023-02-16 PROCEDURE — 92524 BEHAVRAL QUALIT ANALYS VOICE: CPT | Mod: GN

## 2023-02-21 ENCOUNTER — APPOINTMENT (OUTPATIENT)
Dept: OTOLARYNGOLOGY | Facility: CLINIC | Age: 68
End: 2023-02-21
Payer: COMMERCIAL

## 2023-02-21 PROCEDURE — 92507 TX SP LANG VOICE COMM INDIV: CPT | Mod: GN

## 2023-02-27 ENCOUNTER — APPOINTMENT (OUTPATIENT)
Dept: OTOLARYNGOLOGY | Facility: CLINIC | Age: 68
End: 2023-02-27
Payer: COMMERCIAL

## 2023-02-27 PROCEDURE — 92507 TX SP LANG VOICE COMM INDIV: CPT | Mod: GN

## 2023-03-06 ENCOUNTER — APPOINTMENT (OUTPATIENT)
Dept: OTOLARYNGOLOGY | Facility: CLINIC | Age: 68
End: 2023-03-06
Payer: COMMERCIAL

## 2023-03-06 PROCEDURE — 92507 TX SP LANG VOICE COMM INDIV: CPT | Mod: GN

## 2023-03-13 ENCOUNTER — APPOINTMENT (OUTPATIENT)
Dept: OTOLARYNGOLOGY | Facility: CLINIC | Age: 68
End: 2023-03-13
Payer: COMMERCIAL

## 2023-03-13 PROCEDURE — 92507 TX SP LANG VOICE COMM INDIV: CPT | Mod: GN

## 2023-03-15 ENCOUNTER — RX RENEWAL (OUTPATIENT)
Age: 68
End: 2023-03-15

## 2023-03-28 ENCOUNTER — RX RENEWAL (OUTPATIENT)
Age: 68
End: 2023-03-28

## 2023-03-28 ENCOUNTER — APPOINTMENT (OUTPATIENT)
Dept: OTOLARYNGOLOGY | Facility: CLINIC | Age: 68
End: 2023-03-28
Payer: COMMERCIAL

## 2023-03-28 VITALS — HEART RATE: 80 BPM | SYSTOLIC BLOOD PRESSURE: 157 MMHG | DIASTOLIC BLOOD PRESSURE: 79 MMHG

## 2023-03-28 VITALS — WEIGHT: 170 LBS | BODY MASS INDEX: 23.06 KG/M2

## 2023-03-28 PROCEDURE — 99214 OFFICE O/P EST MOD 30 MIN: CPT | Mod: 25

## 2023-03-28 PROCEDURE — 31575 DIAGNOSTIC LARYNGOSCOPY: CPT

## 2023-03-28 NOTE — HISTORY OF PRESENT ILLNESS
[de-identified] : 67 year old male presents for follow up for larynx cancer.  Chemo/ RT completed 4/2018. \par last ct of neck  Mild irregularity and nodularity of the right AE fold and chest on 1/5/2023 stable. pt had the bx on 2/1/2023 Laryngeal mucosa with squamous hyperplasia, hyperparakeratosis, and low grade dysplasia.\par \par pt is here for 6 weeks f/u, and completed SLP for vocal hygiene (had 5 apts)\par States voice seems to have improved. \par Denies neck swelling or pain. \par Patient denies dysphagia, odynophagia, dyspnea, dysphonia or otalgia, no coughing up blood. \par Denies use of over the counter antacids or reflux medications. \par

## 2023-03-28 NOTE — PHYSICAL EXAM
[Midline] : trachea is located in midline position [Laryngoscopy Performed] : laryngoscopy was performed, see procedure section for findings [Normal] : no rashes [de-identified] : Posttreatment changes, no LAD. [FreeTextEntry1] : No concerning lesions in the OC/OPx on inspection or palpation.\par

## 2023-03-28 NOTE — PROCEDURE
[Hoarseness] : hoarseness not clearly evaluated by indirect laryngoscopy [None] : none [Flexible Endoscope] : examined with the flexible endoscope [Serial Number: ___] : Serial Number: [unfilled] [de-identified] : No lesions in the NPx, OPx, HPx or larynx.  Expected posttreatment changes, VC are mobile, airway patent.\par

## 2023-03-29 ENCOUNTER — APPOINTMENT (OUTPATIENT)
Dept: OTOLARYNGOLOGY | Facility: CLINIC | Age: 68
End: 2023-03-29
Payer: COMMERCIAL

## 2023-03-29 PROCEDURE — 92507 TX SP LANG VOICE COMM INDIV: CPT | Mod: GN

## 2023-04-13 ENCOUNTER — OUTPATIENT (OUTPATIENT)
Dept: OUTPATIENT SERVICES | Facility: HOSPITAL | Age: 68
LOS: 1 days | End: 2023-04-13
Payer: COMMERCIAL

## 2023-04-13 ENCOUNTER — APPOINTMENT (OUTPATIENT)
Dept: CT IMAGING | Facility: IMAGING CENTER | Age: 68
End: 2023-04-13
Payer: COMMERCIAL

## 2023-04-13 DIAGNOSIS — Z98.890 OTHER SPECIFIED POSTPROCEDURAL STATES: Chronic | ICD-10-CM

## 2023-04-13 DIAGNOSIS — C32.9 MALIGNANT NEOPLASM OF LARYNX, UNSPECIFIED: ICD-10-CM

## 2023-04-13 DIAGNOSIS — Z92.3 PERSONAL HISTORY OF IRRADIATION: Chronic | ICD-10-CM

## 2023-04-13 PROCEDURE — 70491 CT SOFT TISSUE NECK W/DYE: CPT | Mod: 26,MH

## 2023-04-13 PROCEDURE — 70491 CT SOFT TISSUE NECK W/DYE: CPT

## 2023-04-13 PROCEDURE — 71260 CT THORAX DX C+: CPT

## 2023-04-13 PROCEDURE — 71260 CT THORAX DX C+: CPT | Mod: 26,MH

## 2023-04-19 ENCOUNTER — APPOINTMENT (OUTPATIENT)
Dept: OTOLARYNGOLOGY | Facility: CLINIC | Age: 68
End: 2023-04-19
Payer: COMMERCIAL

## 2023-04-19 PROCEDURE — 92507 TX SP LANG VOICE COMM INDIV: CPT | Mod: GN

## 2023-04-26 ENCOUNTER — APPOINTMENT (OUTPATIENT)
Dept: OTOLARYNGOLOGY | Facility: CLINIC | Age: 68
End: 2023-04-26
Payer: COMMERCIAL

## 2023-04-26 PROCEDURE — 92507 TX SP LANG VOICE COMM INDIV: CPT | Mod: GN

## 2023-05-03 ENCOUNTER — APPOINTMENT (OUTPATIENT)
Dept: VASCULAR SURGERY | Facility: CLINIC | Age: 68
End: 2023-05-03
Payer: COMMERCIAL

## 2023-05-03 VITALS
WEIGHT: 175 LBS | DIASTOLIC BLOOD PRESSURE: 90 MMHG | HEART RATE: 78 BPM | TEMPERATURE: 98.1 F | OXYGEN SATURATION: 97 % | BODY MASS INDEX: 23.7 KG/M2 | SYSTOLIC BLOOD PRESSURE: 150 MMHG | HEIGHT: 72 IN

## 2023-05-03 PROCEDURE — 99213 OFFICE O/P EST LOW 20 MIN: CPT

## 2023-05-03 NOTE — PHYSICAL EXAM
[Normal Breath Sounds] : Normal breath sounds [Respiratory Effort] : normal respiratory effort [Normal Heart Sounds] : normal heart sounds [Normal Rate and Rhythm] : normal rate and rhythm [Femoral Arteries] : Normal femoral pulses [0] : left 0 [No Rash or Lesion] : No rash or lesion [Calm] : calm [JVD] : no jugular venous distention  [Carotid Bruits] : no carotid bruits [Right Carotid Bruit] : no bruit heard over the right carotid [Left Carotid Bruit] : no bruit heard over the left carotid [2+] : right 2+ [Ankle Swelling (On Exam)] : not present [Varicose Veins Of Lower Extremities] : bilaterally [Ankle Swelling On The Right] : mild [de-identified] : Well-appearing  [de-identified] : EOMI, anicteric, CN II-XII grossly intact [de-identified] : Supple [de-identified] : soft, nt, nd [de-identified] : motor and sensation intact in all four extremities  [de-identified] : A&Ox4

## 2023-05-03 NOTE — HISTORY OF PRESENT ILLNESS
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 No diarrhea or constipation. No history of food fear or recent weight loss. He states that he lost weight when he underwent chemotherapy but since then has regained that weight back. \par He is a former smoker (>1ppd for 35 years) but still smokes 4 ecigarettes per day.\par He has stopped alcohol use at the time of his cancer diagnosis. He does not use any other substances. \par \par 1/28/21 pt presents today for follow up regarding SMA stenosis. He remains asymptomatic and denies any new complaints or changes. No recent weight loss or abdominal pain. He is still smoking e-cigarettes. He has not been taking ASA 81mg. \par \par 7/29/2021 - Pt presents for follow up. Patient is scheduled for prostate surgery on August 10, 2021. Here for vascular evaluation prior to surgery. \par Denies lower extremity symptoms - no symptoms of claudication or pain. Reports having been active all summer without issues. \par In regards to SMA stenosis, remains asymptomatic. No postprandial pain. No nausea or vomiting, no changes to bowel habits. \par In regards to carotid stenosis, remains asymptomatic. No episodes of unilateral numbness or weakness, no motor or sensory loss. No amaurosis. Denies symptoms of stroke or TIA. \par \par 2/1/2022-Pt presents for follow up. Since last visit, patient underwent prostate surgery and has recovered. Denies nausea or vomiting. No post-prandial pain. Eating well without issues. No recent weight loss. No episodes of unilateral or weakness, no motor or sensory loss. No amaurosis fugax. No slurred speech. No stroke or TIA symptoms. Continues to smoke e-cigarettes intermittently.  [de-identified] : 5/3/2023 - Pt presents for follow up visit. Since last visit, patient reports voice hoarseness for which he has been evaluated by Dr. Mandel from ENT. Follows w/ speech pathology. No abdominal pain with eating. No nausea or vomiting. No changes to bowel habits. No episodes of unilateral numbness or weakness. No amaurosis. No lower extremity pain or symptoms of claudication. Patient has stopped taking aspirin 81 mg.

## 2023-05-03 NOTE — ASSESSMENT
Provider E-Visit time total (minutes): 8     [FreeTextEntry1] : 68 year old man with history of squamous ca of glottis s/p chemoradiation, here for evaluation of asymptomatic SMA stenosis. \par \par 1. Asymptomatic SMA stenosis\par - No urgent surgical intervention is needed at this time as patient is asymptomatic.\par - Patient educated on symptoms of mesenteric ischemia.\par - Recommend medical management and optimization. Continue to take aspirin and simvastatin. \par - Will obtain SMA duplex for surveillance. \par \par 2. Mild carotid stenosis\par - Repeat duplex with < 50% stenosis bilaterally. \par - Will repeat carotid duplex. \par \par 3. PAD\par - Asymptomatic\par - Will obtain DANA/PVRs for evaluation.\par - Medical management of PAD with aspirin 81 mg daily, simvastatin. \par \par 4. Venous insufficiency, CEAP C2\par - Asymptomatic. Denies pain or LE symptoms. \par - No intervention needed.

## 2023-05-10 ENCOUNTER — APPOINTMENT (OUTPATIENT)
Dept: OTOLARYNGOLOGY | Facility: CLINIC | Age: 68
End: 2023-05-10
Payer: COMMERCIAL

## 2023-05-10 PROCEDURE — 92507 TX SP LANG VOICE COMM INDIV: CPT | Mod: GN

## 2023-05-22 ENCOUNTER — APPOINTMENT (OUTPATIENT)
Dept: VASCULAR SURGERY | Facility: CLINIC | Age: 68
End: 2023-05-22
Payer: COMMERCIAL

## 2023-05-22 PROCEDURE — 93975 VASCULAR STUDY: CPT

## 2023-05-22 PROCEDURE — 93923 UPR/LXTR ART STDY 3+ LVLS: CPT

## 2023-05-23 ENCOUNTER — APPOINTMENT (OUTPATIENT)
Dept: VASCULAR SURGERY | Facility: CLINIC | Age: 68
End: 2023-05-23
Payer: MEDICARE

## 2023-05-23 PROCEDURE — 93880 EXTRACRANIAL BILAT STUDY: CPT

## 2023-05-24 ENCOUNTER — NON-APPOINTMENT (OUTPATIENT)
Age: 68
End: 2023-05-24

## 2023-05-24 ENCOUNTER — APPOINTMENT (OUTPATIENT)
Dept: OTOLARYNGOLOGY | Facility: CLINIC | Age: 68
End: 2023-05-24
Payer: MEDICARE

## 2023-05-24 PROCEDURE — 92507 TX SP LANG VOICE COMM INDIV: CPT | Mod: GN

## 2023-05-27 LAB
APPEARANCE: CLEAR
BACTERIA UR CULT: NORMAL
BACTERIA: NEGATIVE /HPF
BILIRUBIN URINE: NEGATIVE
BLOOD URINE: NEGATIVE
CAST: 0 /LPF
COLOR: YELLOW
EPITHELIAL CELLS: 0 /HPF
GLUCOSE QUALITATIVE U: NEGATIVE MG/DL
KETONES URINE: NEGATIVE MG/DL
LEUKOCYTE ESTERASE URINE: NEGATIVE
MICROSCOPIC-UA: NORMAL
NITRITE URINE: NEGATIVE
PH URINE: 7
PROTEIN URINE: NEGATIVE MG/DL
RED BLOOD CELLS URINE: 1 /HPF
SPECIFIC GRAVITY URINE: 1.01
UROBILINOGEN URINE: 0.2 MG/DL
WHITE BLOOD CELLS URINE: 0 /HPF

## 2023-05-30 ENCOUNTER — APPOINTMENT (OUTPATIENT)
Dept: OTOLARYNGOLOGY | Facility: CLINIC | Age: 68
End: 2023-05-30
Payer: COMMERCIAL

## 2023-05-30 VITALS
HEIGHT: 72 IN | HEART RATE: 73 BPM | DIASTOLIC BLOOD PRESSURE: 85 MMHG | BODY MASS INDEX: 23.03 KG/M2 | WEIGHT: 170 LBS | SYSTOLIC BLOOD PRESSURE: 175 MMHG | OXYGEN SATURATION: 99 %

## 2023-05-30 PROCEDURE — 99214 OFFICE O/P EST MOD 30 MIN: CPT | Mod: 25

## 2023-05-30 PROCEDURE — 31575 DIAGNOSTIC LARYNGOSCOPY: CPT

## 2023-05-30 NOTE — PROCEDURE
[Hoarseness] : hoarseness not clearly evaluated by indirect laryngoscopy [None] : none [Flexible Endoscope] : examined with the flexible endoscope [Serial Number: ___] : Serial Number: [unfilled] [de-identified] : No lesions in the NPx, OPx, HPx or larynx.  Expected posttreatment changes, VC are mobile, airway patent.\par

## 2023-05-30 NOTE — HISTORY OF PRESENT ILLNESS
[de-identified] : 68 year old male with history of laryngeal cancer.  Chemo/ RT completed 4/2018. Last CT of neck showed Mild irregularity and nodularity of the right AE fold and chest on 1/5/2023 stable. Biopsy on 2/1/2023 Laryngeal mucosa with squamous hyperplasia, hyperparakeratosis, and low grade dysplasia. Presents today for follow up evaluation. Completed SLP for vocal hygiene - still with intermittent hoarseness and dry mouth. Eating and drinking without difficulty. Patient denies dysphagia, odynophagia, dyspnea, dysphonia or hemoptysis.

## 2023-05-30 NOTE — PHYSICAL EXAM
[Midline] : trachea is located in midline position [Laryngoscopy Performed] : laryngoscopy was performed, see procedure section for findings [Normal] : no rashes [de-identified] : Posttreatment changes, no LAD. [FreeTextEntry1] : No concerning lesions in the OC/OPx on inspection or palpation.\par

## 2023-06-25 ENCOUNTER — RX RENEWAL (OUTPATIENT)
Age: 68
End: 2023-06-25

## 2023-06-25 RX ORDER — SIMVASTATIN 20 MG/1
20 TABLET, FILM COATED ORAL
Qty: 90 | Refills: 0 | Status: ACTIVE | COMMUNITY
Start: 2021-02-16 | End: 1900-01-01

## 2023-06-29 ENCOUNTER — APPOINTMENT (OUTPATIENT)
Dept: UROLOGY | Facility: CLINIC | Age: 68
End: 2023-06-29
Payer: COMMERCIAL

## 2023-06-29 DIAGNOSIS — N39.0 URINARY TRACT INFECTION, SITE NOT SPECIFIED: ICD-10-CM

## 2023-06-29 PROCEDURE — 99214 OFFICE O/P EST MOD 30 MIN: CPT

## 2023-06-29 NOTE — LETTER BODY
[FreeTextEntry1] : Aramis Bradford MD\par [address]\par [address]\[place] [address]\par [phone]\par \par Dear Dr. Bradford,\par \par Reason for visit: Prostate cancer status post radical prostatectomy.. \par \par This is a 67 year-old gentleman with prostate cancer. His prostate biopsy in Nov 2020 demonstrated [place] 6/7 adenocarcinoma of the prostate involving 3 of 12 cores.The patient returns today for follow-up. Since his last visit, patient underwent 3 years ago. Patient's PSA is undetectable. Patient is continent and does not wear pads. He is able to have spontaneous partial erections.  He is able to penetrate with help of sildenafil.  Patient does report occasional urinary leakage with sexual activity.   Patient reports some dysuria. He denies any pain or hematuria or urinary incontinence. Patient denies any changes in health. The past medical history and family history and social history are unchanged. All other review of systems are negative. Patient denies any changes in medications. Medication list was reconciled.\par \par On examination, the patient is a healthy-appearing gentleman in no acute distress. He is alert and oriented follows commands. He has normal mood and affect. He is normocephalic. Neck is supple. Oral no thrush Respirations are unlabored. His abdomen is soft and nontender. Bladder is nonpalpable. No CVA tenderness. Neurologically he is grossly intact. No peripheral edema. Skin without gross abnormality.  \par \par His PSA 6 in 2022 was less than 0.01.\par \par Assessment: Prostate cancer.\par \par I counseled the patient. in terms of his prostate cancer, patient underwent surgery.  His PSA remains undetectable.  Patient is continent and dry.  He has erections with sildenafil.   He does report occasional urinary leakage with sexual activity.  This is related to the open bladder neck.  I discussed the option of trying imipramine.  Patient declines medical therapy.  The risks and benefits were discussed. Alternatives were given. I answered the patient questions. The patient will repeat BMP and PSA today to ensure stability. I recommended the patient repeat Urinalysis to ensure stability. Risks and alternatives were discussed. I answered the patient questions. The patient will follow-up as directed and will contact me with any questions or concerns. Thank you for the opportunity to participate in the care of Mr. KONG. I will keep you updated on his progress.\par \par Plan: BMP. PSA. Repeat urinalysis. Urine cytology. Urine culture.  Consider imipramine.  Follow-up 1 year.\par

## 2023-06-30 ENCOUNTER — RX RENEWAL (OUTPATIENT)
Age: 68
End: 2023-06-30

## 2023-07-01 LAB
ANION GAP SERPL CALC-SCNC: 11 MMOL/L
APPEARANCE: CLEAR
BACTERIA UR CULT: NORMAL
BACTERIA: NEGATIVE /HPF
BILIRUBIN URINE: NEGATIVE
BLOOD URINE: NEGATIVE
BUN SERPL-MCNC: 18 MG/DL
CALCIUM SERPL-MCNC: 9.2 MG/DL
CAST: 0 /LPF
CHLORIDE SERPL-SCNC: 96 MMOL/L
CO2 SERPL-SCNC: 24 MMOL/L
COLOR: YELLOW
CREAT SERPL-MCNC: 1.04 MG/DL
EGFR: 79 ML/MIN/1.73M2
EPITHELIAL CELLS: 0 /HPF
GLUCOSE QUALITATIVE U: NEGATIVE MG/DL
GLUCOSE SERPL-MCNC: 81 MG/DL
KETONES URINE: NEGATIVE MG/DL
LEUKOCYTE ESTERASE URINE: NEGATIVE
MICROSCOPIC-UA: NORMAL
NITRITE URINE: NEGATIVE
PH URINE: 7
POTASSIUM SERPL-SCNC: 4.7 MMOL/L
PROTEIN URINE: NEGATIVE MG/DL
PSA SERPL-MCNC: <0.01 NG/ML
RED BLOOD CELLS URINE: 0 /HPF
SODIUM SERPL-SCNC: 131 MMOL/L
SPECIFIC GRAVITY URINE: 1.01
UROBILINOGEN URINE: 0.2 MG/DL
WHITE BLOOD CELLS URINE: 0 /HPF

## 2023-08-14 ENCOUNTER — APPOINTMENT (OUTPATIENT)
Dept: GASTROENTEROLOGY | Facility: CLINIC | Age: 68
End: 2023-08-14
Payer: COMMERCIAL

## 2023-08-14 VITALS
HEART RATE: 90 BPM | SYSTOLIC BLOOD PRESSURE: 187 MMHG | HEIGHT: 72 IN | BODY MASS INDEX: 23.3 KG/M2 | WEIGHT: 172 LBS | OXYGEN SATURATION: 98 % | TEMPERATURE: 98.2 F | DIASTOLIC BLOOD PRESSURE: 113 MMHG

## 2023-08-14 DIAGNOSIS — J44.9 CHRONIC OBSTRUCTIVE PULMONARY DISEASE, UNSPECIFIED: ICD-10-CM

## 2023-08-14 DIAGNOSIS — E87.1 HYPO-OSMOLALITY AND HYPONATREMIA: ICD-10-CM

## 2023-08-14 DIAGNOSIS — C61 MALIGNANT NEOPLASM OF PROSTATE: ICD-10-CM

## 2023-08-14 PROCEDURE — 99214 OFFICE O/P EST MOD 30 MIN: CPT

## 2023-08-14 NOTE — ASSESSMENT
[FreeTextEntry1] : Patient with high-grade dysplasia and a tubular adenoma in the sigmoid colon which was removed about 6 years ago.  Subsequent colonoscopy did not reveal any residual.  He currently is asymptomatic.  He has no upper gastrointestinal symptoms. He does have a history of throat cancer.  He was thought to have a recurrence but biopsy recently was negative and CT and sonogram of the neck area did not reveal any recurrence. Blood work revealed a PSA of 4.01.  He does have a history of prostate cancer treated by robotic surgery. Blood work revealed sodium of 131.  This will be repeated.

## 2023-08-14 NOTE — HISTORY OF PRESENT ILLNESS
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  Course was completed in 4/2018.  He is doing well with no evidence of residual disease.  Patient also has a history of excision of a sigmoid colon polyp with high-grade dysplasia.  His bowel movements are now much better on fiber supplements and Colace.  He denies seeing any blood in the stool.  His weight is stable.  He presents for a surveillance colonoscopy.  Patient was found to have SMA stenosis on surveillance CT.  He does not complain of any postprandial pain.  Patient was diagnosed with prostate cancer and has undergone robotic surgery.  8/14/2023-patient with normal bowel movements.  He has no abdominal pain.  He had his last colonoscopy in 7/19/2021.  This revealed hyperplastic rectal polyp.  He did have 2 small AVMs in the cecum which were nonbleeding. He also has no upper gastrointestinal symptoms.  His weight is stable.  He was treated for prostate cancer and his latest PSA as a is less than 0.01.  He did have blood work that revealed sodium of 131.

## 2023-08-14 NOTE — PHYSICAL EXAM
[Alert] : alert [Normal Voice/Communication] : normal voice/communication [Healthy Appearing] : healthy appearing [No Acute Distress] : no acute distress [Sclera] : the sclera and conjunctiva were normal [Hearing Threshold Finger Rub Not [place]] : hearing was normal [Normal Lips/Gums] : the lips and gums were normal [Oropharynx] : the oropharynx was normal [Normal Appearance] : the appearance of the neck was normal [No Neck Mass] : no neck mass was observed [No Respiratory Distress] : no respiratory distress [No Acc Muscle Use] : no accessory muscle use [Respiration, Rhythm And Depth] : normal respiratory rhythm and effort [Auscultation Breath Sounds / Voice Sounds] : lungs were clear to auscultation bilaterally [Heart Rate And Rhythm] : heart rate was normal and rhythm regular [Normal S1, S2] : normal S1 and S2 [Murmurs] : no murmurs [Bowel Sounds] : normal bowel sounds [Abdomen Tenderness] : non-tender [No Masses] : no abdominal mass palpated [Abdomen Soft] : soft [] : no hepatosplenomegaly [No CVA Tenderness] : no CVA  tenderness [No Spinal Tenderness] : no spinal tenderness [Abnormal Walk] : normal gait [Oriented To Time, Place, And Person] : oriented to person, place, and time

## 2023-08-28 LAB
ANION GAP SERPL CALC-SCNC: 14 MMOL/L
BUN SERPL-MCNC: 22 MG/DL
CALCIUM SERPL-MCNC: 9.5 MG/DL
CHLORIDE SERPL-SCNC: 99 MMOL/L
CO2 SERPL-SCNC: 25 MMOL/L
CREAT SERPL-MCNC: 1.13 MG/DL
EGFR: 72 ML/MIN/1.73M2
GLUCOSE SERPL-MCNC: 124 MG/DL
POTASSIUM SERPL-SCNC: 4.4 MMOL/L
SODIUM SERPL-SCNC: 137 MMOL/L

## 2023-09-05 ENCOUNTER — RX RENEWAL (OUTPATIENT)
Age: 68
End: 2023-09-05

## 2023-09-05 ENCOUNTER — APPOINTMENT (OUTPATIENT)
Dept: OTOLARYNGOLOGY | Facility: CLINIC | Age: 68
End: 2023-09-05
Payer: COMMERCIAL

## 2023-09-05 VITALS
SYSTOLIC BLOOD PRESSURE: 150 MMHG | HEIGHT: 72 IN | WEIGHT: 170 LBS | BODY MASS INDEX: 23.03 KG/M2 | DIASTOLIC BLOOD PRESSURE: 104 MMHG | HEART RATE: 86 BPM

## 2023-09-05 PROCEDURE — 31575 DIAGNOSTIC LARYNGOSCOPY: CPT

## 2023-09-05 PROCEDURE — 99213 OFFICE O/P EST LOW 20 MIN: CPT | Mod: 25

## 2023-09-05 NOTE — HISTORY OF PRESENT ILLNESS
[de-identified] : 68 year old male with history of laryngeal cancer.  Chemo/ RT completed 4/2018. Last CT of neck showed Mild irregularity and nodularity of the right AE fold and chest on 1/2023 stable. Biopsy on 2/1/2023 Laryngeal mucosa with squamous hyperplasia, hyperparakeratosis, and low grade dysplasia. Repeat ct of neck and chest 4/2023 stable. Completed SLP for vocal hygiene. Pt is here for 3 months f/u and no .c/o, voice is better, the intermittent hoarseness and dry mouth is secondary to the heat during the winter time.  Patient denies dysphagia, odynophagia, dyspnea, dysphonia or hemoptysis.

## 2023-09-05 NOTE — PROCEDURE
[Hoarseness] : hoarseness not clearly evaluated by indirect laryngoscopy [None] : none [Flexible Endoscope] : examined with the flexible endoscope [Serial Number: ___] : Serial Number: [unfilled] [de-identified] : No lesions in the NPx, OPx, HPx or larynx.  Expected posttreatment changes, VC are mobile, airway patent.\par

## 2023-09-05 NOTE — PHYSICAL EXAM
[Normal] : mucosa is normal [Midline] : trachea is located in midline position [Laryngoscopy Performed] : laryngoscopy was performed, see procedure section for findings [de-identified] : Posttreatment changes, no LAD. [FreeTextEntry1] : No concerning lesions in the OC/OPx on inspection or palpation.\par

## 2023-11-22 ENCOUNTER — NON-APPOINTMENT (OUTPATIENT)
Age: 68
End: 2023-11-22

## 2023-12-20 NOTE — PATIENT PROFILE ADULT - FALL HARM RISK TYPE OF ASSESSMENT
Assessment/Plan   Problem List Items Addressed This Visit       Diverticulosis    Relevant Orders    CBC and Auto Differential    Diverticulosis of colon without diverticulitis    Hypothyroidism, postsurgical    Malignant neoplasm of thyroid gland (CMS/HCC) - Primary    Migraine without status migrainosus, not intractable    HTN (hypertension), benign    Relevant Medications    dilTIAZem ER (Tiazac) 300 mg 24 hr capsule    Hypercholesterolemia    Relevant Orders    Lipid Panel     Other Visit Diagnoses       Encounter for screening mammogram for malignant neoplasm of breast        Relevant Orders    BI mammo bilateral screening tomosynthesis    Well adult health check        Relevant Orders    Comprehensive Metabolic Panel    Allergic rhinitis, unspecified seasonality, unspecified trigger        Relevant Medications    desloratadine (Clarinex) 5 mg tablet        Condition discussed refill provided  Labs ordered  Continue to follow with endocrinologist in relation to the care of thyroid replacement    Subjective   Patient ID: Marcia Krishna is a 59 y.o. female who presents for Med Refill.    Past Surgical History:   Procedure Laterality Date     SECTION, CLASSIC  2014     Section    OTHER SURGICAL HISTORY  2018    Breast Surgery Enlargement Procedure Bilateral    TOTAL ABDOMINAL HYSTERECTOMY W/ BILATERAL SALPINGOOPHORECTOMY  2014    Total Abdominal Hysterectomy With Removal Of Both Ovaries    TOTAL THYROIDECTOMY  2014    Thyroid Surgery Total Thyroidectomy      Family History   Problem Relation Name Age of Onset    Hypertension Mother      Osteoporosis Mother      Stroke Mother      Other (cardiac disorder) Father      Other (coagulation disorder) Father      Hypertension Father        Social History     Socioeconomic History    Marital status:      Spouse name: Not on file    Number of children: Not on file    Years of education: Not on file    Highest education level: Not  "on file   Occupational History    Not on file   Tobacco Use    Smoking status: Never     Passive exposure: Past    Smokeless tobacco: Never   Substance and Sexual Activity    Alcohol use: Yes     Alcohol/week: 1.0 standard drink of alcohol     Types: 1 Standard drinks or equivalent per week    Drug use: Never    Sexual activity: Not on file   Other Topics Concern    Not on file   Social History Narrative    Not on file     Social Determinants of Health     Financial Resource Strain: Not on file   Food Insecurity: Not on file   Transportation Needs: Not on file   Physical Activity: Not on file   Stress: Not on file   Social Connections: Not on file   Intimate Partner Violence: Not on file   Housing Stability: Not on file      Patient has no known allergies.   Current Outpatient Medications   Medication Sig Dispense Refill    levothyroxine (Synthroid, Levoxyl) 100 mcg tablet Take 1 tablet daily 6 days per week      liothyronine (Cytomel) 5 mcg tablet Take 2 tablets (10 mcg) by mouth once daily.      montelukast (Singulair) 10 mg tablet Take 1 tablet (10 mg) by mouth once daily at bedtime.      desloratadine (Clarinex) 5 mg tablet Take 1 tablet (5 mg) by mouth once daily. 90 tablet 1    dilTIAZem ER (Tiazac) 300 mg 24 hr capsule Take 1 capsule (300 mg) by mouth once daily. 90 capsule 1     No current facility-administered medications for this visit.      Vitals:    12/20/23 0828   BP: 133/79   BP Location: Left arm   Patient Position: Sitting   Pulse: 60   Temp: 35.9 °C (96.6 °F)   Weight: 64.7 kg (142 lb 9.6 oz)   Height: 1.626 m (5' 4\")      Problem List Items Addressed This Visit       Diverticulosis    Relevant Orders    CBC and Auto Differential    Diverticulosis of colon without diverticulitis    Hypothyroidism, postsurgical    Malignant neoplasm of thyroid gland (CMS/HCC) - Primary    Migraine without status migrainosus, not intractable    HTN (hypertension), benign    Relevant Medications    dilTIAZem ER " (Tiazac) 300 mg 24 hr capsule    Hypercholesterolemia    Relevant Orders    Lipid Panel     Other Visit Diagnoses       Encounter for screening mammogram for malignant neoplasm of breast        Relevant Orders    BI mammo bilateral screening tomosynthesis    Well adult health check        Relevant Orders    Comprehensive Metabolic Panel    Allergic rhinitis, unspecified seasonality, unspecified trigger        Relevant Medications    desloratadine (Clarinex) 5 mg tablet           Orders Placed This Encounter   Procedures    BI mammo bilateral screening tomosynthesis     Standing Status:   Future     Standing Expiration Date:   2/20/2025     Order Specific Question:   Perform a breast ultrasound if clinically indicated by Radiologist?     Answer:   Yes     Order Specific Question:   Is the patient pregnant?     Answer:   No     Order Specific Question:   Reason for exam:     Answer:   Breast Cancer Screening     Order Specific Question:   Radiologist to Determine Optimal Study     Answer:   Yes     Order Specific Question:   Release result to [employer]     Answer:   Immediate     Order Specific Question:   Is this exam part of a Research Study? If Yes, link this order to the research study     Answer:   No    CBC and Auto Differential     Standing Status:   Future     Standing Expiration Date:   12/20/2024     Order Specific Question:   Release result to [facility]     Answer:   Immediate    Comprehensive Metabolic Panel     Standing Status:   Future     Standing Expiration Date:   12/20/2024     Order Specific Question:   Release result to [facility]     Answer:   Immediate    Lipid Panel     Standing Status:   Future     Standing Expiration Date:   12/20/2024     Order Specific Question:   Release result to [facility]     Answer:   Immediate        HPI  No complaint  Here for refill    ROS getting  Past medical history reviewed  Social and family history reviewed  Allergies and medications reviewed  Recent labs reviewed  Vital  "signs reviewed  PHYSICAL EXAM  Cardiovascular chest abdominal neurological examination is normal      No results found for: \"PR1\", \"BMPR1A\", \"CMPLAS\", \"SL2IWYZW\", \"KPSAT\"   Lab Results   Component Value Date    CHOL 250 (H) 01/12/2023    CHHDL 3.0 01/12/2023                " admission

## 2024-04-08 ENCOUNTER — NON-APPOINTMENT (OUTPATIENT)
Age: 69
End: 2024-04-08

## 2024-04-09 ENCOUNTER — APPOINTMENT (OUTPATIENT)
Dept: OTOLARYNGOLOGY | Facility: CLINIC | Age: 69
End: 2024-04-09
Payer: COMMERCIAL

## 2024-04-09 VITALS
WEIGHT: 172 LBS | HEART RATE: 82 BPM | BODY MASS INDEX: 23.3 KG/M2 | RESPIRATION RATE: 17 BRPM | DIASTOLIC BLOOD PRESSURE: 67 MMHG | SYSTOLIC BLOOD PRESSURE: 157 MMHG | OXYGEN SATURATION: 97 % | HEIGHT: 72 IN

## 2024-04-09 DIAGNOSIS — R49.0 DYSPHONIA: ICD-10-CM

## 2024-04-09 DIAGNOSIS — C32.9 MALIGNANT NEOPLASM OF LARYNX, UNSPECIFIED: ICD-10-CM

## 2024-04-09 PROCEDURE — 31575 DIAGNOSTIC LARYNGOSCOPY: CPT

## 2024-04-09 PROCEDURE — 99213 OFFICE O/P EST LOW 20 MIN: CPT | Mod: 25

## 2024-04-09 RX ORDER — LOSARTAN POTASSIUM 50 MG/1
50 TABLET, FILM COATED ORAL
Qty: 30 | Refills: 5 | Status: COMPLETED | COMMUNITY
Start: 2022-04-04 | End: 2024-04-09

## 2024-04-09 NOTE — PHYSICAL EXAM
[Normal] : mucosa is normal [Midline] : trachea is located in midline position [Laryngoscopy Performed] : laryngoscopy was performed, see procedure section for findings

## 2024-04-09 NOTE — PROCEDURE
[Hoarseness] : hoarseness not clearly evaluated by indirect laryngoscopy [None] : none [Flexible Endoscope] : examined with the flexible endoscope [Serial Number: ___] : Serial Number: [unfilled]

## 2024-04-24 RX ORDER — SILDENAFIL 20 MG/1
20 TABLET ORAL
Qty: 30 | Refills: 1 | Status: ACTIVE | COMMUNITY
Start: 2020-11-02 | End: 1900-01-01

## 2024-04-27 ENCOUNTER — APPOINTMENT (OUTPATIENT)
Dept: CT IMAGING | Facility: CLINIC | Age: 69
End: 2024-04-27
Payer: COMMERCIAL

## 2024-04-27 ENCOUNTER — OUTPATIENT (OUTPATIENT)
Dept: OUTPATIENT SERVICES | Facility: HOSPITAL | Age: 69
LOS: 1 days | End: 2024-04-27
Payer: COMMERCIAL

## 2024-04-27 DIAGNOSIS — Z98.890 OTHER SPECIFIED POSTPROCEDURAL STATES: Chronic | ICD-10-CM

## 2024-04-27 DIAGNOSIS — Z90.79 ACQUIRED ABSENCE OF OTHER GENITAL ORGAN(S): Chronic | ICD-10-CM

## 2024-04-27 DIAGNOSIS — Z92.3 PERSONAL HISTORY OF IRRADIATION: Chronic | ICD-10-CM

## 2024-04-27 DIAGNOSIS — I71.21 ANEURYSM OF THE ASCENDING AORTA, WITHOUT RUPTURE: ICD-10-CM

## 2024-04-27 DIAGNOSIS — Z92.21 PERSONAL HISTORY OF ANTINEOPLASTIC CHEMOTHERAPY: Chronic | ICD-10-CM

## 2024-04-27 PROCEDURE — 71275 CT ANGIOGRAPHY CHEST: CPT

## 2024-04-27 PROCEDURE — 71275 CT ANGIOGRAPHY CHEST: CPT | Mod: 26,MH

## 2024-04-30 ENCOUNTER — APPOINTMENT (OUTPATIENT)
Dept: INTERNAL MEDICINE | Facility: CLINIC | Age: 69
End: 2024-04-30
Payer: COMMERCIAL

## 2024-04-30 VITALS
BODY MASS INDEX: 23.3 KG/M2 | HEIGHT: 72 IN | WEIGHT: 172 LBS | RESPIRATION RATE: 17 BRPM | SYSTOLIC BLOOD PRESSURE: 135 MMHG | DIASTOLIC BLOOD PRESSURE: 81 MMHG | HEART RATE: 77 BPM | OXYGEN SATURATION: 97 % | TEMPERATURE: 97.8 F

## 2024-04-30 DIAGNOSIS — Z00.00 ENCOUNTER FOR GENERAL ADULT MEDICAL EXAMINATION W/OUT ABNORMAL FINDINGS: ICD-10-CM

## 2024-04-30 PROCEDURE — G0444 DEPRESSION SCREEN ANNUAL: CPT | Mod: 59

## 2024-04-30 PROCEDURE — 99406 BEHAV CHNG SMOKING 3-10 MIN: CPT

## 2024-04-30 PROCEDURE — 99387 INIT PM E/M NEW PAT 65+ YRS: CPT

## 2024-05-03 ENCOUNTER — LABORATORY RESULT (OUTPATIENT)
Age: 69
End: 2024-05-03

## 2024-05-08 ENCOUNTER — APPOINTMENT (OUTPATIENT)
Dept: VASCULAR SURGERY | Facility: CLINIC | Age: 69
End: 2024-05-08
Payer: COMMERCIAL

## 2024-05-08 VITALS
BODY MASS INDEX: 22.89 KG/M2 | HEART RATE: 76 BPM | WEIGHT: 169 LBS | RESPIRATION RATE: 17 BRPM | HEIGHT: 72 IN | TEMPERATURE: 97.7 F | SYSTOLIC BLOOD PRESSURE: 142 MMHG | DIASTOLIC BLOOD PRESSURE: 70 MMHG

## 2024-05-08 DIAGNOSIS — K55.1 CHRONIC VASCULAR DISORDERS OF INTESTINE: ICD-10-CM

## 2024-05-08 DIAGNOSIS — I73.9 PERIPHERAL VASCULAR DISEASE, UNSPECIFIED: ICD-10-CM

## 2024-05-08 PROCEDURE — 99213 OFFICE O/P EST LOW 20 MIN: CPT

## 2024-05-08 PROCEDURE — 93975 VASCULAR STUDY: CPT

## 2024-05-08 PROCEDURE — 93922 UPR/L XTREMITY ART 2 LEVELS: CPT

## 2024-05-08 RX ORDER — ASPIRIN 81 MG
81 TABLET, DELAYED RELEASE (ENTERIC COATED) ORAL
Refills: 0 | Status: ACTIVE | COMMUNITY

## 2024-05-08 NOTE — PHYSICAL EXAM
[Normal Breath Sounds] : Normal breath sounds [Respiratory Effort] : normal respiratory effort [Normal Heart Sounds] : normal heart sounds [Normal Rate and Rhythm] : normal rate and rhythm [Femoral Arteries] : Normal femoral pulses [2+] : right 2+ [Varicose Veins Of Lower Extremities] : bilaterally [Ankle Swelling On The Right] : mild [No Rash or Lesion] : No rash or lesion [Calm] : calm [0] : left 0 [JVD] : no jugular venous distention  [Carotid Bruits] : no carotid bruits [Left Carotid Bruit] : no bruit heard over the left carotid [Right Carotid Bruit] : no bruit heard over the right carotid [Ankle Swelling (On Exam)] : not present [de-identified] : Well-appearing  [de-identified] : EOMI, anicteric, CN II-XII grossly intact [de-identified] : Supple [de-identified] : soft, nt, nd [de-identified] : motor and sensation intact in all four extremities  [de-identified] : A&Ox4

## 2024-05-08 NOTE — ASSESSMENT
[FreeTextEntry1] : 69 year old man with history of squamous ca of glottis s/p chemoradiation, here for evaluation of asymptomatic SMA stenosis.   1. Asymptomatic SMA stenosis - Reviewed results of duplex. Stable SMA stenosis.  - No urgent surgical intervention is needed at this time as patient is asymptomatic. - Patient educated on symptoms of mesenteric ischemia. - Recommend medical management and optimization. Continue to take aspirin and simvastatin.   2. Mild carotid stenosis - Last duplex with < 50% stenosis bilaterally. Remains asymptomatic - Will repeat carotid duplex at next visit.  3. PAD - ABIs with left leg disease. No symptoms.  - Medical management of PAD with aspirin 81 mg daily, simvastatin.   4. Venous insufficiency, CEAP C2 - Asymptomatic. Denies pain or LE symptoms.  - No intervention needed.

## 2024-05-08 NOTE — HISTORY OF PRESENT ILLNESS
[FreeTextEntry1] : ZOË KONG is a 65 year old man with history of squamous cell ca of glottis s/p chemoradiation 2.5 years ago, now in remission, presents for evaluation of SMA stenosis seen on surveillance CT scan.\par  Patient denies any abdominal pain, nausea, or vomiting. No diarrhea or constipation. No history of food fear or recent weight loss. He states that he lost weight when he underwent chemotherapy but since then has regained that weight back. \par  He is a former smoker (>1ppd for 35 years) but still smokes 4 ecigarettes per day.\par  He has stopped alcohol use at the time of his cancer diagnosis. He does not use any other substances. \par  \par  1/28/21 pt presents today for follow up regarding SMA stenosis. He remains asymptomatic and denies any new complaints or changes. No recent weight loss or abdominal pain. He is still smoking e-cigarettes. He has not been taking ASA 81mg. \par  \par  7/29/2021 - Pt presents for follow up. Patient is scheduled for prostate surgery on August 10, 2021. Here for vascular evaluation prior to surgery. \par  Denies lower extremity symptoms - no symptoms of claudication or pain. Reports having been active all summer without issues. \par  In regards to SMA stenosis, remains asymptomatic. No postprandial pain. No nausea or vomiting, no changes to bowel habits. \par  In regards to carotid stenosis, remains asymptomatic. No episodes of unilateral numbness or weakness, no motor or sensory loss. No amaurosis. Denies symptoms of stroke or TIA. \par  \par  2/1/2022-Pt presents for follow up. Since last visit, patient underwent prostate surgery and has recovered. Denies nausea or vomiting. No post-prandial pain. Eating well without issues. No recent weight loss. No episodes of unilateral or weakness, no motor or sensory loss. No amaurosis fugax. No slurred speech. No stroke or TIA symptoms. Continues to smoke e-cigarettes intermittently.  [de-identified] : 5/3/2023 - Pt presents for follow up visit. Since last visit, patient reports voice hoarseness for which he has been evaluated by Dr. Mandel from ENT. Follows w/ speech pathology. No abdominal pain with eating. No nausea or vomiting. No changes to bowel habits. No episodes of unilateral numbness or weakness. No amaurosis. No lower extremity pain or symptoms of claudication. Patient has stopped taking aspirin 81 mg.  5/8/2024 - Pt presents for follow up visit. Doing well since last visit. Denies abdominal pain, nausea or vomiting. No recent weight loss. Denies any leg pain, no issues with walking and without symptoms of claudication. Continues to take aspirin 81 mg and statin. No episodes of unilateral numbness or weakness.

## 2024-05-08 NOTE — DATA REVIEWED
[FreeTextEntry1] : 5/8/2024 - Mesenteric duplex Stable > 70% SMA stenosis.   5/8/2024 - DANA R DANA 1.08, L DANA 0.86 ------------------------------------- 2/1/2022-Mesenteric duplex. >70% SMA stenosis. ---------------------------- 7/29/2021-Mesenteric duplex Technically difficult due to bowel gas. > 70% SMA stenosis.  7/29/2021- Carotid duplex BL Carotid < 50% stenosis. antegrade vertebral flow.  --------------------------------------------- 1/28/21- Mesenteric Duplex  Technically difficult due to bowel gas Evidence of stenosis of SMA 70-99% ---------------------------------------------- 7/23/2020 - Carotid duplex BLE < 50% ICA stenosis  7/23/2020 - Mesenteric duplex  Technically difficult due to bowel gas. Suspected >70% stenosis SMA,

## 2024-05-09 ENCOUNTER — APPOINTMENT (OUTPATIENT)
Dept: CARDIOTHORACIC SURGERY | Facility: CLINIC | Age: 69
End: 2024-05-09
Payer: MEDICARE

## 2024-05-09 DIAGNOSIS — E78.5 HYPERLIPIDEMIA, UNSPECIFIED: ICD-10-CM

## 2024-05-09 DIAGNOSIS — E03.9 HYPOTHYROIDISM, UNSPECIFIED: ICD-10-CM

## 2024-05-09 DIAGNOSIS — I10 ESSENTIAL (PRIMARY) HYPERTENSION: ICD-10-CM

## 2024-05-09 PROCEDURE — 99443: CPT | Mod: 93

## 2024-05-09 NOTE — HISTORY OF PRESENT ILLNESS
[FreeTextEntry1] : This is a 69 year old male with past medical history of Small cell Carcinoma of Larynx( S/P Chemo/Radiation completed on 4/2018) , COPD, Tubular adenoma of Colon( s/p Polypectomy) and Aortic root aneurysm. He had an incidental finding on CT  Chest revealed enlarged aorta.  He is here for telephone visit with recent imaging.   Patient is doing well  and denies recent hospitalization, ER visits, or surgeries. He denies fever, chills, fatigue, headache, blurred vision, dizziness, syncope, chest pain, palpitations, shortness of breath, orthopnea, paroxysmal nocturnal dyspnea, nausea, vomiting, abdominal pain, back pain, headache, visual disturbances, CVA, PE, DVT, D/C, hematochezia, melena, dysuria, hematuria,  BRBPR or swelling to legs.  Home BP: 130 - 140/70 mm hg

## 2024-05-09 NOTE — CONSULT LETTER
[Dear  ___] : Dear ~KRYS, [Courtesy Letter:] : I had the pleasure of seeing your patient, [unfilled], in my office today. [Please see my note below.] : Please see my note below. [Consult Closing:] : Thank you very much for allowing me to participate in the care of this patient.  If you have any questions, please do not hesitate to contact me. [Sincerely,] : Sincerely, [FreeTextEntry2] : Regis Mandel M.D.\par  Head and Neck Surgery\par  [facility]   \par  [address]\[place]  [place], [place], [address]\par  [phone]  Fax:  [phone] [FreeTextEntry3] : Derick Gra MD\par  Director\par  [facility]\par  Professor \par  Cardiovascular & Thoracic Surgery\par  [employer]  [employer].\par  \par

## 2024-05-09 NOTE — END OF VISIT
TC on call provider patient's symptoms and concerns and request for Paxlovid  On call going to contact patient for virtual visit; spoke with daughter and asked to call patient directly  Provided on call with patients phone number  [Time Spent: ___ minutes] : I have spent [unfilled] minutes of time on the encounter.

## 2024-05-09 NOTE — REASON FOR VISIT
[Home] : at home, [unfilled] , at the time of the visit. [Medical Office: ([facility])___] : at the medical office located in  [Spouse] : spouse [Verbal consent obtained from patient] : the patient, [unfilled] [Follow-Up: _____] : a [unfilled] follow-up visit

## 2024-05-09 NOTE — ASSESSMENT
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 He had an incidental finding on CT  Chest revealed enlarged aorta.  He is here for telephone visit with recent imaging.   Thoracic aorta measures up to 4 cm at the sinuses of Valsalva and 3.9 cm in the mid ascending segment. No dissection. Stable sub-4 mm pulmonary nodule since 2015, on a background of emphysema.  The patient's medical records and diagnostic images were reviewed at the time of this office visit, and the following recommendation was made. Patient does not meet criteria for surgical intervention at the time and will be entered into the aortic registry surveillance program.   Plan  1. [facility] with CTA Chest 2. Continue medication regimen. 3. Follow up with cardiologist and PCP. 4. BP control- I have recommended the patient to monitor his blood pressure closely. I have also advised the patient to take daily blood pressures at home and adhere to medication regimen. 5. Discussed signs and symptoms that warrant emergency medical attention  6. Patient stated that he is aware about the pulmonary nodule and followed by Pulmonologist

## 2024-05-18 NOTE — ADDENDUM
[FreeTextEntry1] : I, Sonia Chacon, am scribing for and in the presence of Dr. Fallon Hanson DO on 04/30/2024.   All medical record entries made by the scribe were at my, Dr. Fallon Hanson, direction and personally dictated by me on 04/30/2024. I have reviewed the chart and agree that the record accurately reflects my personal performance of the history, physical exam, assessment and plan. I have also personally directed, reviewed, and agreed with the chart.

## 2024-05-18 NOTE — HEALTH RISK ASSESSMENT
[Very Good] : ~his/her~ current health as very good [Good] : ~his/her~  mood as  good [Yes] : Yes [No falls in past year] : Patient reported no falls in the past year [0] : 2) Feeling down, depressed, or hopeless: Not at all (0) [PHQ-2 Negative - No further assessment needed] : PHQ-2 Negative - No further assessment needed [Patient reported colonoscopy was abnormal] : Patient reported colonoscopy was abnormal [With Family] : lives with family [# of Members in Household ___] :  household currently consist of [unfilled] member(s) [Retired] : retired [High School] : high school [] :  [# Of Children ___] : has [unfilled] children [Feels Safe at Home] : Feels safe at home [Fully functional (bathing, dressing, toileting, transferring, walking, feeding)] : Fully functional (bathing, dressing, toileting, transferring, walking, feeding) [Fully functional (using the telephone, shopping, preparing meals, housekeeping, doing laundry, using] : Fully functional and needs no help or supervision to perform IADLs (using the telephone, shopping, preparing meals, housekeeping, doing laundry, using transportation, managing medications and managing finances) [Reports normal functional visual acuity (ie: able to read med bottle)] : Reports normal functional visual acuity [Reports changes in dental health] : Reports changes in dental health [Smoke Detector] : smoke detector [Carbon Monoxide Detector] : carbon monoxide detector [Seat Belt] :  uses seat belt [Sunscreen] : uses sunscreen [Current] : Current [20 or more] : 20 or more [Monthly or less (1 pt)] : Monthly or less (1 point) [1 or 2 (0 pts)] : 1 or 2 (0 points) [Never (0 pts)] : Never (0 points) [Little interest or pleasure doing things] : 1) Little interest or pleasure doing things [Feeling down, depressed, or hopeless] : 2) Feeling down, depressed, or hopeless [None] : None [de-identified] : Occasionally. 1-2 glasses of wine a week. [de-identified] : Maintains active by walking. [de-identified] : Maintains a healthy diet.  [[id number]] : 0 [Reports changes in hearing] : Reports no changes in hearing [Reports changes in vision] : Reports no changes in vision [Travel to Developing Areas] : does not  travel to developing areas [TB Exposure] : is not being exposed to tuberculosis [Caregiver Concerns] : does not have caregiver concerns [ColonoscopyDate] : 07/2021 [ColonoscopyComments] : Mild diverticulosis, one benign polyp removed. Was told to return in 3 years.  [de-identified] : Follows with ophthalmologist. Wears progressive lenses.  [de-identified] : Lossing teeth, upper and lower bridges. Follow with dentist.  [de-identified] : Vaping nicotine

## 2024-05-18 NOTE — COUNSELING
[Use of nicotine replacement therapies and other medications discussed] : Use of nicotine replacement therapies and other medications discussed [Encouraged to pick a quit date and identify support needed to quit] : Encouraged to pick a quit date and identify support needed to quit [Yes] : Willing to quit smoking [FreeTextEntry1] : 4

## 2024-05-18 NOTE — ASSESSMENT
[FreeTextEntry1] : Physical   Pt has an ana laura't with GI in August for colonoscopy  Current nicotine vaping smoking cessation discussed with patient.  HLD cont Simvastatin 20mg daily Reinforced the importance of following a low cholesterol/low fat diet and increased physical activity. We'll check Lipid panel and LFTs today.  HTN Reinforced the importance of following a low sodium diet/increased physical activity.   Larynx cancer in remission (2018) follows with oncology  Prostate cancer (2021), prostatic hyperplasia cont Sildenafil 20mg daily follows with urology yearly  follows with oncology yearly  Blood work ordered. Follow up in one week for lab results

## 2024-05-18 NOTE — HISTORY OF PRESENT ILLNESS
[de-identified] : Mr. ZOË KONG is a 69-year-old male, accompanied by his wife, with hx. of COPD, CAD, Larynx CA (2018), s/p chemo, s/p radiation, PAD, and prostate CA (2021), who presents to establish care and for an annual physical exam.   Pt states he is feeling well. Follows with oncology for Laryngeal Ca Also follows with another oncologist as well as a urologist yearly to check his PSA for hx. of prostate cancer.  Denies any SOB, CP, abdominal pain, N/V/D, headache, dizziness, or leg swelling. Reports compliance with taking his meds daily. Offers no complaints.

## 2024-05-22 ENCOUNTER — APPOINTMENT (OUTPATIENT)
Dept: VASCULAR SURGERY | Facility: CLINIC | Age: 69
End: 2024-05-22
Payer: COMMERCIAL

## 2024-05-22 ENCOUNTER — NON-APPOINTMENT (OUTPATIENT)
Age: 69
End: 2024-05-22

## 2024-05-22 PROCEDURE — 93880 EXTRACRANIAL BILAT STUDY: CPT

## 2024-06-10 ENCOUNTER — APPOINTMENT (OUTPATIENT)
Dept: GASTROENTEROLOGY | Facility: CLINIC | Age: 69
End: 2024-06-10
Payer: COMMERCIAL

## 2024-06-10 VITALS
OXYGEN SATURATION: 95 % | TEMPERATURE: 98.3 F | RESPIRATION RATE: 16 BRPM | DIASTOLIC BLOOD PRESSURE: 92 MMHG | SYSTOLIC BLOOD PRESSURE: 154 MMHG | HEIGHT: 72 IN | HEART RATE: 72 BPM | WEIGHT: 174 LBS | BODY MASS INDEX: 23.57 KG/M2

## 2024-06-10 DIAGNOSIS — D12.6 BENIGN NEOPLASM OF COLON, UNSPECIFIED: ICD-10-CM

## 2024-06-10 DIAGNOSIS — I77.9 DISORDER OF ARTERIES AND ARTERIOLES, UNSPECIFIED: ICD-10-CM

## 2024-06-10 DIAGNOSIS — Z85.46 PERSONAL HISTORY OF MALIGNANT NEOPLASM OF PROSTATE: ICD-10-CM

## 2024-06-10 DIAGNOSIS — Z85.819 PERSONAL HISTORY OF MALIGNANT NEOPLASM OF UNSPECIFIED SITE OF LIP, ORAL CAVITY, AND PHARYNX: ICD-10-CM

## 2024-06-10 DIAGNOSIS — I71.21 ANEURYSM OF THE ASCENDING AORTA, WITHOUT RUPTURE: ICD-10-CM

## 2024-06-10 PROCEDURE — 99214 OFFICE O/P EST MOD 30 MIN: CPT

## 2024-06-10 RX ORDER — SODIUM SULFATE, POTASSIUM SULFATE AND MAGNESIUM SULFATE 1.6; 3.13; 17.5 G/177ML; G/177ML; G/177ML
17.5-3.13-1.6 SOLUTION ORAL
Qty: 1 | Refills: 0 | Status: ACTIVE | COMMUNITY
Start: 2018-07-23 | End: 1900-01-01

## 2024-06-10 NOTE — REVIEW OF SYSTEMS
[As Noted in HPI] : as noted in HPI [Negative] : Heme/Lymph [FreeTextEntry4] : cancer of glottis [FreeTextEntry5] : Carotid disease, Toracic aneurysm

## 2024-06-10 NOTE — HISTORY OF PRESENT ILLNESS
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  Course was completed in 4/2018.  He is doing well with no evidence of residual disease.  Patient also has a history of excision of a sigmoid colon polyp with high-grade dysplasia.  His bowel movements are now much better on fiber supplements and Colace.  He denies seeing any blood in the stool.  His weight is stable.  He presents for a surveillance colonoscopy.  Patient was found to have SMA stenosis on surveillance CT.  He does not complain of any postprandial pain.  Patient was diagnosed with prostate cancer and has undergone robotic surgery.  8/14/2023-patient with normal bowel movements.  He has no abdominal pain.  He had his last colonoscopy in 7/19/2021.  This revealed hyperplastic rectal polyp.  He did have 2 small AVMs in the cecum which were nonbleeding. He also has no upper gastrointestinal symptoms.  His weight is stable.  He was treated for prostate cancer and his latest PSA as a is less than 0.01.  He did have blood work that revealed sodium of 131.  6/10/2024-patient presents for his surveillance colonoscopy.  He did have high-grade dysplasia and a colonic adenoma in the sigmoid colon about 6 years ago.  His last colonoscopy was in 7/2021.  He had several hyperplastic rectal polyps.  He also had 2 small nonbleeding AVMs in the cecum. 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  He has also had prostate cancer which was treated with robotic surgery. He has seen vascular surgery and cardiothoracic surgery for carotid artery stenosis and a thoracic aortic aneurysm.  Both of these do not require surgery and are being monitored. Blood work from 5/3/2024 revealed H/H16.6/49.6, Urinalysis and CHEM screen profile were normal.  TSH was 8.9.  Last year it was 5.4.

## 2024-06-10 NOTE — REASON FOR VISIT
[Follow-up] : a follow-up of an existing diagnosis [FreeTextEntry1] : Elevated TSH, Surveillance colonoscopy

## 2024-06-10 NOTE — ASSESSMENT
[FreeTextEntry1] : Patient with history of high-grade dysplasia and a polyp.  He will be scheduled for his surveillance colonoscopy. He was found to have an elevated TSH and was told to speak to his PMD about this and possibly starting a small dose of thyroid supplement.

## 2024-06-11 ENCOUNTER — APPOINTMENT (OUTPATIENT)
Dept: INTERNAL MEDICINE | Facility: CLINIC | Age: 69
End: 2024-06-11
Payer: COMMERCIAL

## 2024-06-11 DIAGNOSIS — R79.89 OTHER SPECIFIED ABNORMAL FINDINGS OF BLOOD CHEMISTRY: ICD-10-CM

## 2024-06-11 LAB
25(OH)D3 SERPL-MCNC: 41.7 NG/ML
ALBUMIN SERPL ELPH-MCNC: 4.7 G/DL
ALP BLD-CCNC: 67 U/L
ALT SERPL-CCNC: 21 U/L
ANION GAP SERPL CALC-SCNC: 14 MMOL/L
APPEARANCE: CLEAR
AST SERPL-CCNC: 29 U/L
BASOPHILS # BLD AUTO: 0.08 K/UL
BASOPHILS NFR BLD AUTO: 1 %
BILIRUB SERPL-MCNC: 0.6 MG/DL
BILIRUBIN URINE: NEGATIVE
BLOOD URINE: NEGATIVE
BUN SERPL-MCNC: 20 MG/DL
CALCIUM SERPL-MCNC: 9.2 MG/DL
CHLORIDE SERPL-SCNC: 97 MMOL/L
CHOLEST SERPL-MCNC: 210 MG/DL
CO2 SERPL-SCNC: 25 MMOL/L
COLOR: YELLOW
CREAT SERPL-MCNC: 1 MG/DL
EGFR: 82 ML/MIN/1.73M2
EOSINOPHIL # BLD AUTO: 0.86 K/UL
EOSINOPHIL NFR BLD AUTO: 11.1 %
ESTIMATED AVERAGE GLUCOSE: 117 MG/DL
GLUCOSE QUALITATIVE U: NEGATIVE MG/DL
GLUCOSE SERPL-MCNC: 90 MG/DL
HBA1C MFR BLD HPLC: 5.7 %
HCT VFR BLD CALC: 49.6 %
HDLC SERPL-MCNC: 51 MG/DL
HGB BLD-MCNC: 16.6 G/DL
IMM GRANULOCYTES NFR BLD AUTO: 0.3 %
KETONES URINE: NEGATIVE MG/DL
LDLC SERPL CALC-MCNC: 145 MG/DL
LEUKOCYTE ESTERASE URINE: NEGATIVE
LYMPHOCYTES # BLD AUTO: 1.38 K/UL
LYMPHOCYTES NFR BLD AUTO: 17.9 %
MAN DIFF?: NORMAL
MCHC RBC-ENTMCNC: 32.4 PG
MCHC RBC-ENTMCNC: 33.5 GM/DL
MCV RBC AUTO: 96.7 FL
MONOCYTES # BLD AUTO: 0.76 K/UL
MONOCYTES NFR BLD AUTO: 9.8 %
NEUTROPHILS # BLD AUTO: 4.63 K/UL
NEUTROPHILS NFR BLD AUTO: 59.9 %
NITRITE URINE: NEGATIVE
NONHDLC SERPL-MCNC: 159 MG/DL
PH URINE: 7.5
PLATELET # BLD AUTO: 246 K/UL
POTASSIUM SERPL-SCNC: 4.4 MMOL/L
PROT SERPL-MCNC: 7.2 G/DL
PROTEIN URINE: NEGATIVE MG/DL
PSA SERPL-MCNC: <0.01 NG/ML
RBC # BLD: 5.13 M/UL
RBC # FLD: 12 %
SODIUM SERPL-SCNC: 136 MMOL/L
SPECIFIC GRAVITY URINE: 1.01
TRIGL SERPL-MCNC: 80 MG/DL
TSH SERPL-ACNC: 8.92 UIU/ML
UROBILINOGEN URINE: 0.2 MG/DL
VIT B12 SERPL-MCNC: 603 PG/ML
WBC # FLD AUTO: 7.73 K/UL

## 2024-06-11 PROCEDURE — 99214 OFFICE O/P EST MOD 30 MIN: CPT

## 2024-06-13 RX ORDER — LEVOTHYROXINE SODIUM 0.05 MG/1
50 TABLET ORAL
Qty: 90 | Refills: 1 | Status: ACTIVE | COMMUNITY
Start: 2024-06-11 | End: 1900-01-01

## 2024-06-16 NOTE — ADDENDUM
[FreeTextEntry1] : Documented by Sonia Chacon acting as a scribe for Dr. Fallon Hanson. 06/11/2024   All medical record entries made by the scribe were at my, Dr. Fallon Hanson, direction and personally dictated by me on 06/11/2024. I have reviewed the chart and agree that the record accurately reflects my personal performance of the history, physical exam, assessment and plan. I have also personally directed, reviewed, and agreed with the chart.

## 2024-06-16 NOTE — HISTORY OF PRESENT ILLNESS
[Home] : at home, [unfilled] , at the time of the visit. [Medical Office: ([facility])___] : at the medical office located in  [Verbal consent obtained from patient] : the patient, [unfilled] [de-identified] : Mr. ZOË KONG is a 69 year old male with a Hx of HTN, HLD, COPD, CAD, Larynx CA (2018), s/p chemo, s/p radiation, PAD, prostate CA (2021), elevated TSH, seen in telemedicine for a follow up on labs   Pt states he is feeling well.  Denies any SOB, CP, abdominal pain, N/V/D, headache, dizziness, or leg swelling. Reports compliance with taking his meds daily.

## 2024-06-16 NOTE — ASSESSMENT
[FreeTextEntry1] : Follow up  elevated TSH -? secondary to radiation therapy given Hx larynx cancer start levothyroxine 50mcg daily - Rx sent to pharmacy. referred to endocrinology referred for US thyroid We'll recheck TSH/T4 in one month -bloodwork ordered today  HLD cont Simvastatin 20mg daily Reinforced the importance of following a low cholesterol/low fat diet  HTN Reinforced the importance of following a low sodium diet  Larynx cancer in remission (2018) follows with oncology  Prostate cancer (2021), prostatic hyperplasia cont Sildenafil 20mg daily follows with urology yearly follows with oncology yearly  f/u in one month for repeat bloodwork

## 2024-08-15 ENCOUNTER — APPOINTMENT (OUTPATIENT)
Dept: GASTROENTEROLOGY | Facility: AMBULATORY MEDICAL SERVICES | Age: 69
End: 2024-08-15
Payer: COMMERCIAL

## 2024-08-15 PROCEDURE — 45381 COLONOSCOPY SUBMUCOUS NJX: CPT | Mod: PT,59

## 2024-08-15 PROCEDURE — 45385 COLONOSCOPY W/LESION REMOVAL: CPT | Mod: PT

## 2024-09-17 ENCOUNTER — APPOINTMENT (OUTPATIENT)
Dept: ULTRASOUND IMAGING | Facility: CLINIC | Age: 69
End: 2024-09-17
Payer: COMMERCIAL

## 2024-09-17 PROCEDURE — 76536 US EXAM OF HEAD AND NECK: CPT

## 2024-09-18 ENCOUNTER — LABORATORY RESULT (OUTPATIENT)
Age: 69
End: 2024-09-18

## 2024-09-18 ENCOUNTER — APPOINTMENT (OUTPATIENT)
Dept: GASTROENTEROLOGY | Facility: CLINIC | Age: 69
End: 2024-09-18
Payer: COMMERCIAL

## 2024-09-18 VITALS
DIASTOLIC BLOOD PRESSURE: 84 MMHG | HEIGHT: 72 IN | HEART RATE: 82 BPM | TEMPERATURE: 98.3 F | BODY MASS INDEX: 22.75 KG/M2 | WEIGHT: 168 LBS | RESPIRATION RATE: 16 BRPM | SYSTOLIC BLOOD PRESSURE: 152 MMHG | OXYGEN SATURATION: 96 %

## 2024-09-18 DIAGNOSIS — D12.6 BENIGN NEOPLASM OF COLON, UNSPECIFIED: ICD-10-CM

## 2024-09-18 DIAGNOSIS — K63.5 POLYP OF COLON: ICD-10-CM

## 2024-09-18 DIAGNOSIS — C61 MALIGNANT NEOPLASM OF PROSTATE: ICD-10-CM

## 2024-09-18 PROCEDURE — 99214 OFFICE O/P EST MOD 30 MIN: CPT

## 2024-09-18 NOTE — PLAN
[facility] DIVISION OF KIDNEY DISEASES AND HYPERTENSION -- INITIAL CONSULT NOTE  --------------------------------------------------------------------------------  HPI: 79yO - Male w. COPD, acute on chronic heart failure, + CAD    denies active resting sob or chest pain    PAST HISTORY  --------------------------------------------------------------------------------  PAST MEDICAL & SURGICAL HISTORY:  Hypertension  Stroke  CHF (congestive heart failure)  Stented coronary artery  MI, old  Asthma  Atrial fibrillation  Diabetes  Pacemaker  COPD (chronic obstructive pulmonary disease)  Cardiac arrhythmia    H/O hernia repair  Cardiac pacemaker    FAMILY HISTORY:  Family history unknown: adopted    PAST SOCIAL HISTORY: No ETOH,  -------------------------------------------------------------------------------  Allergies :    penicillin (Anaphylaxis)    Standing Inpatient Medications :    ALBUTerol    0.083% 2.5 milliGRAM(s) Nebulizer every 6 hours  amiodarone    Tablet 200 milliGRAM(s) Oral daily  atorvastatin 80 milliGRAM(s) Oral at bedtime  buDESOnide  80 MICROgram(s)/formoterol 4.5 MICROgram(s) Inhaler 2 Puff(s) Inhalation two times a day  carvedilol 6.25 milliGRAM(s) Oral every 12 hours  docusate sodium 100 milliGRAM(s) Oral three times a day  furosemide   Injectable 20 milliGRAM(s) IV Push two times a day  insulin lispro (HumaLOG) corrective regimen sliding scale   SubCutaneous Before meals and at bedtime  lisinopril 5 milliGRAM(s) Oral daily  mupirocin 2% Ointment 1 Application(s) Topical two times a day  sodium chloride 0.9% lock flush 3 milliLiter(s) IV Push every 8 hours  spironolactone 25 milliGRAM(s) Oral daily  tiotropium 18 MICROgram(s) Capsule 1 Capsule(s) Inhalation daily    REVIEW OF SYSTEMS  --------------------------------------------------------------------------------  Gen: No weight changes, fatigue, fevers/chills, weakness  Skin: No rashes  Head/Eyes/Ears/Mouth: No headache; Normal hearing; Normal vision w/o blurriness;   Respiratory: No dyspnea, cough, wheezing, hemoptysis  CV: No chest pain, PND, orthopnea  GI: No abdominal pain, diarrhea, constipation, nausea, vomiting, melena, hematochezia  : No increased frequency, dysuria, hematuria, nocturia  MSK: No joint pain/swelling; no back pain; no edema  Neuro: No dizziness/lightheadedness, weakness, seizures, numbness, tingling  Heme: No easy bruising or bleeding  Endo: No heat/cold intolerance  Psych: No significant nervousness, anxiety, stress, depression    All other systems were reviewed and are negative, except as noted.    VITALS/PHYSICAL EXAM  --------------------------------------------------------------------------------  T(C): 36.6 (12-10-17 @ 05:34), Max: 36.9 (12-09-17 @ 17:23)  HR: 91 (12-10-17 @ 11:06) (87 - 92)  BP: 94/60 (12-10-17 @ 11:06) (94/60 - 115/85)  RR: 16 (12-10-17 @ 11:06) (16 - 18)  SpO2: 99% (12-10-17 @ 11:06) (97% - 99%)    12-09-17 @ 07:01  -  12-10-17 @ 07:00  --------------------------------------------------------  IN: 120 mL / OUT: 1600 mL / NET: -1480 mL    Physical Exam:  	Gen: NAD, well-appearing  	HEENT: PERRL, supple neck, clear oropharynx  	Pulm: CTA B/L  	CV: RRR, S1S2; no rub  	Back: No spinal or CVA tenderness; no sacral edema  	Abd: +BS, soft, nontender/nondistended  	: No suprapubic tenderness  	UE: Warm, FROM, no clubbing, intact strength; no edema; no asterixis  	LE: Warm, FROM, no clubbing, intact strength; no edema  	Neuro: No focal deficits, intact gait  	Psych: Normal affect and mood  	Skin: Warm, without rashes  	Vascular access: NA    LABS/STUDIES  --------------------------------------------------------------------------------              15.2   6.8   >-----------<  126      [12-10-17 @ 05:59]              45.1     141  |  102  |  37.0  ----------------------------<  153      [12-10-17 @ 05:59]  5.1   |  27.0  |  1.73        Ca     9.0     [12-10-17 @ 05:59]      Mg     2.4     [12-10-17 @ 05:59]      Phos  3.4     [12-10-17 @ 05:59]    TPro  7.0  /  Alb  3.9  /  TBili  2.2  /  DBili  0.2  /  AST  19  /  ALT  20  /  AlkPhos  90  [12-08-17 @ 13:36]    PT/INR: PT 34.9 , INR 3.10       [12-10-17 @ 05:59]  PTT: 55.4       [12-09-17 @ 03:41]    Troponin <0.01      [12-08-17 @ 13:36]  CK 37      [12-08-17 @ 13:36]    Creatinine Trend:  SCr 1.73 [12-10 @ 05:59]  SCr 1.52 [12-09 @ 03:41]  SCr 1.41 [12-08 @ 13:36]  SCr 1.60 [12-08 @ 04:21]  SCr 1.50 [12-06 @ 14:29]    Urinalysis - [12-09-17 @ 12:03]      Color Yellow / Appearance Clear / SG 1.010 / pH 6.0      Gluc Negative / Ketone Negative  / Bili Negative / Urobili Negative       Blood Negative / Protein Negative / Leuk Est Negative / Nitrite Negative      RBC  / WBC 0-2 / Hyaline  / Gran  / Sq Epi  / Non Sq Epi  / Bacteria Few    Urine Creatinine 39      [12-09-17 @ 12:50]  Urine Protein 5.0      [12-09-17 @ 12:50]  Urine Sodium 131      [12-09-17 @ 12:50]  Urine Chloride 128      [12-09-17 @ 12:50]  Urine Osmolality 414      [12-09-17 @ 12:03]    HbA1c 6.2      [12-06-17 @ 14:29]  TSH 5.82      [12-08-17 @ 13:36]    US Kidney and Bladder (12.09.17 @ 11:38)     EXAM:  US KIDNEYS AND BLADDER                          PROCEDURE DATE:  12/09/2017      INTERPRETATION:  CLINICAL INFORMATION: Renal failure.    TECHNIQUE: Sonographic evaluation of the kidneys and bladder was   performed. Grayscale and color Doppler images were acquired.    COMPARISON: None.    FINDINGS:    Right kidney: 11.4 x 5.1 x 4.1 cm. No hydronephrosis.  Left kidney: 11.8 x 5.5 x 4.6 cm. No hydronephrosis.   Urinary bladder: Distended with urine.    IMPRESSION:    No hydronephrosisof either kidney.      TAMICA VILLEDA M.D., ATTENDING RADIOLOGIST  This document has been electronically signed. Dec  9 2017 11:45AM [TextEntry] : Surveillance colonoscopy in 3 years.

## 2024-09-18 NOTE — HISTORY OF PRESENT ILLNESS
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  Course was completed in 4/2018.  He is doing well with no evidence of residual disease.  Patient also has a history of excision of a sigmoid colon polyp with high-grade dysplasia.  His bowel movements are now much better on fiber supplements and Colace.  He denies seeing any blood in the stool.  His weight is stable.  He presents for a surveillance colonoscopy.  Patient was found to have SMA stenosis on surveillance CT.  He does not complain of any postprandial pain.  Patient was diagnosed with prostate cancer and has undergone robotic surgery.  8/14/2023-patient with normal bowel movements.  He has no abdominal pain.  He had his last colonoscopy in 7/19/2021.  This revealed hyperplastic rectal polyp.  He did have 2 small AVMs in the cecum which were nonbleeding. He also has no upper gastrointestinal symptoms.  His weight is stable.  He was treated for prostate cancer and his latest PSA as a is less than 0.01.  He did have blood work that revealed sodium of 131.  6/10/2024-patient presents for his surveillance colonoscopy.  He did have high-grade dysplasia and a colonic adenoma in the sigmoid colon about 6 years ago.  His last colonoscopy was in 7/2021.  He had several hyperplastic rectal polyps.  He also had 2 small nonbleeding AVMs in the cecum. 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  He has also had prostate cancer which was treated with robotic surgery. He has seen vascular surgery and cardiothoracic surgery for carotid artery stenosis and a thoracic aortic aneurysm.  Both of these do not require surgery and are being monitored. Blood work from 5/3/2024 revealed H/H16.6/49.6, Urinalysis and CHEM screen profile were normal.  TSH was 8.9.  Last year it was 5.4.  9/18/2024-patient is status post a colonoscopy on 8/15/2024.  He had a flat polyp at the splenic flexure.  This was lifted and removed.  It was a tubular adenoma.  He feels well.

## 2024-09-18 NOTE — PHYSICAL EXAM

## 2024-09-19 LAB
T3FREE SERPL-MCNC: 2.41 PG/ML
T3RU NFR SERPL: 0.9 TBI
T4 FREE SERPL-MCNC: 1.5 NG/DL
THYROGLOB AB SERPL-ACNC: 16.7 IU/ML
THYROPEROXIDASE AB SERPL IA-ACNC: 10.2 IU/ML
TSH SERPL-ACNC: 2.82 UIU/ML

## 2024-10-26 NOTE — CONSULT NOTE ADULT - CONSULT REQUESTED BY NAME
Dear Naman Jones,    We are sorry you are not feeling well. Based on the responses you provided, it is recommended that you be seen in-person in urgent care so we can better evaluate your symptoms. Please click here to find the nearest urgent care location to you.   You will not be charged for this Visit. Thank you for trusting us with your care.    Shayna Collins MD     Dr. Scott

## 2024-11-20 ENCOUNTER — APPOINTMENT (OUTPATIENT)
Dept: VASCULAR SURGERY | Facility: CLINIC | Age: 69
End: 2024-11-20
Payer: COMMERCIAL

## 2024-11-20 ENCOUNTER — APPOINTMENT (OUTPATIENT)
Dept: VASCULAR SURGERY | Facility: CLINIC | Age: 69
End: 2024-11-20
Payer: MEDICARE

## 2024-11-20 VITALS
OXYGEN SATURATION: 99 % | TEMPERATURE: 97.3 F | WEIGHT: 170 LBS | DIASTOLIC BLOOD PRESSURE: 96 MMHG | HEART RATE: 93 BPM | BODY MASS INDEX: 23.03 KG/M2 | HEIGHT: 72 IN | SYSTOLIC BLOOD PRESSURE: 160 MMHG

## 2024-11-20 DIAGNOSIS — I77.9 DISORDER OF ARTERIES AND ARTERIOLES, UNSPECIFIED: ICD-10-CM

## 2024-11-20 DIAGNOSIS — I73.9 PERIPHERAL VASCULAR DISEASE, UNSPECIFIED: ICD-10-CM

## 2024-11-20 PROCEDURE — 93880 EXTRACRANIAL BILAT STUDY: CPT

## 2024-11-20 PROCEDURE — 99214 OFFICE O/P EST MOD 30 MIN: CPT

## 2024-11-20 PROCEDURE — 93923 UPR/LXTR ART STDY 3+ LVLS: CPT

## 2024-12-24 PROBLEM — F10.90 ALCOHOL USE: Status: ACTIVE | Noted: 2017-01-11

## 2024-12-30 ENCOUNTER — APPOINTMENT (OUTPATIENT)
Dept: ENDOCRINOLOGY | Facility: CLINIC | Age: 69
End: 2024-12-30
Payer: COMMERCIAL

## 2024-12-30 VITALS
HEIGHT: 72 IN | RESPIRATION RATE: 16 BRPM | OXYGEN SATURATION: 98 % | TEMPERATURE: 98.6 F | WEIGHT: 171 LBS | DIASTOLIC BLOOD PRESSURE: 89 MMHG | HEART RATE: 89 BPM | SYSTOLIC BLOOD PRESSURE: 160 MMHG | BODY MASS INDEX: 23.16 KG/M2

## 2024-12-30 DIAGNOSIS — R79.89 OTHER SPECIFIED ABNORMAL FINDINGS OF BLOOD CHEMISTRY: ICD-10-CM

## 2024-12-30 DIAGNOSIS — E03.9 HYPOTHYROIDISM, UNSPECIFIED: ICD-10-CM

## 2024-12-30 PROCEDURE — 99205 OFFICE O/P NEW HI 60 MIN: CPT

## 2024-12-30 PROCEDURE — 36415 COLL VENOUS BLD VENIPUNCTURE: CPT

## 2024-12-31 LAB
T3 SERPL-MCNC: 81 NG/DL
T4 FREE SERPL-MCNC: 1 NG/DL
THYROGLOB AB SERPL-ACNC: <15 IU/ML
THYROPEROXIDASE AB SERPL IA-ACNC: <9 IU/ML
TSH RECEPTOR AB: <1.1 IU/L
TSH SERPL-ACNC: 5.56 UIU/ML

## 2025-01-03 LAB — TSI ACT/NOR SER: <0.1 IU/L

## 2025-01-10 ENCOUNTER — APPOINTMENT (OUTPATIENT)
Dept: CT IMAGING | Facility: IMAGING CENTER | Age: 70
End: 2025-01-10

## 2025-01-20 ENCOUNTER — APPOINTMENT (OUTPATIENT)
Dept: CT IMAGING | Facility: IMAGING CENTER | Age: 70
End: 2025-01-20
Payer: COMMERCIAL

## 2025-01-20 ENCOUNTER — OUTPATIENT (OUTPATIENT)
Dept: OUTPATIENT SERVICES | Facility: HOSPITAL | Age: 70
LOS: 1 days | End: 2025-01-20
Payer: COMMERCIAL

## 2025-01-20 DIAGNOSIS — Z98.890 OTHER SPECIFIED POSTPROCEDURAL STATES: Chronic | ICD-10-CM

## 2025-01-20 DIAGNOSIS — Z92.21 PERSONAL HISTORY OF ANTINEOPLASTIC CHEMOTHERAPY: Chronic | ICD-10-CM

## 2025-01-20 DIAGNOSIS — Z90.79 ACQUIRED ABSENCE OF OTHER GENITAL ORGAN(S): Chronic | ICD-10-CM

## 2025-01-20 DIAGNOSIS — Z92.3 PERSONAL HISTORY OF IRRADIATION: Chronic | ICD-10-CM

## 2025-01-20 DIAGNOSIS — I73.9 PERIPHERAL VASCULAR DISEASE, UNSPECIFIED: ICD-10-CM

## 2025-01-20 PROCEDURE — 70498 CT ANGIOGRAPHY NECK: CPT | Mod: 26

## 2025-01-20 PROCEDURE — 70496 CT ANGIOGRAPHY HEAD: CPT | Mod: 26

## 2025-01-20 PROCEDURE — 70498 CT ANGIOGRAPHY NECK: CPT

## 2025-01-20 PROCEDURE — 70496 CT ANGIOGRAPHY HEAD: CPT

## 2025-01-29 ENCOUNTER — NON-APPOINTMENT (OUTPATIENT)
Age: 70
End: 2025-01-29

## 2025-02-05 ENCOUNTER — APPOINTMENT (OUTPATIENT)
Dept: VASCULAR SURGERY | Facility: CLINIC | Age: 70
End: 2025-02-05
Payer: MEDICARE

## 2025-02-05 DIAGNOSIS — I77.9 DISORDER OF ARTERIES AND ARTERIOLES, UNSPECIFIED: ICD-10-CM

## 2025-02-05 PROCEDURE — 99204 OFFICE O/P NEW MOD 45 MIN: CPT

## 2025-02-05 PROCEDURE — 99214 OFFICE O/P EST MOD 30 MIN: CPT

## 2025-02-06 RX ORDER — CLOPIDOGREL BISULFATE 75 MG/1
75 TABLET, FILM COATED ORAL
Qty: 30 | Refills: 2 | Status: ACTIVE | COMMUNITY
Start: 2025-02-06 | End: 1900-01-01

## 2025-02-17 ENCOUNTER — OUTPATIENT (OUTPATIENT)
Dept: OUTPATIENT SERVICES | Facility: HOSPITAL | Age: 70
LOS: 1 days | End: 2025-02-17
Payer: COMMERCIAL

## 2025-02-17 VITALS
TEMPERATURE: 98 F | HEIGHT: 72 IN | WEIGHT: 171.96 LBS | RESPIRATION RATE: 20 BRPM | OXYGEN SATURATION: 98 % | DIASTOLIC BLOOD PRESSURE: 85 MMHG | SYSTOLIC BLOOD PRESSURE: 167 MMHG | HEART RATE: 86 BPM

## 2025-02-17 DIAGNOSIS — Z98.890 OTHER SPECIFIED POSTPROCEDURAL STATES: Chronic | ICD-10-CM

## 2025-02-17 DIAGNOSIS — Z90.79 ACQUIRED ABSENCE OF OTHER GENITAL ORGAN(S): Chronic | ICD-10-CM

## 2025-02-17 DIAGNOSIS — I65.22 OCCLUSION AND STENOSIS OF LEFT CAROTID ARTERY: ICD-10-CM

## 2025-02-17 DIAGNOSIS — Z01.818 ENCOUNTER FOR OTHER PREPROCEDURAL EXAMINATION: ICD-10-CM

## 2025-02-17 DIAGNOSIS — I65.23 OCCLUSION AND STENOSIS OF BILATERAL CAROTID ARTERIES: ICD-10-CM

## 2025-02-17 DIAGNOSIS — Z92.3 PERSONAL HISTORY OF IRRADIATION: Chronic | ICD-10-CM

## 2025-02-17 DIAGNOSIS — Z92.21 PERSONAL HISTORY OF ANTINEOPLASTIC CHEMOTHERAPY: Chronic | ICD-10-CM

## 2025-02-17 DIAGNOSIS — C32.9 MALIGNANT NEOPLASM OF LARYNX, UNSPECIFIED: ICD-10-CM

## 2025-02-17 LAB
ANION GAP SERPL CALC-SCNC: 11 MMOL/L — SIGNIFICANT CHANGE UP (ref 5–17)
BLD GP AB SCN SERPL QL: NEGATIVE — SIGNIFICANT CHANGE UP
BUN SERPL-MCNC: 22 MG/DL — SIGNIFICANT CHANGE UP (ref 7–23)
CALCIUM SERPL-MCNC: 9.8 MG/DL — SIGNIFICANT CHANGE UP (ref 8.4–10.5)
CHLORIDE SERPL-SCNC: 98 MMOL/L — SIGNIFICANT CHANGE UP (ref 96–108)
CO2 SERPL-SCNC: 25 MMOL/L — SIGNIFICANT CHANGE UP (ref 22–31)
CREAT SERPL-MCNC: 0.94 MG/DL — SIGNIFICANT CHANGE UP (ref 0.5–1.3)
EGFR: 89 ML/MIN/1.73M2 — SIGNIFICANT CHANGE UP
GLUCOSE SERPL-MCNC: 95 MG/DL — SIGNIFICANT CHANGE UP (ref 70–99)
HCT VFR BLD CALC: 48.2 % — SIGNIFICANT CHANGE UP (ref 39–50)
HGB BLD-MCNC: 16.6 G/DL — SIGNIFICANT CHANGE UP (ref 13–17)
MCHC RBC-ENTMCNC: 32.8 PG — SIGNIFICANT CHANGE UP (ref 27–34)
MCHC RBC-ENTMCNC: 34.4 G/DL — SIGNIFICANT CHANGE UP (ref 32–36)
MCV RBC AUTO: 95.3 FL — SIGNIFICANT CHANGE UP (ref 80–100)
NRBC BLD AUTO-RTO: 0 /100 WBCS — SIGNIFICANT CHANGE UP (ref 0–0)
PA ADP PRP-ACNC: 10 PRU — LOW (ref 182–335)
PLATELET # BLD AUTO: 243 K/UL — SIGNIFICANT CHANGE UP (ref 150–400)
POTASSIUM SERPL-MCNC: 4.1 MMOL/L — SIGNIFICANT CHANGE UP (ref 3.5–5.3)
POTASSIUM SERPL-SCNC: 4.1 MMOL/L — SIGNIFICANT CHANGE UP (ref 3.5–5.3)
RBC # BLD: 5.06 M/UL — SIGNIFICANT CHANGE UP (ref 4.2–5.8)
RBC # FLD: 11.7 % — SIGNIFICANT CHANGE UP (ref 10.3–14.5)
RH IG SCN BLD-IMP: NEGATIVE — SIGNIFICANT CHANGE UP
SODIUM SERPL-SCNC: 134 MMOL/L — LOW (ref 135–145)
WBC # BLD: 7.21 K/UL — SIGNIFICANT CHANGE UP (ref 3.8–10.5)
WBC # FLD AUTO: 7.21 K/UL — SIGNIFICANT CHANGE UP (ref 3.8–10.5)

## 2025-02-17 PROCEDURE — 86901 BLOOD TYPING SEROLOGIC RH(D): CPT

## 2025-02-17 PROCEDURE — 86850 RBC ANTIBODY SCREEN: CPT

## 2025-02-17 PROCEDURE — 36415 COLL VENOUS BLD VENIPUNCTURE: CPT

## 2025-02-17 PROCEDURE — 86900 BLOOD TYPING SEROLOGIC ABO: CPT

## 2025-02-17 PROCEDURE — 85576 BLOOD PLATELET AGGREGATION: CPT

## 2025-02-17 PROCEDURE — G0463: CPT

## 2025-02-17 PROCEDURE — 80048 BASIC METABOLIC PNL TOTAL CA: CPT

## 2025-02-17 PROCEDURE — 85027 COMPLETE CBC AUTOMATED: CPT

## 2025-02-17 NOTE — H&P PST ADULT - HISTORY OF PRESENT ILLNESS
67 year old male with PMH of HTN(no meds), HLD, hypothyroidism, PAD, squamous cell ca of  larynx s/p chemo/RT completed 4/2018 , now in remission,  SMA stenosis ( asymptomatic ), denies any  postprandial pain. No nausea or vomiting, no changes to bowel habits. In regards to carotid stenosis, - Asymptomatic high grade left ICA stenosis. - recent CTA  Significant for left high grade ICA stenosis   Presents to PST for scheduled Left Transcarotid Artery Revascularization on 3/3/25.         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 No nausea or vomiting, no changes to bowel habits. In regards to carotid stenosis, - Asymptomatic high grade left ICA stenosis. - recent CTA  Significant for left high grade ICA stenosis   Presents to PST for scheduled Left Transcarotid Artery Revascularization on 3/3/25.

## 2025-02-17 NOTE — H&P PST ADULT - ASSESSMENT
ACTIVITY-  active ,walking ,shopping   Energy Expenditure(Mets):    7.25 by dasi  Symptoms-none     Airway:  normal    Mallampati-     1  Dental: Patient denies loose teeth  CAPRINI SCORE [CLOT]    AGE RELATED RISK FACTORS                                                       MOBILITY RELATED FACTORS  [ ] Age 41-60 years                                            (1 Point)                  [ ] Bed rest                                                        (1 Point)  [x ] Age: 61-74 years                                           (2 Points)                 [ ] Plaster cast                                                   (2 Points)  [ ] Age= 75 years                                              (3 Points)                 [ ] Bed bound for more than 72 hours                 (2 Points)    DISEASE RELATED RISK FACTORS                                               GENDER SPECIFIC FACTORS  [ ] Edema in the lower extremities                       (1 Point)                  [ ] Pregnancy                                                     (1 Point)  [ ] Varicose veins                                               (1 Point)                  [ ] Post-partum < 6 weeks                                   (1 Point)             [x ] BMI > 25 Kg/m2                                            (1 Point)                  [ ] Hormonal therapy  or oral contraception          (1 Point)                 [ ] Sepsis (in the previous month)                        (1 Point)                  [ ] History of pregnancy complications                 (1 point)  [ ] Pneumonia or serious lung disease                                               [ ] Unexplained or recurrent                     (1 Point)           (in the previous month)                               (1 Point)  [ ] Abnormal pulmonary function test                     (1 Point)                 SURGERY RELATED RISK FACTORS  [ ] Acute myocardial infarction                              (1 Point)                 [ ]  Section                                             (1 Point)  [ ] Congestive heart failure (in the previous month)  (1 Point)               [ ] Minor surgery                                                  (1 Point)   [ ] Inflammatory bowel disease                             (1 Point)                 [ ] Arthroscopic surgery                                        (2 Points)  [ ] Central venous access                                      (2 Points)                [x ] General surgery lasting more than 45 minutes   (2 Points)       [ ] Stroke (in the previous month)                          (5 Points)               [ ] Elective arthroplasty                                         (5 Points)                                                                                                                                               HEMATOLOGY RELATED FACTORS                                                 TRAUMA RELATED RISK FACTORS  [ ] Prior episodes of VTE                                     (3 Points)                [ ] Fracture of the hip, pelvis, or leg                       (5 Points)  [ ] Positive family history for VTE                         (3 Points)                 [ ] Acute spinal cord injury (in the previous month)  (5 Points)  [ ] Prothrombin 18709 A                                     (3 Points)                 [ ] Paralysis  (less than 1 month)                             (5 Points)  [ ] Factor V Leiden                                             (3 Points)                  [ ] Multiple Trauma within 1 month                        (5 Points)  [ ] Lupus anticoagulants                                     (3 Points)                                                           [ ] Anticardiolipin antibodies                               (3 Points)                                                       [ ] High homocysteine in the blood                      (3 Points)                                             [ ] Other congenital or acquired thrombophilia      (3 Points)                                                [ ] Heparin induced thrombocytopenia                  (3 Points)                                          Total Score [      5    ]    Caprini Score 0 - 2:  Low Risk, No VTE Prophylaxis required for most patients, encourage ambulation  Caprini Score 3 - 6:  At Risk, pharmacologic VTE prophylaxis is indicated for most patients (in the absence of a contraindication)  Caprini Score Greater than or = 7:  High Risk, pharmacologic VTE prophylaxis is indicated for most patients (in the absence of a contraindication)   ACTIVITY-  active ,walking ,shopping   Energy Expenditure(Mets):    7.25 by dasi  Symptoms-none     Airway:  normal    Mallampati-     1  Dental: Patient denies loose teeth  CAPRINI SCORE [CLOT]    AGE RELATED RISK FACTORS                                                       MOBILITY RELATED FACTORS  [ ] Age 41-60 years                                            (1 Point)                  [ ] Bed rest                                                        (1 Point)  [x ] Age: 61-74 years                                           (2 Points)                 [ ] Plaster cast                                                   (2 Points)  [ ] Age= 75 years                                              (3 Points)                 [ ] Bed bound for more than 72 hours                 (2 Points)    DISEASE RELATED RISK FACTORS                                               GENDER SPECIFIC FACTORS  [ ] Edema in the lower extremities                       (1 Point)                  [ ] Pregnancy                                                     (1 Point)  [x ] Varicose veins                                               (1 Point)                  [ ] Post-partum < 6 weeks                                   (1 Point)             [] BMI > 25 Kg/m2                                            (1 Point)                  [ ] Hormonal therapy  or oral contraception          (1 Point)                 [ ] Sepsis (in the previous month)                        (1 Point)                  [ ] History of pregnancy complications                 (1 point)  [ ] Pneumonia or serious lung disease                                               [ ] Unexplained or recurrent                     (1 Point)           (in the previous month)                               (1 Point)  [ ] Abnormal pulmonary function test                     (1 Point)                 SURGERY RELATED RISK FACTORS  [ ] Acute myocardial infarction                              (1 Point)                 [ ]  Section                                             (1 Point)  [ ] Congestive heart failure (in the previous month)  (1 Point)               [ ] Minor surgery                                                  (1 Point)   [ ] Inflammatory bowel disease                             (1 Point)                 [ ] Arthroscopic surgery                                        (2 Points)  [ ] Central venous access                                      (2 Points)                [x ] General surgery lasting more than 45 minutes   (2 Points)       [ ] Stroke (in the previous month)                          (5 Points)               [ ] Elective arthroplasty                                         (5 Points)                                                                                                                                               HEMATOLOGY RELATED FACTORS                                                 TRAUMA RELATED RISK FACTORS  [ ] Prior episodes of VTE                                     (3 Points)                [ ] Fracture of the hip, pelvis, or leg                       (5 Points)  [ ] Positive family history for VTE                         (3 Points)                 [ ] Acute spinal cord injury (in the previous month)  (5 Points)  [ ] Prothrombin 88736 A                                     (3 Points)                 [ ] Paralysis  (less than 1 month)                             (5 Points)  [ ] Factor V Leiden                                             (3 Points)                  [ ] Multiple Trauma within 1 month                        (5 Points)  [ ] Lupus anticoagulants                                     (3 Points)                                                           [ ] Anticardiolipin antibodies                               (3 Points)                                                       [ ] High homocysteine in the blood                      (3 Points)                                             [ ] Other congenital or acquired thrombophilia      (3 Points)                                                [ ] Heparin induced thrombocytopenia                  (3 Points)                                          Total Score [      5    ]    Caprini Score 0 - 2:  Low Risk, No VTE Prophylaxis required for most patients, encourage ambulation  Caprini Score 3 - 6:  At Risk, pharmacologic VTE prophylaxis is indicated for most patients (in the absence of a contraindication)  Caprini Score Greater than or = 7:  High Risk, pharmacologic VTE prophylaxis is indicated for most patients (in the absence of a contraindication)

## 2025-02-17 NOTE — H&P PST ADULT - PROBLEM SELECTOR PLAN 1
Left Transcarotid Artery Revascularization on 3/3/25.  Pre- Op Instructions discussed   Labs sent   medical eval   pt will continue ASA and Plavix,   P2Y12 sent

## 2025-02-17 NOTE — H&P PST ADULT - ATTENDING COMMENTS
Asymptomatic high grade left carotid stenosis.   Plan for TCAR.   Risks, benefits, and alternatives of the procedure were discussed with the patient. All questions were answered. He agrees to proceed with the procedure. Risks discussed included but not limited to risk of bleeding, infection, damage to surrounding structures, including nerves, stroke, need for additional procedures.

## 2025-02-17 NOTE — H&P PST ADULT - NEGATIVE CARDIOVASCULAR SYMPTOMS
Ambulatory Care Coordination Note  10/9/2023    Patient Current Location:  [place]     ACM contacted the family by telephone. Verified name and  with family as identifiers. Provided introduction to self, and explanation of the ACM role. Challenges to be reviewed by the provider   Additional needs identified to be addressed with provider: No  none               Method of communication with provider: none. ACM: Bisi Camacho RN    Ccm outreached to patient. Per daughter patient is being sent to er for stroke like symptoms. Daughter to call ccm once patient is discharged from er. Offered patient enrollment in the Remote Patient Monitoring (RPM) program for in-home monitoring: NA. Lab Results       None                 Goals Addressed                   This Visit's Progress     Conditions and Symptoms   On track     I will schedule office visits, as directed by my provider. I will keep my appointment or reschedule if I have to cancel. I will notify my provider of any barriers to my plan of care. I will notify my provider of any symptoms that indicate a worsening of my condition.     Barriers: overwhelmed by complexity of regimen  Plan for overcoming my barriers: weekly calls to review safety precautions  Confidence: 7/10  Anticipated Goal Completion Date: 24                Future Appointments   Date Time Provider [address]   2023  9:00 AM Grant Arredondo DO [facility] AMB   2023  9:20 AM [facility] AM[facility]
no chest pain/no palpitations/no dyspnea on exertion/no peripheral edema

## 2025-02-17 NOTE — H&P PST ADULT - PRIMARY CARE PROVIDER
Dr. Aramis Bradford (PCP/ Pulmonologist) [phone] ENT Regis Mandel MD  [phone] Dr amilcar Lehman [phone]   Dr. Aramis Bradford Pulmonologist) [phone],       ENT Regis Mandel MD  [phone]

## 2025-02-24 ENCOUNTER — APPOINTMENT (OUTPATIENT)
Dept: INTERNAL MEDICINE | Facility: CLINIC | Age: 70
End: 2025-02-24
Payer: COMMERCIAL

## 2025-02-24 VITALS
BODY MASS INDEX: 23.57 KG/M2 | HEART RATE: 90 BPM | WEIGHT: 174 LBS | HEIGHT: 72 IN | OXYGEN SATURATION: 96 % | RESPIRATION RATE: 18 BRPM

## 2025-02-24 DIAGNOSIS — Z01.818 ENCOUNTER FOR OTHER PREPROCEDURAL EXAMINATION: ICD-10-CM

## 2025-02-24 PROCEDURE — 93000 ELECTROCARDIOGRAM COMPLETE: CPT

## 2025-02-24 PROCEDURE — 99214 OFFICE O/P EST MOD 30 MIN: CPT

## 2025-02-25 ENCOUNTER — NON-APPOINTMENT (OUTPATIENT)
Age: 70
End: 2025-02-25

## 2025-02-25 ENCOUNTER — APPOINTMENT (OUTPATIENT)
Dept: OTOLARYNGOLOGY | Facility: CLINIC | Age: 70
End: 2025-02-25
Payer: COMMERCIAL

## 2025-02-25 VITALS — WEIGHT: 174 LBS | HEIGHT: 72 IN | BODY MASS INDEX: 23.57 KG/M2

## 2025-02-25 DIAGNOSIS — R49.0 DYSPHONIA: ICD-10-CM

## 2025-02-25 DIAGNOSIS — C32.9 MALIGNANT NEOPLASM OF LARYNX, UNSPECIFIED: ICD-10-CM

## 2025-02-25 PROCEDURE — 31575 DIAGNOSTIC LARYNGOSCOPY: CPT

## 2025-02-25 PROCEDURE — 99213 OFFICE O/P EST LOW 20 MIN: CPT | Mod: 25

## 2025-03-02 NOTE — ED ADULT NURSE NOTE - NSFALLRSKINDICATORS_ED_ALL_ED
Pt visible on the unit but withdrawn to herself. Mood improved from yesterday but irritable edge does remain. Med and meal compliant. Denies SI/HI/AH/VH at this time. Denies any unmet needs or complaints.    no

## 2025-03-03 ENCOUNTER — APPOINTMENT (OUTPATIENT)
Dept: VASCULAR SURGERY | Facility: HOSPITAL | Age: 70
End: 2025-03-03

## 2025-03-03 ENCOUNTER — INPATIENT (INPATIENT)
Facility: HOSPITAL | Age: 70
LOS: 0 days | Discharge: HOME | DRG: 68 | End: 2025-03-04
Attending: SURGERY | Admitting: SURGERY
Payer: COMMERCIAL

## 2025-03-03 VITALS
DIASTOLIC BLOOD PRESSURE: 93 MMHG | SYSTOLIC BLOOD PRESSURE: 169 MMHG | RESPIRATION RATE: 16 BRPM | HEART RATE: 77 BPM | TEMPERATURE: 98 F | OXYGEN SATURATION: 98 %

## 2025-03-03 DIAGNOSIS — Z98.890 OTHER SPECIFIED POSTPROCEDURAL STATES: Chronic | ICD-10-CM

## 2025-03-03 DIAGNOSIS — I65.23 OCCLUSION AND STENOSIS OF BILATERAL CAROTID ARTERIES: ICD-10-CM

## 2025-03-03 DIAGNOSIS — Z92.21 PERSONAL HISTORY OF ANTINEOPLASTIC CHEMOTHERAPY: Chronic | ICD-10-CM

## 2025-03-03 DIAGNOSIS — Z92.3 PERSONAL HISTORY OF IRRADIATION: Chronic | ICD-10-CM

## 2025-03-03 DIAGNOSIS — Z90.79 ACQUIRED ABSENCE OF OTHER GENITAL ORGAN(S): Chronic | ICD-10-CM

## 2025-03-03 LAB
GAS PNL BLDA: SIGNIFICANT CHANGE UP
RH IG SCN BLD-IMP: NEGATIVE — SIGNIFICANT CHANGE UP

## 2025-03-03 PROCEDURE — 37215 TRANSCATH STENT CCA W/EPS: CPT | Mod: LT

## 2025-03-03 PROCEDURE — 76937 US GUIDE VASCULAR ACCESS: CPT | Mod: 26

## 2025-03-03 DEVICE — KIT A-LINE 1LUM 20G X 12CM SAFE KIT: Type: IMPLANTABLE DEVICE | Site: LEFT | Status: FUNCTIONAL

## 2025-03-03 DEVICE — STENT TRANSCAROTID ENROUTE 8X40: Type: IMPLANTABLE DEVICE | Site: LEFT | Status: FUNCTIONAL

## 2025-03-03 DEVICE — SYS PLUS TRANSCAROTID NEUROPROTECTION: Type: IMPLANTABLE DEVICE | Site: LEFT | Status: FUNCTIONAL

## 2025-03-03 DEVICE — KIT INTRODUCER MICRO 21GAX7CM 7FR: Type: IMPLANTABLE DEVICE | Site: LEFT | Status: FUNCTIONAL

## 2025-03-03 DEVICE — GWIRE BENT 0.035INX180CM TFE: Type: IMPLANTABLE DEVICE | Site: LEFT | Status: FUNCTIONAL

## 2025-03-03 DEVICE — INTRODUCER SET MICROPUNCTURE ACCESS 21G X 7CM: Type: IMPLANTABLE DEVICE | Site: LEFT | Status: FUNCTIONAL

## 2025-03-03 DEVICE — GWIRE ENROUTE 0.014X95CM: Type: IMPLANTABLE DEVICE | Site: LEFT | Status: FUNCTIONAL

## 2025-03-03 DEVICE — SURGICEL 2 X 14": Type: IMPLANTABLE DEVICE | Site: LEFT | Status: FUNCTIONAL

## 2025-03-03 DEVICE — CATH BLLN ENROUTE ENFLATE TRANSCAROTID RX 5X35MM: Type: IMPLANTABLE DEVICE | Site: LEFT | Status: FUNCTIONAL

## 2025-03-03 RX ORDER — ONDANSETRON HCL/PF 4 MG/2 ML
4 VIAL (ML) INJECTION EVERY 6 HOURS
Refills: 0 | Status: DISCONTINUED | OUTPATIENT
Start: 2025-03-03 | End: 2025-03-03

## 2025-03-03 RX ORDER — ASPIRIN 325 MG
81 TABLET ORAL AT BEDTIME
Refills: 0 | Status: DISCONTINUED | OUTPATIENT
Start: 2025-03-03 | End: 2025-03-04

## 2025-03-03 RX ORDER — PSEUDOEPHEDRINE HCL 30 MG
60 TABLET ORAL ONCE
Refills: 0 | Status: DISCONTINUED | OUTPATIENT
Start: 2025-03-03 | End: 2025-03-03

## 2025-03-03 RX ORDER — CLOPIDOGREL BISULFATE 75 MG/1
1 TABLET, FILM COATED ORAL
Refills: 0 | DISCHARGE

## 2025-03-03 RX ORDER — CEFAZOLIN SODIUM IN 0.9 % NACL 3 G/100 ML
2000 INTRAVENOUS SOLUTION, PIGGYBACK (ML) INTRAVENOUS ONCE
Refills: 0 | Status: COMPLETED | OUTPATIENT
Start: 2025-03-03 | End: 2025-03-03

## 2025-03-03 RX ORDER — FENTANYL CITRATE-0.9 % NACL/PF 100MCG/2ML
25 SYRINGE (ML) INTRAVENOUS
Refills: 0 | Status: DISCONTINUED | OUTPATIENT
Start: 2025-03-03 | End: 2025-03-03

## 2025-03-03 RX ORDER — LIDOCAINE HCL/PF 10 MG/ML
0.2 VIAL (ML) INJECTION ONCE
Refills: 0 | Status: DISCONTINUED | OUTPATIENT
Start: 2025-03-03 | End: 2025-03-03

## 2025-03-03 RX ORDER — LEVOTHYROXINE SODIUM 300 MCG
50 TABLET ORAL DAILY
Refills: 0 | Status: DISCONTINUED | OUTPATIENT
Start: 2025-03-03 | End: 2025-03-04

## 2025-03-03 RX ORDER — ACETAMINOPHEN 500 MG/5ML
975 LIQUID (ML) ORAL EVERY 6 HOURS
Refills: 0 | Status: DISCONTINUED | OUTPATIENT
Start: 2025-03-03 | End: 2025-03-04

## 2025-03-03 RX ORDER — ENOXAPARIN SODIUM 100 MG/ML
40 INJECTION SUBCUTANEOUS EVERY 24 HOURS
Refills: 0 | Status: DISCONTINUED | OUTPATIENT
Start: 2025-03-03 | End: 2025-03-04

## 2025-03-03 RX ORDER — CLOPIDOGREL BISULFATE 75 MG/1
75 TABLET, FILM COATED ORAL AT BEDTIME
Refills: 0 | Status: DISCONTINUED | OUTPATIENT
Start: 2025-03-03 | End: 2025-03-04

## 2025-03-03 RX ORDER — HYDROMORPHONE/SOD CHLOR,ISO/PF 2 MG/10 ML
0.25 SYRINGE (ML) INJECTION
Refills: 0 | Status: DISCONTINUED | OUTPATIENT
Start: 2025-03-03 | End: 2025-03-03

## 2025-03-03 RX ORDER — POTASSIUM CHLORIDE, DEXTROSE MONOHYDRATE AND SODIUM CHLORIDE 150; 5; 900 MG/100ML; G/100ML; MG/100ML
1000 INJECTION, SOLUTION INTRAVENOUS
Refills: 0 | Status: DISCONTINUED | OUTPATIENT
Start: 2025-03-03 | End: 2025-03-04

## 2025-03-03 RX ORDER — PSEUDOEPHEDRINE HCL 30 MG
60 TABLET ORAL ONCE
Refills: 0 | Status: DISCONTINUED | OUTPATIENT
Start: 2025-03-03 | End: 2025-03-04

## 2025-03-03 RX ORDER — LEVOTHYROXINE SODIUM 300 MCG
1 TABLET ORAL
Refills: 0 | DISCHARGE

## 2025-03-03 RX ADMIN — Medication 81 MILLIGRAM(S): at 23:00

## 2025-03-03 RX ADMIN — CLOPIDOGREL BISULFATE 75 MILLIGRAM(S): 75 TABLET, FILM COATED ORAL at 23:00

## 2025-03-03 RX ADMIN — POTASSIUM CHLORIDE, DEXTROSE MONOHYDRATE AND SODIUM CHLORIDE 75 MILLILITER(S): 150; 5; 900 INJECTION, SOLUTION INTRAVENOUS at 20:38

## 2025-03-03 RX ADMIN — POTASSIUM CHLORIDE, DEXTROSE MONOHYDRATE AND SODIUM CHLORIDE 75 MILLILITER(S): 150; 5; 900 INJECTION, SOLUTION INTRAVENOUS at 13:12

## 2025-03-03 NOTE — DISCHARGE NOTE PROVIDER - CARE PROVIDER_API CALL
Kristina Rowland  Vascular Surgery  [address] [facility], [address]  [place] [address]  Phone: [phone]  Fax: [phone]  Follow Up Time: 1 week

## 2025-03-03 NOTE — DISCHARGE NOTE PROVIDER - HOSPITAL COURSE
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 No nausea or vomiting, no changes to bowel habits. In regards to carotid stenosis, - Asymptomatic high grade left ICA stenosis. - recent CTA  Significant for left high grade ICA stenosis. Presents to PST for scheduled Left Transcarotid Artery Revascularization on 3/3/25.    Pt was admitted under Surgery and taken to the OR on 03/03/25, and is s/p Lt TCAR via left vein access . The patient tolerated the procedure well (see operative report for full details). Pt was transferred to the PACU in stable condition. In the PACU, pt was closely managed for postoperative pain and blood pressure control, patient noted to have slight urinary retention on bladder scan and was straight cath x 1 and then transferred to the floor without incidence.  Pt. was mainly managed for postoperative pain, wound care, as well as close monitoring of fluid resuscitation, neurological status and electrolyte repletion.  Pt has been tolerating a diet, voiding, ambulating, and the pain is now well controlled.   Pt. is ready for discharge home in stable condition, and will follow up in two weeks as an outpatient with Dr. Rowland.        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 Now s/p Left Transcarotid Artery Revascularization on 3/3/25.    Pt was admitted under Surgery and taken to the OR on 03/03/25, and is s/p Lt TCAR via left vein access . The patient tolerated the procedure well (see operative report for full details). Pt was transferred to the PACU in stable condition. In the PACU, pt was closely managed for postoperative pain and blood pressure control, patient noted to have slight urinary retention on bladder scan and was straight cath x 1 and then transferred to the floor without incidence.  Pt. was mainly managed for postoperative pain, wound care, as well as close monitoring of fluid resuscitation, neurological status and electrolyte repletion.  Pt has been tolerating a diet, voiding, ambulating, and the pain is now well controlled.   Pt. is ready for discharge home in stable condition, and will follow up in two weeks as an outpatient with Dr. Rowland.        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 Now s/p Left Transcarotid Artery Revascularization on 3/3/25.    Pt was admitted under Surgery and taken to the OR on 03/03/25, and is s/p Lt TCAR via left vein access . The patient tolerated the procedure well (see operative report for full details). Pt was transferred to the PACU in stable condition. In the PACU, pt was closely managed for postoperative pain and blood pressure control, patient noted to have slight urinary retention on bladder scan and was straight cath x 1 and then transferred to the floor without incidence. PENDING TOV, DC WITH JIMÉNEZ AND FOLLOW UP UROLOGY IF FAIL  Pt was mainly managed for postoperative pain, wound care, as well as close monitoring of fluid resuscitation, neurological status and electrolyte repletion.  Pt has been tolerating a diet, voiding, ambulating, and the pain is now well controlled.   Pt. is ready for discharge home in stable condition, and will follow up in two weeks as an outpatient with Dr. Rowland.        69 year old male with PMH of HTN (no meds), HLD, hypothyroidism, PAD, squamous cell CA of larynx s/p chemo/RT completed 4/2018 , now in remission, SMA stenosis (asymptomatic), known carotid stenosis, recent CTA Significant for left high grade ICA stenosis.    Pt presented for scheduled OR case on 3/03/25, and is s/p Lt TCAR via left vein access. The patient tolerated the procedure well (see operative report for full details). Pt was transferred to the PACU in stable condition. In the PACU, pt was closely managed for postoperative pain and blood pressure control. Patient was noted to have slight urinary retention on bladder scan, and was straight cath x 1. Pt was then transferred to the floor without incidence. Pt was straight cathed 2nd time on 3/03-3/04 overnight. On POD #1 (3/04), pt passed TOV. Pt was mainly managed for postoperative pain, wound care, as well as close monitoring of fluid resuscitation, neurological status and electrolyte repletion.      Pt has been tolerating a diet, voiding, ambulating, and the pain is now well controlled. Patient is ready for discharge home in stable condition, and will follow up in two weeks as an outpatient with Dr. Rowland. 69 year old male with PMH of HTN (no meds), HLD, hypothyroidism, PAD, squamous cell CA of larynx s/p chemo/RT completed 4/2018 , now in remission, SMA stenosis (asymptomatic), known carotid stenosis, recent CTA Significant for left high grade ICA stenosis.    Pt presented for scheduled OR case on 3/03/25, and is s/p Lt TCAR via left neck and left femoral vein access. The patient tolerated the procedure well (see operative report for full details). Pt was transferred to the PACU in stable condition. In the PACU, pt was closely managed for postoperative pain and blood pressure control. Patient was noted to have slight urinary retention on bladder scan, and was straight cath x 1. Pt was then transferred to the floor without incidence. Pt was straight cathed 2nd time on 3/03-3/04 overnight. On POD #1 (3/04), pt passed TOV. Pt was mainly managed for postoperative pain, wound care, as well as close monitoring of fluid resuscitation, neurological status and electrolyte repletion.      Pt has been tolerating a diet, voiding, ambulating, and the pain is now well controlled. He remains neurologically intact. Patient is ready for discharge home in stable condition, and will follow up in two weeks as an outpatient with Dr. Rowland.

## 2025-03-03 NOTE — PRE-ANESTHESIA EVALUATION ADULT - NSANTHADDINFOFT_GEN_ALL_CORE
Discussed risk of voice hoarseness and sore throat secondary to intubation. We discussed that this should be temporary and we would attempt to reduce the chance of it by using Glidescope for intubation.

## 2025-03-03 NOTE — CHART NOTE - NSCHARTNOTEFT_GEN_A_CORE
General Surgery Post op Check    Pt seen and examined admits he is feeling well, did note some bleeding from left side of scalp from intra-op neuromonitoring pins, direct pressure held and no further active bleeding, gauze in place. Pt also noted to be retaining >1.2 L urine on bladder scan, straight cath x 1. Pt otherwise without complaints. Pain is controlled. Denies SOB/CP/N/V/ dizziness/weakness/ blurry vision/headaches or any other complaints.     Vital Signs Last 24 Hrs  T(C): 36.1 (03 Mar 2025 09:56), Max: 36.4 (03 Mar 2025 06:38)  T(F): 97 (03 Mar 2025 09:56), Max: 97.5 (03 Mar 2025 06:38)  HR: 67 (03 Mar 2025 13:30) (66 - 77)  BP: 148/73 (03 Mar 2025 13:30) (134/72 - 171/82)  BP(mean): 101 (03 Mar 2025 13:30) (98 - 121)  RR: 16 (03 Mar 2025 13:30) (16 - 16)  SpO2: 98% (03 Mar 2025 13:30) (97% - 100%)    Parameters below as of 03 Mar 2025 12:00  Patient On (Oxygen Delivery Method): room air        I&O's Summary    03 Mar 2025 07:01  -  03 Mar 2025 14:05  --------------------------------------------------------  IN: 150 mL / OUT: 1100 mL / NET: -950 mL        Physical Exam  Gen: NAD, A&Ox3  Neck: Trachea midline, Lt side incision C/D/I with dermabond, area soft, no swelling or signs of hematoma.   Pulm: No respiratory distress, no subcostal retractions  Abd: Soft, nontender. Lt groin dressing c/d/i, groin soft to touch, no hematoma noted.   Extremities:  FROM, warm and well perfused, equal bilateral muscle strength      A/P: 69y Male s/p Left TCAR with Left femoral vein access. Recovering appropriately.     -On DAPT (Asa/plavix)  -DVT prophylaxis w/ SCD.  Encouraged OOB  -Strict I&O's, Patient noted to be retaining >1.2L on bladder scan patient straight cath x 1, FU TOV at 9 pm   -Analgesia and antiemetics as needed  -Diet: Regular   -Keep bed elevated 30 degrees   -Goal -160's   -Monitor neurological status   -Dispo:  Okay for surgical floor after 6 hr PACU stay

## 2025-03-03 NOTE — DISCHARGE NOTE PROVIDER - NSDCMRMEDTOKEN_GEN_ALL_CORE_FT
aspirin 81 mg oral tablet: 1 tab(s) orally once a day  clopidogrel 75 mg oral tablet: 1 tab(s) orally once a day (at bedtime)  levothyroxine 50 mcg (0.05 mg) oral tablet: 1 tab(s) orally once a day  simvastatin 20 mg oral tablet: 1 tab(s) orally once a day (at bedtime)   acetaminophen 325 mg oral tablet: 3 tab(s) orally every 6 hours  aspirin 81 mg oral tablet: 1 tab(s) orally once a day  clopidogrel 75 mg oral tablet: 1 tab(s) orally once a day (at bedtime)  levothyroxine 50 mcg (0.05 mg) oral tablet: 1 tab(s) orally once a day  simvastatin 20 mg oral tablet: 1 tab(s) orally once a day (at bedtime)

## 2025-03-03 NOTE — DISCHARGE NOTE PROVIDER - NSDCFUSCHEDAPPT_GEN_ALL_CORE_FT
[facility]  CATSCAN [address]  Scheduled Appointment: 03/26/2025    Regis Mandel  [facility]  [address] [facility]  Scheduled Appointment: 04/08/2025     [facility]  VASCULAR [address]  Scheduled Appointment: 03/24/2025    Kristina Rowland  [facility]  VASCULAR [address]  Scheduled Appointment: 03/24/2025    [facility]  CATSCAN [address]  Scheduled Appointment: 03/26/2025    [facility]  VASCULAR [address]  Scheduled Appointment: 03/26/2025    Regis Mandel  [facility]  [address]  Scheduled Appointment: 04/08/2025

## 2025-03-03 NOTE — PRE-ANESTHESIA EVALUATION ADULT - NSANTHPMHFT_GEN_ALL_CORE
Medical history and ROS reviewed  h/o HTN, HLD, COPD (not on O2), superior mesenteric artery stenosis (asymptomatic), laryngeal ca s/p radiation (no current hoarseness, trachea patent on imaging, cleared by ENT), high grade left carotid stenosis (asymptomatic)  ET > 4 METS, no CP, no SOB

## 2025-03-03 NOTE — PATIENT PROFILE ADULT - FUNCTIONAL ASSESSMENT - BASIC MOBILITY SCORE.
Quality 226: Preventive Care And Screening: Tobacco Use: Screening And Cessation Intervention: Patient screened for tobacco use and is an ex/non-smoker Detail Level: Detailed Quality 130: Documentation Of Current Medications In The Medical Record: Current Medications Documented Quality 128: Preventive Care And Screening: Body Mass Index (Bmi) Screening And Follow-Up Plan: BMI not documented, reason not otherwise specified. Quality 431: Preventive Care And Screening: Unhealthy Alcohol Use - Screening: Patient not identified as an unhealthy alcohol user when screened for unhealthy alcohol use using a systematic screening method 24

## 2025-03-03 NOTE — DISCHARGE NOTE PROVIDER - NSDCCPTREATMENT_GEN_ALL_CORE_FT
PRINCIPAL PROCEDURE  Procedure: Transcarotid artery revascularization (TCAR)  Findings and Treatment: WOUND CARE: Lt groin dressing keep clean, dry and intact. Can remove dressing in 24 hrs. Lt neck/clavicle incision has dermabond, keep area clean, dry and intact. DO NOT APPLY LOTIONS OR CREAMS TO AREA.   MEDICATIONS: Continue taking home medications including aspirin and plavix.   BATHING: Please do not submerge wound underwater. You may shower and/or sponge bathe.  ACTIVITY: No heavy lifting anything more than 10-15lbs or straining. Otherwise, you may return to your usual level of physical activity. If you are taking narcotic pain medication (such as Percocet), do NOT drive a car, operate machinery or make important decisions.  DIET: Regular diet   NOTIFY YOUR SURGEON IF:  You have any HEADACHES, DIZZINESS, BLURRY VISION, WEAKNESS, bleeding that does not stop, any pus draining from your wound, any fever (over 100.4 F) or chills, persistent nausea/vomiting with inability to tolerate food or liquids, persistent diarrhea, or if your pain is not controlled on your discharge pain medications.  FOLLOW-UP:  1. Please call to make a follow-up appointment within one week of discharge with Dr. Rowland   2. Please follow up with your primary care physician in one week regarding your hospitalization.       PRINCIPAL PROCEDURE  Procedure: Transcarotid artery revascularization (TCAR)  Findings and Treatment: WOUND CARE: Lt groin dressing keep clean, dry and intact. Can remove dressing in 24 hrs. Lt neck/clavicle incision has dermabond, keep area clean, dry and intact. DO NOT APPLY LOTIONS OR CREAMS TO AREA.   MEDICATIONS: Continue taking home medications including aspirin, plavix, and simvastatin  BATHING: Please do not submerge wound underwater. You may shower and/or sponge bathe.  ACTIVITY: No heavy lifting anything more than 10-15lbs or straining. Otherwise, you may return to your usual level of physical activity. If you are taking narcotic pain medication (such as Percocet), do NOT drive a car, operate machinery or make important decisions.  DIET: Regular diet   NOTIFY YOUR SURGEON IF:  You have any HEADACHES, DIZZINESS, BLURRY VISION, WEAKNESS, bleeding that does not stop, any pus draining from your wound, any fever (over 100.4 F) or chills, persistent nausea/vomiting with inability to tolerate food or liquids, persistent diarrhea, or if your pain is not controlled on your discharge pain medications.  FOLLOW-UP:  1. Please call to make a follow-up appointment within one week of discharge with Dr. Rowland   2. Please follow up with your primary care physician in one week regarding your hospitalization.

## 2025-03-04 ENCOUNTER — TRANSCRIPTION ENCOUNTER (OUTPATIENT)
Age: 70
End: 2025-03-04

## 2025-03-04 VITALS
DIASTOLIC BLOOD PRESSURE: 84 MMHG | RESPIRATION RATE: 18 BRPM | SYSTOLIC BLOOD PRESSURE: 153 MMHG | OXYGEN SATURATION: 97 % | HEART RATE: 78 BPM | TEMPERATURE: 98 F

## 2025-03-04 LAB
A1C WITH ESTIMATED AVERAGE GLUCOSE RESULT: 5.7 % — HIGH (ref 4–5.6)
ANION GAP SERPL CALC-SCNC: 11 MMOL/L — SIGNIFICANT CHANGE UP (ref 5–17)
BUN SERPL-MCNC: 27 MG/DL — HIGH (ref 7–23)
CALCIUM SERPL-MCNC: 8.7 MG/DL — SIGNIFICANT CHANGE UP (ref 8.4–10.5)
CHLORIDE SERPL-SCNC: 100 MMOL/L — SIGNIFICANT CHANGE UP (ref 96–108)
CHOLEST SERPL-MCNC: 160 MG/DL — SIGNIFICANT CHANGE UP
CO2 SERPL-SCNC: 22 MMOL/L — SIGNIFICANT CHANGE UP (ref 22–31)
CREAT SERPL-MCNC: 1.13 MG/DL — SIGNIFICANT CHANGE UP (ref 0.5–1.3)
EGFR: 71 ML/MIN/1.73M2 — SIGNIFICANT CHANGE UP
EGFR: 71 ML/MIN/1.73M2 — SIGNIFICANT CHANGE UP
ESTIMATED AVERAGE GLUCOSE: 117 MG/DL — HIGH (ref 68–114)
GLUCOSE SERPL-MCNC: 98 MG/DL — SIGNIFICANT CHANGE UP (ref 70–99)
HCT VFR BLD CALC: 45.4 % — SIGNIFICANT CHANGE UP (ref 39–50)
HDLC SERPL-MCNC: 37 MG/DL — LOW
HGB BLD-MCNC: 15.1 G/DL — SIGNIFICANT CHANGE UP (ref 13–17)
LIPID PNL WITH DIRECT LDL SERPL: 110 MG/DL — HIGH
MAGNESIUM SERPL-MCNC: 2.1 MG/DL — SIGNIFICANT CHANGE UP (ref 1.6–2.6)
MCHC RBC-ENTMCNC: 32.3 PG — SIGNIFICANT CHANGE UP (ref 27–34)
MCHC RBC-ENTMCNC: 33.3 G/DL — SIGNIFICANT CHANGE UP (ref 32–36)
MCV RBC AUTO: 97 FL — SIGNIFICANT CHANGE UP (ref 80–100)
NON HDL CHOLESTEROL: 123 MG/DL — SIGNIFICANT CHANGE UP
NRBC BLD AUTO-RTO: 0 /100 WBCS — SIGNIFICANT CHANGE UP (ref 0–0)
PHOSPHATE SERPL-MCNC: 2.1 MG/DL — LOW (ref 2.5–4.5)
PLATELET # BLD AUTO: 213 K/UL — SIGNIFICANT CHANGE UP (ref 150–400)
POTASSIUM SERPL-MCNC: 4.2 MMOL/L — SIGNIFICANT CHANGE UP (ref 3.5–5.3)
POTASSIUM SERPL-SCNC: 4.2 MMOL/L — SIGNIFICANT CHANGE UP (ref 3.5–5.3)
RBC # BLD: 4.68 M/UL — SIGNIFICANT CHANGE UP (ref 4.2–5.8)
RBC # FLD: 12.2 % — SIGNIFICANT CHANGE UP (ref 10.3–14.5)
SODIUM SERPL-SCNC: 133 MMOL/L — LOW (ref 135–145)
TRIGL SERPL-MCNC: 66 MG/DL — SIGNIFICANT CHANGE UP
WBC # BLD: 13.05 K/UL — HIGH (ref 3.8–10.5)
WBC # FLD AUTO: 13.05 K/UL — HIGH (ref 3.8–10.5)

## 2025-03-04 RX ORDER — METOPROLOL SUCCINATE 50 MG/1
5 TABLET, EXTENDED RELEASE ORAL ONCE
Refills: 0 | Status: COMPLETED | OUTPATIENT
Start: 2025-03-04 | End: 2025-03-04

## 2025-03-04 RX ORDER — ACETAMINOPHEN 500 MG/5ML
3 LIQUID (ML) ORAL
Qty: 0 | Refills: 0 | DISCHARGE
Start: 2025-03-04

## 2025-03-04 RX ORDER — SOD PHOS DI, MONO/K PHOS MONO 250 MG
2 TABLET ORAL ONCE
Refills: 0 | Status: COMPLETED | OUTPATIENT
Start: 2025-03-04 | End: 2025-03-04

## 2025-03-04 RX ADMIN — Medication 975 MILLIGRAM(S): at 18:48

## 2025-03-04 RX ADMIN — Medication 975 MILLIGRAM(S): at 12:15

## 2025-03-04 RX ADMIN — METOPROLOL SUCCINATE 5 MILLIGRAM(S): 50 TABLET, EXTENDED RELEASE ORAL at 18:49

## 2025-03-04 RX ADMIN — ENOXAPARIN SODIUM 40 MILLIGRAM(S): 100 INJECTION SUBCUTANEOUS at 05:18

## 2025-03-04 RX ADMIN — Medication 50 MICROGRAM(S): at 05:18

## 2025-03-04 RX ADMIN — Medication 975 MILLIGRAM(S): at 11:27

## 2025-03-04 RX ADMIN — Medication 5 MILLIGRAM(S): at 19:40

## 2025-03-04 RX ADMIN — Medication 5 MILLIGRAM(S): at 17:18

## 2025-03-04 RX ADMIN — Medication 975 MILLIGRAM(S): at 19:20

## 2025-03-04 RX ADMIN — Medication 2 PACKET(S): at 11:26

## 2025-03-04 RX ADMIN — Medication 975 MILLIGRAM(S): at 05:19

## 2025-03-04 NOTE — PROGRESS NOTE ADULT - ASSESSMENT
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

## 2025-03-04 NOTE — PROVIDER CONTACT NOTE (OTHER) - SITUATION
Hypertension
Patient A&Ox4, [facility]
Patient BP below ordered parameters

## 2025-03-04 NOTE — PROVIDER CONTACT NOTE (OTHER) - BACKGROUND
[facility] A&Ox4[facility]
Lt TCAR during present admission

## 2025-03-04 NOTE — PROVIDER CONTACT NOTE (OTHER) - ASSESSMENT
Patient A&Ox4, RN bladder scanned patient which showed 267mL urine. Patient denies any pain or discomfort, frequency, or urgency.

## 2025-03-04 NOTE — DISCHARGE NOTE NURSING/CASE MANAGEMENT/SOCIAL WORK - FINANCIAL ASSISTANCE
[facility] provides services at a reduced cost to those who are determined to be eligible through [facility]’s financial assistance program. Information regarding [facility]’s financial assistance program can be found by going to [email or web address] or by calling [phone].

## 2025-03-04 NOTE — PROVIDER CONTACT NOTE (OTHER) - ACTION/TREATMENT ORDERED:
ALFREDO Muniz made aware--> give 5mg IVP Hydralazine and recheck BP in 30mins
No interventions needed at this time
No interventions needed at this time.

## 2025-03-04 NOTE — PROVIDER CONTACT NOTE (OTHER) - REASON
Hypertension
Patient BP below ordered parameters
[facility]Ox[facility]

## 2025-03-04 NOTE — PROVIDER CONTACT NOTE (OTHER) - RECOMMENDATIONS
Norify provider   RN encouraged patient to attempt to use urinal. Patient demonstrated understanding.
Notify provider
Notify provider

## 2025-03-04 NOTE — DISCHARGE NOTE NURSING/CASE MANAGEMENT/SOCIAL WORK - PATIENT PORTAL LINK FT
You can access the FollowMyHealth Patient Portal offered by [facility] by registering at the following website: [email or web address]. By joining [employer]’s FollowMyHealth portal, you will also be able to view your health information using other applications (apps) compatible with our system.

## 2025-03-04 NOTE — PROVIDER CONTACT NOTE (OTHER) - ASSESSMENT
Patient A&Ox4. all other VSS, see flow sheet.   BP's readings as follows:   1930: 104/68   2030: 105/66   2120: 98/61 - RN rechecked BP with Zoll, /63 Patient A&Ox4. all other VSS, see flow sheet. Patient denies any chest pain, difficulty breathing, or SOB.   BP's readings as follows:   1930: 104/68   2030: 105/66   2120: 98/61 - RN rechecked BP with Zoll, /63

## 2025-03-04 NOTE — PROGRESS NOTE ADULT - SUBJECTIVE AND OBJECTIVE BOX
VASCULAR SURGERY DAILY PROGRESS NOTE    24 Hour/Overnight Events: s/p Left TCAR. On POC, pt noted to be retaining >1.2 L urine on bladder scan, and was straight cath x1. Overnight, pt still did not void, and bladder scan showed 267mL urine; pt was straight cath again    SUBJECTIVE: Patient seen and evaluated on AM rounds. Pt reports appropriate surgical incisional Left groin pain, which is adequately controlled on current pain regimen. Tolerating diet. Ambulating well. Denies fevers/chills, chest pain, dyspnea, abdominal pain, nausea/vomiting, loss of sensation/motor function, paresthesias    ------------------------------------------------------------------------------------------------------------  OBJECTIVE:  Vital Signs Last 24 Hrs  T(C): 36.7 (04 Mar 2025 01:05), Max: 36.9 (03 Mar 2025 17:31)  T(F): 98 (04 Mar 2025 01:05), Max: 98.5 (03 Mar 2025 17:31)  HR: 78 (04 Mar 2025 01:05) (66 - 79)  BP: 142/85 (04 Mar 2025 01:05) (98/61 - 171/82)  BP(mean): 92 (03 Mar 2025 16:00) (92 - 121)  RR: 18 (04 Mar 2025 01:05) (16 - 18)  SpO2: 96% (04 Mar 2025 01:05) (94% - 100%)    Parameters below as of 04 Mar 2025 01:05  Patient On (Oxygen Delivery Method): room air      I&O's Detail    03 Mar 2025 07:01  -  04 Mar 2025 07:00  --------------------------------------------------------  IN:    dextrose 5% + sodium chloride 0.45% w/ Additives: 975 mL  Total IN: 975 mL    OUT:    Indwelling Catheter - Urethral (mL): 1375 mL    Voided (mL): 0 mL  Total OUT: 1375 mL    Total NET: -400 mL    LABS:      PHYSICAL EXAM:  Neck: Trachea midline, Lt side incision C/D/I with dermabond, area soft, no swelling or signs of hematoma.   Pulm: No respiratory distress, no subcostal retractions  Abd: Soft, nontender, nondistended  Groin: Lt groin dressing c/d/i, groin soft to touch, no hematoma noted.   Extremities: FROM, warm and well perfused, equal bilateral muscle strength

## 2025-03-05 PROCEDURE — C1876: CPT

## 2025-03-05 PROCEDURE — 82435 ASSAY OF BLOOD CHLORIDE: CPT

## 2025-03-05 PROCEDURE — 36415 COLL VENOUS BLD VENIPUNCTURE: CPT

## 2025-03-05 PROCEDURE — 83605 ASSAY OF LACTIC ACID: CPT

## 2025-03-05 PROCEDURE — 80061 LIPID PANEL: CPT

## 2025-03-05 PROCEDURE — 84132 ASSAY OF SERUM POTASSIUM: CPT

## 2025-03-05 PROCEDURE — 82947 ASSAY GLUCOSE BLOOD QUANT: CPT

## 2025-03-05 PROCEDURE — 83735 ASSAY OF MAGNESIUM: CPT

## 2025-03-05 PROCEDURE — 85014 HEMATOCRIT: CPT

## 2025-03-05 PROCEDURE — C1894: CPT

## 2025-03-05 PROCEDURE — C1769: CPT

## 2025-03-05 PROCEDURE — 82330 ASSAY OF CALCIUM: CPT

## 2025-03-05 PROCEDURE — 84100 ASSAY OF PHOSPHORUS: CPT

## 2025-03-05 PROCEDURE — 85027 COMPLETE CBC AUTOMATED: CPT

## 2025-03-05 PROCEDURE — C9399: CPT

## 2025-03-05 PROCEDURE — 85018 HEMOGLOBIN: CPT

## 2025-03-05 PROCEDURE — C1889: CPT

## 2025-03-05 PROCEDURE — 84295 ASSAY OF SERUM SODIUM: CPT

## 2025-03-05 PROCEDURE — 83036 HEMOGLOBIN GLYCOSYLATED A1C: CPT

## 2025-03-05 PROCEDURE — C1884: CPT

## 2025-03-05 PROCEDURE — 76000 FLUOROSCOPY <1 HR PHYS/QHP: CPT

## 2025-03-05 PROCEDURE — C1725: CPT

## 2025-03-05 PROCEDURE — 82803 BLOOD GASES ANY COMBINATION: CPT

## 2025-03-05 PROCEDURE — 80048 BASIC METABOLIC PNL TOTAL CA: CPT

## 2025-03-26 ENCOUNTER — APPOINTMENT (OUTPATIENT)
Dept: CT IMAGING | Facility: IMAGING CENTER | Age: 70
End: 2025-03-26

## 2025-03-26 ENCOUNTER — APPOINTMENT (OUTPATIENT)
Dept: VASCULAR SURGERY | Facility: CLINIC | Age: 70
End: 2025-03-26

## 2025-03-26 ENCOUNTER — OUTPATIENT (OUTPATIENT)
Dept: OUTPATIENT SERVICES | Facility: HOSPITAL | Age: 70
LOS: 1 days | End: 2025-03-26
Payer: COMMERCIAL

## 2025-03-26 DIAGNOSIS — Z00.8 ENCOUNTER FOR OTHER GENERAL EXAMINATION: ICD-10-CM

## 2025-03-26 DIAGNOSIS — Z92.3 PERSONAL HISTORY OF IRRADIATION: Chronic | ICD-10-CM

## 2025-03-26 DIAGNOSIS — Z90.79 ACQUIRED ABSENCE OF OTHER GENITAL ORGAN(S): Chronic | ICD-10-CM

## 2025-03-26 DIAGNOSIS — C32.9 MALIGNANT NEOPLASM OF LARYNX, UNSPECIFIED: ICD-10-CM

## 2025-03-26 DIAGNOSIS — Z98.890 OTHER SPECIFIED POSTPROCEDURAL STATES: Chronic | ICD-10-CM

## 2025-03-26 DIAGNOSIS — Z92.21 PERSONAL HISTORY OF ANTINEOPLASTIC CHEMOTHERAPY: Chronic | ICD-10-CM

## 2025-03-26 PROCEDURE — 71250 CT THORAX DX C-: CPT

## 2025-03-26 PROCEDURE — 71250 CT THORAX DX C-: CPT | Mod: 26

## 2025-03-28 ENCOUNTER — APPOINTMENT (OUTPATIENT)
Dept: UROLOGY | Facility: CLINIC | Age: 70
End: 2025-03-28
Payer: COMMERCIAL

## 2025-03-28 ENCOUNTER — NON-APPOINTMENT (OUTPATIENT)
Age: 70
End: 2025-03-28

## 2025-03-28 DIAGNOSIS — C61 MALIGNANT NEOPLASM OF PROSTATE: ICD-10-CM

## 2025-03-28 DIAGNOSIS — N13.8 BENIGN PROSTATIC HYPERPLASIA WITH LOWER URINARY TRACT SYMPMS: ICD-10-CM

## 2025-03-28 DIAGNOSIS — R39.9 UNSPECIFIED SYMPTOMS AND SIGNS INVOLVING THE GENITOURINARY SYSTEM: ICD-10-CM

## 2025-03-28 DIAGNOSIS — N40.1 BENIGN PROSTATIC HYPERPLASIA WITH LOWER URINARY TRACT SYMPMS: ICD-10-CM

## 2025-03-28 DIAGNOSIS — R33.9 RETENTION OF URINE, UNSPECIFIED: ICD-10-CM

## 2025-03-28 DIAGNOSIS — R35.0 FREQUENCY OF MICTURITION: ICD-10-CM

## 2025-03-28 DIAGNOSIS — R35.1 NOCTURIA: ICD-10-CM

## 2025-03-28 PROCEDURE — 51798 US URINE CAPACITY MEASURE: CPT

## 2025-03-28 PROCEDURE — 99214 OFFICE O/P EST MOD 30 MIN: CPT

## 2025-03-28 RX ORDER — TAMSULOSIN HYDROCHLORIDE 0.4 MG/1
0.4 CAPSULE ORAL
Qty: 30 | Refills: 3 | Status: ACTIVE | COMMUNITY
Start: 2025-03-28 | End: 1900-01-01

## 2025-03-31 ENCOUNTER — NON-APPOINTMENT (OUTPATIENT)
Age: 70
End: 2025-03-31

## 2025-03-31 ENCOUNTER — APPOINTMENT (OUTPATIENT)
Dept: VASCULAR SURGERY | Facility: CLINIC | Age: 70
End: 2025-03-31
Payer: COMMERCIAL

## 2025-03-31 VITALS
BODY MASS INDEX: 23.57 KG/M2 | WEIGHT: 174 LBS | HEIGHT: 72 IN | HEART RATE: 89 BPM | SYSTOLIC BLOOD PRESSURE: 169 MMHG | OXYGEN SATURATION: 98 % | DIASTOLIC BLOOD PRESSURE: 89 MMHG

## 2025-03-31 DIAGNOSIS — I77.9 DISORDER OF ARTERIES AND ARTERIOLES, UNSPECIFIED: ICD-10-CM

## 2025-03-31 LAB
APPEARANCE: CLEAR
BACTERIA: NEGATIVE /HPF
BILIRUBIN URINE: NEGATIVE
BLOOD URINE: NEGATIVE
CAST: 0 /LPF
COLOR: NORMAL
EPITHELIAL CELLS: 0 /HPF
GLUCOSE QUALITATIVE U: NEGATIVE MG/DL
KETONES URINE: NEGATIVE MG/DL
LEUKOCYTE ESTERASE URINE: NEGATIVE
MICROSCOPIC-UA: NORMAL
NITRITE URINE: NEGATIVE
PH URINE: 7
PROTEIN URINE: NEGATIVE MG/DL
RED BLOOD CELLS URINE: 1 /HPF
SPECIFIC GRAVITY URINE: 1.02
UROBILINOGEN URINE: 0.2 MG/DL
WHITE BLOOD CELLS URINE: 0 /HPF

## 2025-03-31 PROCEDURE — 93880 EXTRACRANIAL BILAT STUDY: CPT

## 2025-03-31 PROCEDURE — 99024 POSTOP FOLLOW-UP VISIT: CPT

## 2025-04-01 LAB — URINE CYTOLOGY: NORMAL

## 2025-04-08 ENCOUNTER — APPOINTMENT (OUTPATIENT)
Dept: OTOLARYNGOLOGY | Facility: CLINIC | Age: 70
End: 2025-04-08

## 2025-04-15 ENCOUNTER — APPOINTMENT (OUTPATIENT)
Dept: OTOLARYNGOLOGY | Facility: CLINIC | Age: 70
End: 2025-04-15
Payer: COMMERCIAL

## 2025-04-15 VITALS
DIASTOLIC BLOOD PRESSURE: 99 MMHG | HEIGHT: 72 IN | SYSTOLIC BLOOD PRESSURE: 179 MMHG | OXYGEN SATURATION: 99 % | WEIGHT: 170 LBS | HEART RATE: 78 BPM | BODY MASS INDEX: 23.03 KG/M2

## 2025-04-15 DIAGNOSIS — R49.0 DYSPHONIA: ICD-10-CM

## 2025-04-15 DIAGNOSIS — C32.9 MALIGNANT NEOPLASM OF LARYNX, UNSPECIFIED: ICD-10-CM

## 2025-04-15 PROCEDURE — 99213 OFFICE O/P EST LOW 20 MIN: CPT | Mod: 25

## 2025-04-15 PROCEDURE — 31575 DIAGNOSTIC LARYNGOSCOPY: CPT

## 2025-05-01 NOTE — H&P PST ADULT - ENDOCRINE
For the next 24 hours patient needs to be with a responsible adult.    For 24 hours DO NOT drive, operate machinery, appliances, drink alcohol, make important decisions or sign legal documents.    Start with a light or bland diet if you are feeling sick to your stomach otherwise advance to regular diet as tolerated.    Follow recommendations on procedure report if provided by your doctor.    Call Dr Polk for problems [phone]    Problems may include but not limited to: large amounts of bleeding, trouble breathing, repeated vomiting, severe unrelieved pain, fever or chills.      
negative

## 2025-05-05 ENCOUNTER — APPOINTMENT (OUTPATIENT)
Dept: UROLOGY | Facility: CLINIC | Age: 70
End: 2025-05-05
Payer: COMMERCIAL

## 2025-05-05 DIAGNOSIS — C61 MALIGNANT NEOPLASM OF PROSTATE: ICD-10-CM

## 2025-05-05 DIAGNOSIS — N52.9 MALE ERECTILE DYSFUNCTION, UNSPECIFIED: ICD-10-CM

## 2025-05-05 LAB
APPEARANCE: CLEAR
BACTERIA: NEGATIVE /HPF
BILIRUBIN URINE: NEGATIVE
BLOOD URINE: NEGATIVE
CAST: 0 /LPF
COLOR: YELLOW
EPITHELIAL CELLS: 0 /HPF
GLUCOSE QUALITATIVE U: NEGATIVE MG/DL
KETONES URINE: NEGATIVE MG/DL
LEUKOCYTE ESTERASE URINE: NEGATIVE
MICROSCOPIC-UA: NORMAL
NITRITE URINE: NEGATIVE
PH URINE: 7.5
PROTEIN URINE: NEGATIVE MG/DL
RED BLOOD CELLS URINE: 0 /HPF
SPECIFIC GRAVITY URINE: 1.01
UROBILINOGEN URINE: 0.2 MG/DL
WHITE BLOOD CELLS URINE: 0 /HPF

## 2025-05-05 PROCEDURE — G2211 COMPLEX E/M VISIT ADD ON: CPT | Mod: NC

## 2025-05-05 PROCEDURE — 51798 US URINE CAPACITY MEASURE: CPT

## 2025-05-05 PROCEDURE — 99214 OFFICE O/P EST MOD 30 MIN: CPT

## 2025-05-07 LAB
BACTERIA UR CULT: NORMAL
URINE CYTOLOGY: NORMAL

## 2025-06-02 ENCOUNTER — APPOINTMENT (OUTPATIENT)
Dept: VASCULAR SURGERY | Facility: CLINIC | Age: 70
End: 2025-06-02
Payer: MEDICARE

## 2025-06-02 VITALS — HEART RATE: 87 BPM | SYSTOLIC BLOOD PRESSURE: 195 MMHG | DIASTOLIC BLOOD PRESSURE: 106 MMHG

## 2025-06-02 VITALS
SYSTOLIC BLOOD PRESSURE: 215 MMHG | HEIGHT: 72 IN | WEIGHT: 170 LBS | TEMPERATURE: 98 F | OXYGEN SATURATION: 97 % | DIASTOLIC BLOOD PRESSURE: 110 MMHG | BODY MASS INDEX: 23.03 KG/M2 | HEART RATE: 87 BPM

## 2025-06-02 DIAGNOSIS — K55.1 CHRONIC VASCULAR DISORDERS OF INTESTINE: ICD-10-CM

## 2025-06-02 PROCEDURE — 93975 VASCULAR STUDY: CPT

## 2025-06-02 PROCEDURE — 99213 OFFICE O/P EST LOW 20 MIN: CPT

## 2025-06-05 ENCOUNTER — APPOINTMENT (OUTPATIENT)
Dept: UROLOGY | Facility: CLINIC | Age: 70
End: 2025-06-05
Payer: COMMERCIAL

## 2025-06-05 DIAGNOSIS — N13.8 BENIGN PROSTATIC HYPERPLASIA WITH LOWER URINARY TRACT SYMPMS: ICD-10-CM

## 2025-06-05 DIAGNOSIS — N40.1 BENIGN PROSTATIC HYPERPLASIA WITH LOWER URINARY TRACT SYMPMS: ICD-10-CM

## 2025-06-05 DIAGNOSIS — C61 MALIGNANT NEOPLASM OF PROSTATE: ICD-10-CM

## 2025-06-05 PROCEDURE — G2211 COMPLEX E/M VISIT ADD ON: CPT | Mod: NC

## 2025-06-05 PROCEDURE — 99214 OFFICE O/P EST MOD 30 MIN: CPT

## 2025-09-15 ENCOUNTER — APPOINTMENT (OUTPATIENT)
Dept: VASCULAR SURGERY | Facility: CLINIC | Age: 70
End: 2025-09-15
Payer: COMMERCIAL

## 2025-09-15 VITALS
TEMPERATURE: 97.7 F | BODY MASS INDEX: 23.03 KG/M2 | OXYGEN SATURATION: 96 % | DIASTOLIC BLOOD PRESSURE: 101 MMHG | HEIGHT: 72 IN | SYSTOLIC BLOOD PRESSURE: 177 MMHG | HEART RATE: 77 BPM | WEIGHT: 170 LBS

## 2025-09-15 DIAGNOSIS — I73.9 PERIPHERAL VASCULAR DISEASE, UNSPECIFIED: ICD-10-CM

## 2025-09-15 DIAGNOSIS — I77.9 DISORDER OF ARTERIES AND ARTERIOLES, UNSPECIFIED: ICD-10-CM

## 2025-09-15 DIAGNOSIS — K55.1 CHRONIC VASCULAR DISORDERS OF INTESTINE: ICD-10-CM

## 2025-09-15 PROCEDURE — 99213 OFFICE O/P EST LOW 20 MIN: CPT

## 2025-09-15 PROCEDURE — 93882 EXTRACRANIAL UNI/LTD STUDY: CPT

## (undated) DEVICE — VISITEC 4X4

## (undated) DEVICE — WARMING BLANKET LOWER ADULT

## (undated) DEVICE — SUT SOFSILK 4-0 18" TIES

## (undated) DEVICE — GLV 8 PROTEXIS (WHITE)

## (undated) DEVICE — SUT BIOSYN 4-0 18" P-12

## (undated) DEVICE — DRSG TELFA 3 X 8

## (undated) DEVICE — LABELS BLANK W PEN

## (undated) DEVICE — Device

## (undated) DEVICE — NDL HYPO REGULAR BEVEL 25G X 1.5" (BLUE)

## (undated) DEVICE — BLADE SCALPEL SAFETYLOCK #11

## (undated) DEVICE — TUBING SUCTION NONCONDUCTIVE 6MM X 12FT

## (undated) DEVICE — VENODYNE/SCD SLEEVE CALF MEDIUM

## (undated) DEVICE — SPECIMEN CONTAINER 100ML

## (undated) DEVICE — DRAPE IOBAN 13" X 13"

## (undated) DEVICE — SUT SOFSILK 0 18" TIES

## (undated) DEVICE — SUT SOFSILK 3-0 30" TIES

## (undated) DEVICE — DRAPE TOWEL BLUE 17" X 24"

## (undated) DEVICE — GLV 6.5 PROTEXIS (WHITE)

## (undated) DEVICE — DRAPE MAYO STAND 30"

## (undated) DEVICE — GOWN LG

## (undated) DEVICE — FOLEY TRAY 16FR 5CC LTX UMETER CLOSED

## (undated) DEVICE — DRAPE 3/4 SHEET 52X76"

## (undated) DEVICE — SUT PROLENE 6-0 4-30" BV-1

## (undated) DEVICE — SOL IRR POUR NS 0.9% 500ML

## (undated) DEVICE — SUT SOFSILK 2-0 18" TIES

## (undated) DEVICE — DRSG OPSITE 2.5 X 2"

## (undated) DEVICE — MARKING PEN W RULER

## (undated) DEVICE — INFLATION DEVICE BASIXCOMPAK

## (undated) DEVICE — PREP CHLORAPREP HI-LITE ORANGE 3ML

## (undated) DEVICE — MEDICATION LABELS W MARKER

## (undated) DEVICE — SOL ANTI FOG

## (undated) DEVICE — DRAPE INSTRUMENT POUCH 6.75" X 11"

## (undated) DEVICE — DRSG TEGADERM 6 X 8"

## (undated) DEVICE — GOWN XL

## (undated) DEVICE — SOL IRR POUR H2O 250ML

## (undated) DEVICE — SOL IRR BAG NS 0.9% 1000ML

## (undated) DEVICE — LAP PAD 18 X 18"

## (undated) DEVICE — DRSG CURITY GAUZE SPONGE 4 X 4" 12-PLY

## (undated) DEVICE — BLADE SCALPEL SAFETYLOCK #15

## (undated) DEVICE — PACK CAROTID ENDARTERECTOMY

## (undated) DEVICE — POSITIONER FOAM EGG CRATE ULNAR 2PCS (PINK)

## (undated) DEVICE — DRSG STERISTRIPS 0.5 X 4"

## (undated) DEVICE — GLV 6 PROTEXIS (WHITE)

## (undated) DEVICE — SUCTION YANKAUER NO CONTROL VENT

## (undated) DEVICE — SUT POLYSORB 2-0 30" GS-21 UNDYED

## (undated) DEVICE — WARMING BLANKET UPPER ADULT